# Patient Record
Sex: FEMALE | Race: WHITE | NOT HISPANIC OR LATINO | Employment: OTHER | URBAN - NONMETROPOLITAN AREA
[De-identification: names, ages, dates, MRNs, and addresses within clinical notes are randomized per-mention and may not be internally consistent; named-entity substitution may affect disease eponyms.]

---

## 2021-09-02 ENCOUNTER — TRANSCRIBE ORDERS (OUTPATIENT)
Dept: ADMINISTRATIVE | Facility: HOSPITAL | Age: 76
End: 2021-09-02

## 2021-09-02 DIAGNOSIS — F17.200 NICOTINE DEPENDENCE, UNCOMPLICATED, UNSPECIFIED NICOTINE PRODUCT TYPE: Primary | ICD-10-CM

## 2021-11-08 DIAGNOSIS — Z01.818 PREOPERATIVE CLEARANCE: ICD-10-CM

## 2021-11-08 DIAGNOSIS — Z12.11 ENCOUNTER FOR SCREENING FOR MALIGNANT NEOPLASM OF COLON: Primary | ICD-10-CM

## 2021-11-29 ENCOUNTER — OFFICE VISIT (OUTPATIENT)
Dept: GASTROENTEROLOGY | Facility: CLINIC | Age: 76
End: 2021-11-29

## 2021-11-29 VITALS — HEART RATE: 80 BPM | WEIGHT: 173 LBS | BODY MASS INDEX: 27.15 KG/M2 | OXYGEN SATURATION: 96 % | HEIGHT: 67 IN

## 2021-11-29 DIAGNOSIS — K59.03 DRUG-INDUCED CONSTIPATION: ICD-10-CM

## 2021-11-29 DIAGNOSIS — Z12.11 ENCOUNTER FOR SCREENING FOR MALIGNANT NEOPLASM OF COLON: Primary | ICD-10-CM

## 2021-11-29 PROCEDURE — 99204 OFFICE O/P NEW MOD 45 MIN: CPT | Performed by: PHYSICIAN ASSISTANT

## 2021-11-29 RX ORDER — GABAPENTIN 600 MG/1
600 TABLET ORAL 3 TIMES DAILY
COMMUNITY

## 2021-11-29 RX ORDER — MELOXICAM 15 MG/1
TABLET ORAL
Status: ON HOLD | COMMUNITY
End: 2023-01-01

## 2021-11-29 RX ORDER — SIMVASTATIN 80 MG
80 TABLET ORAL NIGHTLY
COMMUNITY
End: 2023-01-05 | Stop reason: HOSPADM

## 2021-11-29 RX ORDER — OFLOXACIN 3 MG/ML
SOLUTION/ DROPS OPHTHALMIC
Status: ON HOLD | COMMUNITY
End: 2022-12-31

## 2021-11-29 RX ORDER — LISINOPRIL 10 MG/1
TABLET ORAL
Status: ON HOLD | COMMUNITY
End: 2023-01-01

## 2021-11-29 RX ORDER — CYANOCOBALAMIN 1000 UG/ML
1000 INJECTION, SOLUTION INTRAMUSCULAR; SUBCUTANEOUS
COMMUNITY

## 2021-11-29 RX ORDER — OXYCODONE AND ACETAMINOPHEN 10; 325 MG/1; MG/1
1 TABLET ORAL 4 TIMES DAILY
COMMUNITY

## 2021-11-29 RX ORDER — MAGNESIUM CARB/ALUMINUM HYDROX 105-160MG
296 TABLET,CHEWABLE ORAL TAKE AS DIRECTED
Qty: 592 ML | Refills: 0 | Status: SHIPPED | OUTPATIENT
Start: 2021-11-29 | End: 2021-12-15 | Stop reason: HOSPADM

## 2021-11-29 RX ORDER — TRAZODONE HYDROCHLORIDE 100 MG/1
200 TABLET ORAL
COMMUNITY
Start: 2021-11-06 | End: 2023-01-05 | Stop reason: HOSPADM

## 2021-11-29 RX ORDER — BISACODYL 5 MG/1
20 TABLET, DELAYED RELEASE ORAL ONCE
Qty: 4 TABLET | Refills: 0 | Status: SHIPPED | OUTPATIENT
Start: 2021-11-29 | End: 2021-11-29

## 2021-11-29 RX ORDER — FERROUS SULFATE 325(65) MG
325 TABLET ORAL 2 TIMES DAILY
COMMUNITY

## 2021-11-29 RX ORDER — DICLOFENAC SODIUM 75 MG/1
TABLET, DELAYED RELEASE ORAL
Status: ON HOLD | COMMUNITY
End: 2023-01-01

## 2021-11-29 RX ORDER — METOPROLOL TARTRATE 50 MG/1
TABLET, FILM COATED ORAL 2 TIMES DAILY
Status: ON HOLD | COMMUNITY
End: 2023-01-01

## 2021-11-29 RX ORDER — DOXYCYCLINE HYCLATE 100 MG/1
CAPSULE ORAL
Status: ON HOLD | COMMUNITY
End: 2021-12-15

## 2021-11-29 RX ORDER — NYSTATIN 100000 U/G
CREAM TOPICAL
Status: ON HOLD | COMMUNITY
End: 2022-12-31

## 2021-11-29 RX ORDER — NALOXEGOL OXALATE 25 MG/1
TABLET, FILM COATED ORAL
Status: ON HOLD | COMMUNITY
End: 2023-01-01

## 2021-11-29 NOTE — PROGRESS NOTES
Chief Complaint   Patient presents with   • Constipation       Yamile Angel is a 76 y.o. female who presents to the office today for evaluation of Constipation  .    HPI  Patient presents the clinic today for constipation.  Patient states she has been dealing with this issue for some time now due to her oxycodone 10 mg that she takes every 8 hours.  Patient states that her pain management prescribes her Movantik 25 mg once daily however she does not always take it as prescribed due to it causing some diarrhea and loose bowel movements.  Patient has also taken laxatives and stool softeners in the past which seem to help somewhat.  Patient is also due her 5-year screening colonoscopy.  She last had hers done with Dr. Mcmahon in [place], [place].  She states on that last exam he did find something and informed her she would need them to be done every 5 years rather than 10.  She had had previous colonoscopies completed that were normal.  She is unsure what was found on last colonoscopy-does not know if it was precancerous polyp or not.  She has no rectal bleeding or melena.  Does have a family history of colon cancer-sister was diagnosed with colon cancer in her mid to late 50s.  Review of Systems   Constitutional: Negative.    HENT: Negative for sore throat and trouble swallowing.    Eyes: Negative.    Respiratory: Negative for chest tightness.    Cardiovascular: Negative for chest pain.   Gastrointestinal: Positive for constipation. Negative for abdominal distention, abdominal pain, anal bleeding, blood in stool, diarrhea, nausea, rectal pain and vomiting.   Endocrine: Negative.    Genitourinary: Negative for difficulty urinating.   Musculoskeletal: Positive for back pain. Negative for neck pain.   Skin: Negative.    Allergic/Immunologic: Negative for environmental allergies and food allergies.   Neurological: Positive for dizziness and light-headedness. Negative for headaches.   Hematological: Bruises/bleeds  easily.   Psychiatric/Behavioral: Positive for agitation and sleep disturbance. The patient is nervous/anxious.        ACTIVE PROBLEMS:   Specialty Problems     None          PAST MEDICAL HISTORY:  History reviewed. No pertinent past medical history.    SURGICAL HISTORY:  Past Surgical History:   Procedure Laterality Date   • COLONOSCOPY     • UPPER GASTROINTESTINAL ENDOSCOPY         FAMILY HISTORY:  History reviewed. No pertinent family history.    SOCIAL HISTORY:  Social History     Tobacco Use   • Smoking status: Current Every Day Smoker   • Smokeless tobacco: Never Used   Substance Use Topics   • Alcohol use: Not on file       CURRENT MEDICATION:    Current Outpatient Medications:   •  apixaban (Eliquis) 5 MG tablet tablet, Eliquis 5 mg tablet  take 1 tablet (5 mg) by oral route 2 times per day for 30 days, Disp: , Rfl:   •  cyanocobalamin 1000 MCG/ML injection, cyanocobalamin (vit B-12) 1,000 mcg/mL injection solution  INJECT 1 ML ONCE sub-q ONCE a MONTH, Disp: , Rfl:   •  diclofenac (VOLTAREN) 75 MG EC tablet, diclofenac sodium 75 mg tablet,delayed release  TAKE 1 TABLET BY MOUTH TWICE DAILY AS NEEDED, Disp: , Rfl:   •  doxycycline (VIBRAMYCIN) 100 MG capsule, doxycycline hyclate 100 mg capsule  TAKE ONE CAPSULE BY MOUTH EVERY DAY FOR 10 DAYS, Disp: , Rfl:   •  ferrous sulfate 325 (65 FE) MG tablet, FeroSul 325 mg (65 mg iron) tablet  take 1 tablet (325 mg) by oral route 2 times per day for 30 days, Disp: , Rfl:   •  gabapentin (NEURONTIN) 600 MG tablet, gabapentin 600 mg tablet  TAKE 1 TABLET BY MOUTH THREE TIMES DAILY AS DIRECTED, Disp: , Rfl:   •  lisinopril (PRINIVIL,ZESTRIL) 10 MG tablet, lisinopril 10 mg tablet  take 1 tablet (10 mg) by oral route once daily for 30 days, Disp: , Rfl:   •  meloxicam (MOBIC) 15 MG tablet, meloxicam 15 mg tablet  TAKE 1 TABLET BY MOUTH EVERY DAY, Disp: , Rfl:   •  metoprolol tartrate (LOPRESSOR) 50 MG tablet, metoprolol tartrate 50 mg tablet  take 1 tablet (50 mg) by oral  "route 2 times per day with meals for 30 days, Disp: , Rfl:   •  Naloxegol Oxalate (Movantik) 25 MG tablet, Movantik 25 mg tablet  TAKE 1 TABLET BY MOUTH EVERY DAY, Disp: , Rfl:   •  nystatin (MYCOSTATIN) 218279 UNIT/GM cream, nystatin 100,000 unit/gram topical cream  APPLY TO THE AFFECTED AREA(S) TWICE DAILY for 14 DAYS, Disp: , Rfl:   •  ofloxacin (OCUFLOX) 0.3 % ophthalmic solution, ofloxacin 0.3 % eye drops  USE 1 DROP IN OPERATIVE IN THE AFFECTED EYE FOUR TIMES DAILY for SEVEN DAYS FOR INFECTION, Disp: , Rfl:   •  oxyCODONE-acetaminophen (Endocet)  MG per tablet, Endocet 10 mg-325 mg tablet  TAKE 1 TABLET BY MOUTH THREE TIMES DAILY AS NEEDED, Disp: , Rfl:   •  simvastatin (ZOCOR) 80 MG tablet, simvastatin 80 mg tablet  take 1 tablet (80 mg) by oral route once daily in the evening, Disp: , Rfl:   •  tiotropium (Spiriva HandiHaler) 18 MCG per inhalation capsule, Spiriva with HandiHaler 18 mcg and inhalation capsules, Disp: , Rfl:   •  traZODone (DESYREL) 100 MG tablet, Take 200 mg by mouth every night at bedtime., Disp: , Rfl:   •  bisacodyl (DULCOLAX) 5 MG EC tablet, Take 4 tablets by mouth 1 (One) Time for 1 dose. Take 4 tablets at 4:00 PM the day before procedure, Disp: 4 tablet, Rfl: 0  •  magnesium citrate 1.745 GM/30ML solution solution, Take 296 mL by mouth Take As Directed for 2 doses. Take one full bottle at 4:00 PM and take second bottle at 10:00 PM., Disp: 592 mL, Rfl: 0    ALLERGIES:  Penicillins and Sulfa antibiotics    VISIT VITALS:  Pulse 80   Ht 170.2 cm (67\")   Wt 78.5 kg (173 lb)   SpO2 96%   BMI 27.10 kg/m²   Physical Exam  Constitutional:       General: She is not in acute distress.     Appearance: Normal appearance. She is well-developed.   HENT:      Head: Normocephalic and atraumatic.   Eyes:      Pupils: Pupils are equal, round, and reactive to light.   Cardiovascular:      Rate and Rhythm: Normal rate and regular rhythm.      Heart sounds: Normal heart sounds.   Pulmonary:      " Effort: Pulmonary effort is normal. No respiratory distress.      Breath sounds: Normal breath sounds. No wheezing, rhonchi or rales.   Abdominal:      General: Abdomen is flat. Bowel sounds are normal. There is no distension.      Palpations: Abdomen is soft. There is no mass.      Tenderness: There is no abdominal tenderness. There is no guarding or rebound.      Hernia: No hernia is present.   Musculoskeletal:         General: No swelling. Normal range of motion.      Cervical back: Normal range of motion and neck supple.      Right lower leg: No edema.      Left lower leg: No edema.   Skin:     General: Skin is warm and dry.   Neurological:      Mental Status: She is alert and oriented to person, place, and time.   Psychiatric:         Attention and Perception: Attention normal.         Mood and Affect: Mood normal.         Speech: Speech normal.         Behavior: Behavior normal. Behavior is cooperative.         Thought Content: Thought content normal.         Assessment/Plan   Due to the patient's symptoms of constipation-she was instructed to take her Movantik 25 mg once daily.  She was told if 25 mg is causing some diarrhea when she takes it that she can cut the tablet in half and decrease to 12.5 daily and see how she does.  I will also go ahead and get her scheduled to have a screening colonoscopy performed by Dr. Nichols using a mag citrate Dulcolax colon prep.  The procedure was thoroughly explained to her and she voiced understanding and agreed to the treatment plan.  We will try to obtain past colonoscopy records from Dr. Mcmahon in [place]   Diagnosis Plan   1. Encounter for screening for malignant neoplasm of colon  bisacodyl (DULCOLAX) 5 MG EC tablet    magnesium citrate 1.745 GM/30ML solution solution   2. Drug-induced constipation  bisacodyl (DULCOLAX) 5 MG EC tablet    magnesium citrate 1.745 GM/30ML solution solution       Return After procedure.                   This document has been  electronically signed by Celio Henao PA-C  November 29, 2021 12:42 EST    Part of this note may be an electronic transcription/translation of spoken language to printed text using the Dragon Dictation System.

## 2021-11-30 DIAGNOSIS — Z12.11 ENCOUNTER FOR SCREENING FOR MALIGNANT NEOPLASM OF COLON: ICD-10-CM

## 2021-11-30 DIAGNOSIS — Z01.818 PREOPERATIVE CLEARANCE: ICD-10-CM

## 2021-11-30 DIAGNOSIS — K59.03 DRUG-INDUCED CONSTIPATION: Primary | ICD-10-CM

## 2021-12-02 ENCOUNTER — PATIENT ROUNDING (BHMG ONLY) (OUTPATIENT)
Dept: GASTROENTEROLOGY | Facility: CLINIC | Age: 76
End: 2021-12-02

## 2021-12-06 PROBLEM — Z12.11 ENCOUNTER FOR SCREENING FOR MALIGNANT NEOPLASM OF COLON: Status: ACTIVE | Noted: 2021-12-06

## 2021-12-06 PROBLEM — Z01.818 PREOPERATIVE CLEARANCE: Status: ACTIVE | Noted: 2021-12-06

## 2021-12-13 ENCOUNTER — LAB (OUTPATIENT)
Dept: LAB | Facility: HOSPITAL | Age: 76
End: 2021-12-13

## 2021-12-13 DIAGNOSIS — Z01.818 PREOPERATIVE CLEARANCE: ICD-10-CM

## 2021-12-13 DIAGNOSIS — Z12.11 ENCOUNTER FOR SCREENING FOR MALIGNANT NEOPLASM OF COLON: ICD-10-CM

## 2021-12-13 DIAGNOSIS — K59.03 DRUG-INDUCED CONSTIPATION: ICD-10-CM

## 2021-12-14 ENCOUNTER — LAB (OUTPATIENT)
Dept: LAB | Facility: HOSPITAL | Age: 76
End: 2021-12-14

## 2021-12-14 DIAGNOSIS — Z01.818 PREOPERATIVE CLEARANCE: Primary | ICD-10-CM

## 2021-12-14 LAB
FLUAV RNA RESP QL NAA+PROBE: NOT DETECTED
FLUBV RNA RESP QL NAA+PROBE: NOT DETECTED
SARS-COV-2 RNA RESP QL NAA+PROBE: NOT DETECTED

## 2021-12-14 PROCEDURE — 87636 SARSCOV2 & INF A&B AMP PRB: CPT | Performed by: INTERNAL MEDICINE

## 2021-12-14 PROCEDURE — C9803 HOPD COVID-19 SPEC COLLECT: HCPCS

## 2021-12-15 ENCOUNTER — ANESTHESIA EVENT (OUTPATIENT)
Dept: PERIOP | Facility: HOSPITAL | Age: 76
End: 2021-12-15

## 2021-12-15 ENCOUNTER — ANESTHESIA (OUTPATIENT)
Dept: PERIOP | Facility: HOSPITAL | Age: 76
End: 2021-12-15

## 2021-12-15 ENCOUNTER — HOSPITAL ENCOUNTER (OUTPATIENT)
Facility: HOSPITAL | Age: 76
Setting detail: HOSPITAL OUTPATIENT SURGERY
Discharge: HOME | End: 2021-12-15
Attending: INTERNAL MEDICINE | Admitting: INTERNAL MEDICINE

## 2021-12-15 VITALS
HEIGHT: 67 IN | TEMPERATURE: 98.5 F | OXYGEN SATURATION: 98 % | RESPIRATION RATE: 20 BRPM | WEIGHT: 175 LBS | DIASTOLIC BLOOD PRESSURE: 72 MMHG | BODY MASS INDEX: 27.47 KG/M2 | SYSTOLIC BLOOD PRESSURE: 138 MMHG | HEART RATE: 90 BPM

## 2021-12-15 PROCEDURE — 25010000002 PROPOFOL 10 MG/ML EMULSION: Performed by: NURSE ANESTHETIST, CERTIFIED REGISTERED

## 2021-12-15 PROCEDURE — G0105 COLORECTAL SCRN; HI RISK IND: HCPCS | Performed by: INTERNAL MEDICINE

## 2021-12-15 RX ORDER — PROPOFOL 10 MG/ML
VIAL (ML) INTRAVENOUS AS NEEDED
Status: DISCONTINUED | OUTPATIENT
Start: 2021-12-15 | End: 2021-12-15 | Stop reason: SURG

## 2021-12-15 RX ORDER — ONDANSETRON 2 MG/ML
4 INJECTION INTRAMUSCULAR; INTRAVENOUS AS NEEDED
Status: DISCONTINUED | OUTPATIENT
Start: 2021-12-15 | End: 2021-12-15 | Stop reason: HOSPADM

## 2021-12-15 RX ORDER — MIDAZOLAM HYDROCHLORIDE 1 MG/ML
0.5 INJECTION INTRAMUSCULAR; INTRAVENOUS
Status: DISCONTINUED | OUTPATIENT
Start: 2021-12-15 | End: 2021-12-15 | Stop reason: HOSPADM

## 2021-12-15 RX ORDER — SODIUM CHLORIDE, SODIUM LACTATE, POTASSIUM CHLORIDE, CALCIUM CHLORIDE 600; 310; 30; 20 MG/100ML; MG/100ML; MG/100ML; MG/100ML
100 INJECTION, SOLUTION INTRAVENOUS ONCE AS NEEDED
Status: DISCONTINUED | OUTPATIENT
Start: 2021-12-15 | End: 2021-12-15 | Stop reason: HOSPADM

## 2021-12-15 RX ORDER — SODIUM CHLORIDE 0.9 % (FLUSH) 0.9 %
10 SYRINGE (ML) INJECTION EVERY 12 HOURS SCHEDULED
Status: DISCONTINUED | OUTPATIENT
Start: 2021-12-15 | End: 2021-12-15 | Stop reason: HOSPADM

## 2021-12-15 RX ORDER — DROPERIDOL 2.5 MG/ML
0.62 INJECTION, SOLUTION INTRAMUSCULAR; INTRAVENOUS ONCE AS NEEDED
Status: DISCONTINUED | OUTPATIENT
Start: 2021-12-15 | End: 2021-12-15 | Stop reason: HOSPADM

## 2021-12-15 RX ORDER — SODIUM CHLORIDE 0.9 % (FLUSH) 0.9 %
10 SYRINGE (ML) INJECTION AS NEEDED
Status: DISCONTINUED | OUTPATIENT
Start: 2021-12-15 | End: 2021-12-15 | Stop reason: HOSPADM

## 2021-12-15 RX ORDER — FENTANYL CITRATE 50 UG/ML
50 INJECTION, SOLUTION INTRAMUSCULAR; INTRAVENOUS
Status: DISCONTINUED | OUTPATIENT
Start: 2021-12-15 | End: 2021-12-15 | Stop reason: HOSPADM

## 2021-12-15 RX ORDER — IPRATROPIUM BROMIDE AND ALBUTEROL SULFATE 2.5; .5 MG/3ML; MG/3ML
3 SOLUTION RESPIRATORY (INHALATION) ONCE AS NEEDED
Status: DISCONTINUED | OUTPATIENT
Start: 2021-12-15 | End: 2021-12-15 | Stop reason: HOSPADM

## 2021-12-15 RX ORDER — SODIUM CHLORIDE, SODIUM LACTATE, POTASSIUM CHLORIDE, CALCIUM CHLORIDE 600; 310; 30; 20 MG/100ML; MG/100ML; MG/100ML; MG/100ML
125 INJECTION, SOLUTION INTRAVENOUS ONCE
Status: COMPLETED | OUTPATIENT
Start: 2021-12-15 | End: 2021-12-15

## 2021-12-15 RX ADMIN — PROPOFOL 50 MG: 10 INJECTION, EMULSION INTRAVENOUS at 09:55

## 2021-12-15 RX ADMIN — PROPOFOL 100 MG: 10 INJECTION, EMULSION INTRAVENOUS at 09:40

## 2021-12-15 RX ADMIN — PROPOFOL 50 MG: 10 INJECTION, EMULSION INTRAVENOUS at 09:50

## 2021-12-15 RX ADMIN — SODIUM CHLORIDE, POTASSIUM CHLORIDE, SODIUM LACTATE AND CALCIUM CHLORIDE: 600; 310; 30; 20 INJECTION, SOLUTION INTRAVENOUS at 09:34

## 2021-12-15 RX ADMIN — PROPOFOL 100 MG: 10 INJECTION, EMULSION INTRAVENOUS at 09:45

## 2021-12-15 RX ADMIN — EPHEDRINE SULFATE 10 MG: 50 INJECTION, SOLUTION INTRAVENOUS at 09:53

## 2021-12-15 RX ADMIN — EPHEDRINE SULFATE 10 MG: 50 INJECTION, SOLUTION INTRAVENOUS at 09:57

## 2021-12-15 NOTE — OP NOTE
COLONOSCOPY PROCEDURE NOTE    Yamile Angel  12/15/2021    GASTROENTEROLOGIST:  Rhonda Parada MD      PRE-PROCEDURE DIAGNOSIS:   Preoperative clearance [Z01.818]  Encounter for screening for malignant neoplasm of colon [Z12.11]    POST-PROCEDURE DIAGNOSIS:  1.  Poor to fair colon prep  2.  Internal hemorrhoids    PROCEDURE:  COLONOSCOPY      ANESTHESIA:  Propofol administered by anesthesia.  See anesthesia notes for ASA classification    STAFF:  Circulator: Grace Davenport RN; Venessa Beck RN  Endo Technician: Carmen Verde    Findings:  1.  Inadequate colon prep for evaluating flat or small polyps.  There were no large masses.  2.  Small internal hemorrhoids.    OPERATIVE PROCEDURE:  After proper informed consent was obtained, the patient was taken the operating suite and placed in left lateral decubitus position.  An Olympus video colonoscope 180 series was inserted in the rectum and advanced to the terminal ileum under direct visualization.  Cecum and terminal ileum were identified by visualization of the appendiceal orifice and ileocecal valve.  The colonoscope was then slowly withdrawn from the cecum to the rectum and passed a second time from rectum to cecum.  The colonoscope was retroflexed in the cecum and rectum. Scope was then withdrawn. Patient tolerated the procedure well. There were no immediate complications. Cecal withdrawal time was 10 minutes.    ESTIMATED BLOOD LOSS:  None     COMPLICATIONS:  None    GRAFTS/IMPLANTS:  None    RECOMMENDATIONS:  1.  Repeat colonoscopy in 1 year with a 2-day colon prep and family history colon cancer.    Rhonda Parada MD  12/15/21 10:01 EST

## 2021-12-15 NOTE — ANESTHESIA POSTPROCEDURE EVALUATION
Patient: Yamile Angel    Procedure Summary     Date: 12/15/21 Room / Location:  [facility]  /  [facility]    Anesthesia Start: 0934 Anesthesia Stop: 1000    Procedure: COLONOSCOPY FOR SCREENING (N/A ) Diagnosis:       Preoperative clearance      Encounter for screening for malignant neoplasm of colon      (Preoperative clearance [Z01.818])      (Encounter for screening for malignant neoplasm of colon [Z12.11])    Surgeons: Rhonda Parada MD Provider: Warren Arciniega DO    Anesthesia Type: general ASA Status: 3          Anesthesia Type: general    Vitals  Vitals Value Taken Time   /72 12/15/21 1031   Temp 98.5 °F (36.9 °C) 12/15/21 1001   Pulse 90 12/15/21 1031   Resp 20 12/15/21 1031   SpO2 98 % 12/15/21 1031           Post Anesthesia Care and Evaluation    Patient location during evaluation: PHASE II  Patient participation: complete - patient participated  Level of consciousness: awake and alert  Pain score: 0  Pain management: adequate  Airway patency: patent  Anesthetic complications: No anesthetic complications  PONV Status: controlled  Cardiovascular status: acceptable  Respiratory status: acceptable and room air  Hydration status: acceptable  No anesthesia care post op

## 2021-12-15 NOTE — DISCHARGE INSTRUCTIONS
Colonoscopy, Adult, Care After  This sheet gives you information about how to care for yourself after your procedure. Your doctor may also give you more specific instructions. If you have problems or questions, call your doctor.  What can I expect after the procedure?  After the procedure, it is common to have:  · A small amount of blood in your poop (stool) for 24 hours.  · Some gas.  · Mild cramping or bloating in your belly (abdomen).  Follow these instructions at home:  Eating and drinking    · Drink enough fluid to keep your pee (urine) pale yellow.  · Follow instructions from your doctor about what you cannot eat or drink.  · Return to your normal diet as told by your doctor. Avoid heavy or fried foods that are hard to digest.    Activity  · Rest as told by your doctor.  · Do not sit for a long time without moving. Get up to take short walks every 1-2 hours. This is important. Ask for help if you feel weak or unsteady.  · Return to your normal activities as told by your doctor. Ask your doctor what activities are safe for you.  To help cramping and bloating:    · Try walking around.  · Put heat on your belly as told by your doctor. Use the heat source that your doctor recommends, such as a moist heat pack or a heating pad.  ? Put a towel between your skin and the heat source.  ? Leave the heat on for 20-30 minutes.  ? Remove the heat if your skin turns bright red. This is very important if you are unable to feel pain, heat, or cold. You may have a greater risk of getting burned.    General instructions  · If you were given a medicine to help you relax (sedative) during your procedure, it can affect you for many hours. Do not drive or use machinery until your doctor says that it is safe.  · For the first 24 hours after the procedure:  ? Do not sign important documents.  ? Do not drink alcohol.  ? Do your daily activities more slowly than normal.  ? Eat foods that are soft and easy to digest.  · Take  over-the-counter or prescription medicines only as told by your doctor.  · Keep all follow-up visits as told by your doctor. This is important.  Contact a doctor if:  · You have blood in your poop 2-3 days after the procedure.  Get help right away if:  · You have more than a small amount of blood in your poop.  · You see large clumps of tissue (blood clots) in your poop.  · Your belly is swollen.  · You feel like you may vomit (nauseous).  · You vomit.  · You have a fever.  · You have belly pain that gets worse, and medicine does not help your pain.  Summary  · After the procedure, it is common to have a small amount of blood in your poop. You may also have mild cramping and bloating in your belly.  · If you were given a medicine to help you relax (sedative) during your procedure, it can affect you for many hours. Do not drive or use machinery until your doctor says that it is safe.  · Get help right away if you have a lot of blood in your poop, feel like you may vomit, have a fever, or have more belly pain.  This information is not intended to replace advice given to you by your health care provider. Make sure you discuss any questions you have with your health care provider.  Document Revised: 10/23/2020 Document Reviewed: 07/13/2020  [employer] Patient Education © 2021 [employer]  General Anesthesia, Adult, Care After  This sheet gives you information about how to care for yourself after your procedure. Your health care provider may also give you more specific instructions. If you have problems or questions, contact your health care provider.  What can I expect after the procedure?  After the procedure, the following side effects are common:  · Pain or discomfort at the IV site.  · Nausea.  · Vomiting.  · Sore throat.  · Trouble concentrating.  · Feeling cold or chills.  · Feeling weak or tired.  · Sleepiness and fatigue.  · Soreness and body aches. These side effects can affect parts of the body that were not  involved in surgery.  Follow these instructions at home:  For the time period you were told by your health care provider:    · Rest.  · Do not participate in activities where you could fall or become injured.  · Do not drive or use machinery.  · Do not drink alcohol.  · Do not take sleeping pills or medicines that cause drowsiness.  · Do not make important decisions or sign legal documents.  · Do not take care of children on your own.    Eating and drinking  · Follow any instructions from your health care provider about eating or drinking restrictions.  · When you feel hungry, start by eating small amounts of foods that are soft and easy to digest (bland), such as toast. Gradually return to your regular diet.  · Drink enough fluid to keep your urine pale yellow.  · If you vomit, rehydrate by drinking water, juice, or clear broth.  General instructions  · If you have sleep apnea, surgery and certain medicines can increase your risk for breathing problems. Follow instructions from your health care provider about wearing your sleep device:  ? Anytime you are sleeping, including during daytime naps.  ? While taking prescription pain medicines, sleeping medicines, or medicines that make you drowsy.  · Have a responsible adult stay with you for the time you are told. It is important to have someone help care for you until you are awake and alert.  · Return to your normal activities as told by your health care provider. Ask your health care provider what activities are safe for you.  · Take over-the-counter and prescription medicines only as told by your health care provider.  · If you smoke, do not smoke without supervision.  · Keep all follow-up visits as told by your health care provider. This is important.  Contact a health care provider if:  · You have nausea or vomiting that does not get better with medicine.  · You cannot eat or drink without vomiting.  · You have pain that does not get better with medicine.  · You  are unable to pass urine.  · You develop a skin rash.  · You have a fever.  · You have redness around your IV site that gets worse.  Get help right away if:  · You have difficulty breathing.  · You have chest pain.  · You have blood in your urine or stool, or you vomit blood.  Summary  · After the procedure, it is common to have a sore throat or nausea. It is also common to feel tired.  · Have a responsible adult stay with you for the time you are told. It is important to have someone help care for you until you are awake and alert.  · When you feel hungry, start by eating small amounts of foods that are soft and easy to digest (bland), such as toast. Gradually return to your regular diet.  · Drink enough fluid to keep your urine pale yellow.  · Return to your normal activities as told by your health care provider. Ask your health care provider what activities are safe for you.  This information is not intended to replace advice given to you by your health care provider. Make sure you discuss any questions you have with your health care provider.  Document Revised: 06/02/2021 Document Reviewed: 04/01/2021  [employer] Patient Education © 2021 [employer]

## 2021-12-15 NOTE — ANESTHESIA PREPROCEDURE EVALUATION
Anesthesia Evaluation     Patient summary reviewed and Nursing notes reviewed   no history of anesthetic complications:  NPO Solid Status: > 8 hours  NPO Liquid Status: > 8 hours           Airway   Mallampati: III  TM distance: >3 FB  Neck ROM: full  No difficulty expected  Dental    (+) upper dentures and poor dentition    Pulmonary - normal exam    breath sounds clear to auscultation  (+) a smoker Current, COPD moderate, shortness of breath,   Cardiovascular - normal exam    PT is on anticoagulation therapy  Patient on routine beta blocker and Beta blocker given within 24 hours of surgery  Rhythm: regular  Rate: normal    (+) hypertension, PARKER, hyperlipidemia,       Neuro/Psych- negative ROS  GI/Hepatic/Renal/Endo - negative ROS     Musculoskeletal     (+) back pain, chronic pain,   Abdominal   (+) obese,    Substance History - negative use     OB/GYN negative ob/gyn ROS         Other   arthritis,      ROS/Med Hx Other: D/c eliquis 3 days ago        Phys Exam Other: Poor dent lower jaw line.  Upper denture.               Anesthesia Plan    ASA 3     general   total IV anesthesia  intravenous induction     Anesthetic plan, all risks, benefits, and alternatives have been provided, discussed and informed consent has been obtained with: patient.    Plan discussed with CRNA.

## 2022-02-10 ENCOUNTER — TRANSCRIBE ORDERS (OUTPATIENT)
Dept: ADMINISTRATIVE | Facility: HOSPITAL | Age: 77
End: 2022-02-10

## 2022-02-10 DIAGNOSIS — I20.9 ANGINAL SYNDROME: Primary | ICD-10-CM

## 2022-08-25 ENCOUNTER — HOSPITAL ENCOUNTER (OUTPATIENT)
Dept: GENERAL RADIOLOGY | Facility: HOSPITAL | Age: 77
Discharge: HOME | End: 2022-08-25
Admitting: FAMILY MEDICINE

## 2022-08-25 ENCOUNTER — TRANSCRIBE ORDERS (OUTPATIENT)
Dept: ADMINISTRATIVE | Facility: HOSPITAL | Age: 77
End: 2022-08-25

## 2022-08-25 DIAGNOSIS — M79.671 PAIN IN RIGHT FOOT: ICD-10-CM

## 2022-08-25 DIAGNOSIS — M79.671 PAIN IN RIGHT FOOT: Primary | ICD-10-CM

## 2022-08-25 PROCEDURE — 73630 X-RAY EXAM OF FOOT: CPT

## 2022-08-25 PROCEDURE — 73630 X-RAY EXAM OF FOOT: CPT | Performed by: RADIOLOGY

## 2022-10-17 ENCOUNTER — TRANSCRIBE ORDERS (OUTPATIENT)
Dept: ADMINISTRATIVE | Facility: HOSPITAL | Age: 77
End: 2022-10-17

## 2022-10-17 DIAGNOSIS — F17.200 TOBACCO DEPENDENCE: Primary | ICD-10-CM

## 2022-10-17 DIAGNOSIS — F17.210 HEAVY SMOKER (MORE THAN 20 CIGARETTES PER DAY): ICD-10-CM

## 2022-11-23 ENCOUNTER — HOSPITAL ENCOUNTER (OUTPATIENT)
Dept: CT IMAGING | Facility: HOSPITAL | Age: 77
Discharge: HOME | End: 2022-11-23
Admitting: FAMILY MEDICINE

## 2022-11-23 DIAGNOSIS — F17.210 HEAVY SMOKER (MORE THAN 20 CIGARETTES PER DAY): ICD-10-CM

## 2022-11-23 DIAGNOSIS — F17.200 TOBACCO DEPENDENCE: ICD-10-CM

## 2022-11-23 PROCEDURE — 71271 CT THORAX LUNG CANCER SCR C-: CPT

## 2022-11-23 PROCEDURE — 71271 CT THORAX LUNG CANCER SCR C-: CPT | Performed by: RADIOLOGY

## 2022-12-31 ENCOUNTER — HOSPITAL ENCOUNTER (INPATIENT)
Facility: HOSPITAL | Age: 77
LOS: 5 days | Discharge: HOME | DRG: 388 | End: 2023-01-05
Attending: STUDENT IN AN ORGANIZED HEALTH CARE EDUCATION/TRAINING PROGRAM | Admitting: INTERNAL MEDICINE
Payer: MEDICARE

## 2022-12-31 ENCOUNTER — APPOINTMENT (OUTPATIENT)
Dept: CT IMAGING | Facility: HOSPITAL | Age: 77
DRG: 388 | End: 2022-12-31
Payer: MEDICARE

## 2022-12-31 ENCOUNTER — APPOINTMENT (OUTPATIENT)
Dept: ULTRASOUND IMAGING | Facility: HOSPITAL | Age: 77
DRG: 388 | End: 2022-12-31
Payer: MEDICARE

## 2022-12-31 ENCOUNTER — APPOINTMENT (OUTPATIENT)
Dept: GENERAL RADIOLOGY | Facility: HOSPITAL | Age: 77
DRG: 388 | End: 2022-12-31
Payer: MEDICARE

## 2022-12-31 DIAGNOSIS — N17.9 SEPSIS WITH ACUTE RENAL FAILURE WITHOUT SEPTIC SHOCK, DUE TO UNSPECIFIED ORGANISM, UNSPECIFIED ACUTE RENAL FAILURE TYPE: Primary | ICD-10-CM

## 2022-12-31 DIAGNOSIS — K56.7 ILEUS: ICD-10-CM

## 2022-12-31 DIAGNOSIS — J43.9 PULMONARY EMPHYSEMA, UNSPECIFIED EMPHYSEMA TYPE: ICD-10-CM

## 2022-12-31 DIAGNOSIS — A41.9 SEPSIS WITH ACUTE RENAL FAILURE WITHOUT SEPTIC SHOCK, DUE TO UNSPECIFIED ORGANISM, UNSPECIFIED ACUTE RENAL FAILURE TYPE: Primary | ICD-10-CM

## 2022-12-31 DIAGNOSIS — R65.20 SEPSIS WITH ACUTE RENAL FAILURE WITHOUT SEPTIC SHOCK, DUE TO UNSPECIFIED ORGANISM, UNSPECIFIED ACUTE RENAL FAILURE TYPE: Primary | ICD-10-CM

## 2022-12-31 LAB
A-A DO2: 43.7 MMHG (ref 0–300)
ALBUMIN SERPL-MCNC: 3.6 G/DL (ref 3.5–5.2)
ALBUMIN/GLOB SERPL: 0.8 G/DL
ALP SERPL-CCNC: 116 U/L (ref 39–117)
ALT SERPL W P-5'-P-CCNC: 19 U/L (ref 1–33)
AMYLASE SERPL-CCNC: 53 U/L (ref 28–100)
ANION GAP SERPL CALCULATED.3IONS-SCNC: 19.3 MMOL/L (ref 5–15)
ARTERIAL PATENCY WRIST A: POSITIVE
AST SERPL-CCNC: 38 U/L (ref 1–32)
ATMOSPHERIC PRESS: 725 MMHG
BACTERIA UR QL AUTO: ABNORMAL /HPF
BASE EXCESS BLDA CALC-SCNC: 1.1 MMOL/L (ref 0–2)
BASOPHILS # BLD AUTO: 0.05 10*3/MM3 (ref 0–0.2)
BASOPHILS NFR BLD AUTO: 0.3 % (ref 0–1.5)
BDY SITE: ABNORMAL
BILIRUB SERPL-MCNC: 0.5 MG/DL (ref 0–1.2)
BILIRUB UR QL STRIP: ABNORMAL
BODY TEMPERATURE: 0 C
BUN SERPL-MCNC: 53 MG/DL (ref 8–23)
BUN/CREAT SERPL: 29 (ref 7–25)
CALCIUM SPEC-SCNC: 9.1 MG/DL (ref 8.6–10.5)
CHLORIDE SERPL-SCNC: 89 MMOL/L (ref 98–107)
CHOLEST SERPL-MCNC: 109 MG/DL (ref 0–200)
CLARITY UR: CLEAR
CO2 BLDA-SCNC: 27.2 MMOL/L (ref 22–33)
CO2 SERPL-SCNC: 24.7 MMOL/L (ref 22–29)
COHGB MFR BLD: 1.7 % (ref 0–5)
COLOR UR: ABNORMAL
CREAT SERPL-MCNC: 1.83 MG/DL (ref 0.57–1)
CRP SERPL-MCNC: 18.68 MG/DL (ref 0–0.5)
D DIMER PPP FEU-MCNC: 1.37 MCGFEU/ML (ref 0–0.77)
D-LACTATE SERPL-SCNC: 1.3 MMOL/L (ref 0.5–2)
D-LACTATE SERPL-SCNC: 4.3 MMOL/L (ref 0.5–2)
DEPRECATED RDW RBC AUTO: 49.2 FL (ref 37–54)
EGFRCR SERPLBLD CKD-EPI 2021: 28.2 ML/MIN/1.73
EOSINOPHIL # BLD AUTO: 0.08 10*3/MM3 (ref 0–0.4)
EOSINOPHIL NFR BLD AUTO: 0.4 % (ref 0.3–6.2)
ERYTHROCYTE [DISTWIDTH] IN BLOOD BY AUTOMATED COUNT: 13.2 % (ref 12.3–15.4)
FLUAV RNA RESP QL NAA+PROBE: NOT DETECTED
FLUBV RNA ISLT QL NAA+PROBE: NOT DETECTED
GLOBULIN UR ELPH-MCNC: 4.3 GM/DL
GLUCOSE SERPL-MCNC: 109 MG/DL (ref 65–99)
GLUCOSE UR STRIP-MCNC: NEGATIVE MG/DL
HCO3 BLDA-SCNC: 26 MMOL/L (ref 20–26)
HCT VFR BLD AUTO: 42.6 % (ref 34–46.6)
HCT VFR BLD CALC: 37.7 % (ref 38–51)
HDLC SERPL-MCNC: 37 MG/DL (ref 40–60)
HGB BLD-MCNC: 13.4 G/DL (ref 12–15.9)
HGB BLDA-MCNC: 12.3 G/DL (ref 13.5–17.5)
HGB UR QL STRIP.AUTO: NEGATIVE
HYALINE CASTS UR QL AUTO: ABNORMAL /LPF
IMM GRANULOCYTES # BLD AUTO: 0.16 10*3/MM3 (ref 0–0.05)
IMM GRANULOCYTES NFR BLD AUTO: 0.9 % (ref 0–0.5)
INHALED O2 CONCENTRATION: 21 %
KETONES UR QL STRIP: ABNORMAL
LDLC SERPL CALC-MCNC: 49 MG/DL (ref 0–100)
LDLC/HDLC SERPL: 1.26 {RATIO}
LEUKOCYTE ESTERASE UR QL STRIP.AUTO: ABNORMAL
LIPASE SERPL-CCNC: 70 U/L (ref 13–60)
LYMPHOCYTES # BLD AUTO: 1.76 10*3/MM3 (ref 0.7–3.1)
LYMPHOCYTES NFR BLD AUTO: 9.4 % (ref 19.6–45.3)
Lab: ABNORMAL
Lab: ABNORMAL
MAGNESIUM SERPL-MCNC: 2.4 MG/DL (ref 1.6–2.4)
MCH RBC QN AUTO: 31.8 PG (ref 26.6–33)
MCHC RBC AUTO-ENTMCNC: 31.5 G/DL (ref 31.5–35.7)
MCV RBC AUTO: 100.9 FL (ref 79–97)
METHGB BLD QL: 0.2 % (ref 0–3)
MODALITY: ABNORMAL
MONOCYTES # BLD AUTO: 1.36 10*3/MM3 (ref 0.1–0.9)
MONOCYTES NFR BLD AUTO: 7.3 % (ref 5–12)
NEUTROPHILS NFR BLD AUTO: 15.33 10*3/MM3 (ref 1.7–7)
NEUTROPHILS NFR BLD AUTO: 81.7 % (ref 42.7–76)
NITRITE UR QL STRIP: NEGATIVE
NOTE: ABNORMAL
NOTIFIED BY: ABNORMAL
NOTIFIED WHO: ABNORMAL
NRBC BLD AUTO-RTO: 0.1 /100 WBC (ref 0–0.2)
NT-PROBNP SERPL-MCNC: 2108 PG/ML (ref 0–1800)
OXYHGB MFR BLDV: 83.9 % (ref 94–99)
PCO2 BLDA: 41.5 MM HG (ref 35–45)
PCO2 TEMP ADJ BLD: ABNORMAL MM[HG]
PH BLDA: 7.41 PH UNITS (ref 7.35–7.45)
PH UR STRIP.AUTO: 5.5 [PH] (ref 5–8)
PH, TEMP CORRECTED: ABNORMAL
PLATELET # BLD AUTO: 420 10*3/MM3 (ref 140–450)
PMV BLD AUTO: 9.7 FL (ref 6–12)
PO2 BLDA: 51.7 MM HG (ref 83–108)
PO2 TEMP ADJ BLD: ABNORMAL MM[HG]
POTASSIUM SERPL-SCNC: 4.2 MMOL/L (ref 3.5–5.2)
PROCALCITONIN SERPL-MCNC: 0.6 NG/ML (ref 0–0.25)
PROT SERPL-MCNC: 7.9 G/DL (ref 6–8.5)
PROT UR QL STRIP: ABNORMAL
RBC # BLD AUTO: 4.22 10*6/MM3 (ref 3.77–5.28)
RBC # UR STRIP: ABNORMAL /HPF
REF LAB TEST METHOD: ABNORMAL
SAO2 % BLDCOA: 85.5 % (ref 94–99)
SARS-COV-2 RNA PNL SPEC NAA+PROBE: NOT DETECTED
SODIUM SERPL-SCNC: 133 MMOL/L (ref 136–145)
SP GR UR STRIP: 1.02 (ref 1–1.03)
SQUAMOUS #/AREA URNS HPF: ABNORMAL /HPF
TRIGL SERPL-MCNC: 127 MG/DL (ref 0–150)
TROPONIN T SERPL-MCNC: <0.01 NG/ML (ref 0–0.03)
TROPONIN T SERPL-MCNC: <0.01 NG/ML (ref 0–0.03)
UROBILINOGEN UR QL STRIP: ABNORMAL
VENTILATOR MODE: ABNORMAL
VLDLC SERPL-MCNC: 23 MG/DL (ref 5–40)
WBC # UR STRIP: ABNORMAL /HPF
WBC NRBC COR # BLD: 18.74 10*3/MM3 (ref 3.4–10.8)

## 2022-12-31 PROCEDURE — 93005 ELECTROCARDIOGRAM TRACING: CPT | Performed by: PHYSICIAN ASSISTANT

## 2022-12-31 PROCEDURE — 25010000002 HEPARIN (PORCINE) PER 1000 UNITS: Performed by: INTERNAL MEDICINE

## 2022-12-31 PROCEDURE — 86140 C-REACTIVE PROTEIN: CPT | Performed by: PHYSICIAN ASSISTANT

## 2022-12-31 PROCEDURE — 85025 COMPLETE CBC W/AUTO DIFF WBC: CPT | Performed by: PHYSICIAN ASSISTANT

## 2022-12-31 PROCEDURE — 94761 N-INVAS EAR/PLS OXIMETRY MLT: CPT

## 2022-12-31 PROCEDURE — 25010000002 ONDANSETRON PER 1 MG: Performed by: PHYSICIAN ASSISTANT

## 2022-12-31 PROCEDURE — 81001 URINALYSIS AUTO W/SCOPE: CPT

## 2022-12-31 PROCEDURE — 82375 ASSAY CARBOXYHB QUANT: CPT

## 2022-12-31 PROCEDURE — 82150 ASSAY OF AMYLASE: CPT | Performed by: PHYSICIAN ASSISTANT

## 2022-12-31 PROCEDURE — 82805 BLOOD GASES W/O2 SATURATION: CPT

## 2022-12-31 PROCEDURE — 85379 FIBRIN DEGRADATION QUANT: CPT | Performed by: INTERNAL MEDICINE

## 2022-12-31 PROCEDURE — 84484 ASSAY OF TROPONIN QUANT: CPT | Performed by: PHYSICIAN ASSISTANT

## 2022-12-31 PROCEDURE — 94799 UNLISTED PULMONARY SVC/PX: CPT

## 2022-12-31 PROCEDURE — 80053 COMPREHEN METABOLIC PANEL: CPT | Performed by: PHYSICIAN ASSISTANT

## 2022-12-31 PROCEDURE — 99223 1ST HOSP IP/OBS HIGH 75: CPT | Performed by: INTERNAL MEDICINE

## 2022-12-31 PROCEDURE — 83880 ASSAY OF NATRIURETIC PEPTIDE: CPT | Performed by: PHYSICIAN ASSISTANT

## 2022-12-31 PROCEDURE — 83735 ASSAY OF MAGNESIUM: CPT | Performed by: PHYSICIAN ASSISTANT

## 2022-12-31 PROCEDURE — 76705 ECHO EXAM OF ABDOMEN: CPT

## 2022-12-31 PROCEDURE — 74176 CT ABD & PELVIS W/O CONTRAST: CPT

## 2022-12-31 PROCEDURE — 87636 SARSCOV2 & INF A&B AMP PRB: CPT | Performed by: PHYSICIAN ASSISTANT

## 2022-12-31 PROCEDURE — 83605 ASSAY OF LACTIC ACID: CPT | Performed by: PHYSICIAN ASSISTANT

## 2022-12-31 PROCEDURE — 83050 HGB METHEMOGLOBIN QUAN: CPT

## 2022-12-31 PROCEDURE — 25010000002 CEFEPIME PER 500 MG: Performed by: INTERNAL MEDICINE

## 2022-12-31 PROCEDURE — 71250 CT THORAX DX C-: CPT

## 2022-12-31 PROCEDURE — 84145 PROCALCITONIN (PCT): CPT | Performed by: INTERNAL MEDICINE

## 2022-12-31 PROCEDURE — 99285 EMERGENCY DEPT VISIT HI MDM: CPT

## 2022-12-31 PROCEDURE — 36415 COLL VENOUS BLD VENIPUNCTURE: CPT

## 2022-12-31 PROCEDURE — 25010000002 CEFTRIAXONE PER 250 MG: Performed by: PHYSICIAN ASSISTANT

## 2022-12-31 PROCEDURE — 36600 WITHDRAWAL OF ARTERIAL BLOOD: CPT

## 2022-12-31 PROCEDURE — 71045 X-RAY EXAM CHEST 1 VIEW: CPT

## 2022-12-31 PROCEDURE — 80061 LIPID PANEL: CPT

## 2022-12-31 PROCEDURE — 87040 BLOOD CULTURE FOR BACTERIA: CPT | Performed by: PHYSICIAN ASSISTANT

## 2022-12-31 PROCEDURE — 83690 ASSAY OF LIPASE: CPT | Performed by: PHYSICIAN ASSISTANT

## 2022-12-31 RX ORDER — METRONIDAZOLE 500 MG/100ML
500 INJECTION, SOLUTION INTRAVENOUS EVERY 8 HOURS
Status: DISCONTINUED | OUTPATIENT
Start: 2022-12-31 | End: 2023-01-05 | Stop reason: HOSPADM

## 2022-12-31 RX ORDER — SODIUM CHLORIDE 0.9 % (FLUSH) 0.9 %
10 SYRINGE (ML) INJECTION AS NEEDED
Status: DISCONTINUED | OUTPATIENT
Start: 2022-12-31 | End: 2023-01-05 | Stop reason: HOSPADM

## 2022-12-31 RX ORDER — SODIUM CHLORIDE 9 MG/ML
75 INJECTION, SOLUTION INTRAVENOUS CONTINUOUS
Status: DISCONTINUED | OUTPATIENT
Start: 2022-12-31 | End: 2023-01-02

## 2022-12-31 RX ORDER — SODIUM CHLORIDE 9 MG/ML
40 INJECTION, SOLUTION INTRAVENOUS AS NEEDED
Status: DISCONTINUED | OUTPATIENT
Start: 2022-12-31 | End: 2023-01-05 | Stop reason: HOSPADM

## 2022-12-31 RX ORDER — HEPARIN SODIUM 5000 [USP'U]/ML
5000 INJECTION, SOLUTION INTRAVENOUS; SUBCUTANEOUS EVERY 8 HOURS SCHEDULED
Status: DISCONTINUED | OUTPATIENT
Start: 2022-12-31 | End: 2023-01-05

## 2022-12-31 RX ORDER — NOREPINEPHRINE BIT/0.9 % NACL 8 MG/250ML
.02-.3 INFUSION BOTTLE (ML) INTRAVENOUS
Status: DISCONTINUED | OUTPATIENT
Start: 2022-12-31 | End: 2023-01-02

## 2022-12-31 RX ORDER — ACETAMINOPHEN 500 MG
500 TABLET ORAL EVERY 6 HOURS PRN
Status: DISCONTINUED | OUTPATIENT
Start: 2022-12-31 | End: 2023-01-05 | Stop reason: HOSPADM

## 2022-12-31 RX ORDER — POLYETHYLENE GLYCOL 3350 17 G/17G
17 POWDER, FOR SOLUTION ORAL DAILY
Status: DISCONTINUED | OUTPATIENT
Start: 2022-12-31 | End: 2023-01-05 | Stop reason: HOSPADM

## 2022-12-31 RX ORDER — SODIUM CHLORIDE 0.9 % (FLUSH) 0.9 %
10 SYRINGE (ML) INJECTION EVERY 12 HOURS SCHEDULED
Status: DISCONTINUED | OUTPATIENT
Start: 2022-12-31 | End: 2023-01-05 | Stop reason: HOSPADM

## 2022-12-31 RX ORDER — ONDANSETRON 2 MG/ML
4 INJECTION INTRAMUSCULAR; INTRAVENOUS ONCE
Status: COMPLETED | OUTPATIENT
Start: 2022-12-31 | End: 2022-12-31

## 2022-12-31 RX ADMIN — POLYETHYLENE GLYCOL (3350) 17 G: 17 POWDER, FOR SOLUTION ORAL at 22:27

## 2022-12-31 RX ADMIN — METRONIDAZOLE 500 MG: 500 INJECTION, SOLUTION INTRAVENOUS at 21:39

## 2022-12-31 RX ADMIN — ONDANSETRON 4 MG: 2 INJECTION INTRAMUSCULAR; INTRAVENOUS at 12:56

## 2022-12-31 RX ADMIN — CEFTRIAXONE 2 G: 2 INJECTION, POWDER, FOR SOLUTION INTRAMUSCULAR; INTRAVENOUS at 13:54

## 2022-12-31 RX ADMIN — SODIUM CHLORIDE 500 ML: 9 INJECTION, SOLUTION INTRAVENOUS at 19:58

## 2022-12-31 RX ADMIN — SODIUM CHLORIDE 500 ML: 9 INJECTION, SOLUTION INTRAVENOUS at 22:51

## 2022-12-31 RX ADMIN — SODIUM CHLORIDE 100 ML/HR: 9 INJECTION, SOLUTION INTRAVENOUS at 21:21

## 2022-12-31 RX ADMIN — HEPARIN SODIUM 5000 UNITS: 5000 INJECTION INTRAVENOUS; SUBCUTANEOUS at 22:15

## 2022-12-31 RX ADMIN — CEFEPIME 2 G: 2 INJECTION, POWDER, FOR SOLUTION INTRAVENOUS at 20:04

## 2022-12-31 RX ADMIN — Medication 10 ML: at 20:08

## 2022-12-31 RX ADMIN — SODIUM CHLORIDE 500 ML: 9 INJECTION, SOLUTION INTRAVENOUS at 14:10

## 2022-12-31 RX ADMIN — SODIUM CHLORIDE 500 ML: 9 INJECTION, SOLUTION INTRAVENOUS at 12:56

## 2022-12-31 NOTE — H&P
[facility] Medicine Services  History & Physical    Patient Identification:  Name:  Yamile Angel  Age:  77 y.o.  Sex:  female  :  1945  MRN:  [id number]   Visit Number:  [id number]  Admit Date: 2022   Primary Care Physician:  Cristofer Garcia MD    Subjective     Chief complaint: Nausea, vomiting, generalized abdominal pain    History of presenting illness:      Yamile Angel is a 77 y.o. female with past medical history significant for COPD on room air at baseline, hyperlipidemia, essential hypertension, and arthritis.  Patient presents to [facility] emergency department today for further evaluation of nausea, vomiting, and abdominal pain which has been ongoing for approximately 4 weeks.  Patient states that she has been able to tolerate clear liquids, however solid food usually results in vomiting.  She also reports generalized abdominal pain, worse in the mid epigastric region.  She reports history of hiatal hernia.  Denies any recent surgical procedures.  Reports constipation which has been ongoing for several weeks.  States that last bowel movement was 5 days ago.  States that she has had subjective, low-grade fever over the last couple of days.  She finally felt so weak and fatigued that she decided to present to the ED for further evaluation.  States that due to poor oral intake, she has experienced lightheadedness and dizziness with standing.  She reports weight loss, unsure of amount.  Denies any chest pain.  Does report some shortness of breath and intermittent productive cough with green sputum which has been ongoing for approximately 3 weeks.  She reports occasional wheezing.  Denies use of supplemental O2 at home.  Currently on room air and stable.  Lung sounds CTA bilaterally.  Breathing is even and unlabored.  Patient also reports decreased urine output and frequency.  Denies burning with urination.  She admits to smoking 1 pack of  cigarettes per day.  Denies need for nicotine patch at this time.  Denies alcohol abuse.  States that she currently lives alone and is compliant with home medication regimen.  Ambulates independently without assistive devices.  Denies any recent falls or injuries.  Plan to hold patient n.p.o.  General surgery consult placed for further eval.  Will initiate cefepime and Flagyl on admission.  Obtain echo in light of elevated proBNP.  No peripheral edema appreciated on exam.  Further work-up and supportive care.  Discussed plan with patient; voiced agreement with no further questions or concerns at this time.    Upon arrival to the ED, vital signs were temperature 98.2, pulse 118, respirations 20, blood pressure 80/51 lying, SPO2 saturation 93% on room air.  Troponin T negative.  proBNP 2108.  CMP with glucose 109, sodium 133, chloride 89, anion gap 19.3, creatinine 1.83, BUN 53, EGFR 28.2, AST 38, otherwise unremarkable.  Amylase 53.  C-reactive protein  18.68.  Lactate 4.3.  Lipase 70.  Magnesium 2.4.  CBC with WBCs 18.74, .9, otherwise unremarkable.  Peripheral blood cultures x2 pending.  COVID-19 and flu A/B swab negative.  Chest x-ray unremarkable.  CT of the chest without contrast with no acute findings.  CT abdomen pelvis without contrast revealed entire small bowel is mildly distended with fluid all the way to the terminal ileum suggesting an ileus; colon is not overly distended but there is fluid within the right colon and air in the transverse colon.  Gallbladder ultrasound revealed gallbladder sludge, otherwise normal gallbladder.    Known Emergency Department medications received prior to my evaluation included 2 g IV Rocephin, 4 mg IV Zofran, total of 1 L normal saline bolus. Emergency Department Room location at the time of my evaluation was 411. Discussed with attending physician, Nilton Murphy MD.       ---------------------------------------------------------------------------------------------------------------------   Review of Systems   Constitutional: Positive for activity change, appetite change, fatigue and fever (subjective, low-grade). Negative for chills and diaphoresis.   HENT: Negative for congestion, sinus pressure, sinus pain, sore throat and trouble swallowing.    Respiratory: Positive for cough (x3 weeks, productive) and shortness of breath (occasional). Negative for choking and chest tightness.    Cardiovascular: Negative for chest pain, palpitations and leg swelling.   Gastrointestinal: Positive for abdominal pain (generalized), constipation (last BM 5 days ago), nausea and vomiting. Negative for blood in stool and diarrhea.   Genitourinary: Positive for decreased urine volume and frequency. Negative for dysuria and flank pain.   Musculoskeletal: Positive for back pain (chronic). Negative for gait problem, neck pain and neck stiffness.   Neurological: Positive for dizziness, weakness (generalized) and light-headedness. Negative for tremors, seizures, syncope, facial asymmetry, speech difficulty, numbness and headaches.      ---------------------------------------------------------------------------------------------------------------------   Past Medical History:   Diagnosis Date   • Arthritis    • COPD (chronic obstructive pulmonary disease) (Formerly [facility])    • Elevated cholesterol    • Hyperlipidemia    • Hypertension    • Sepsis with acute renal failure without septic shock, due to unspecified organism, unspecified acute renal failure type ([facility]) 12/31/2022     Past Surgical History:   Procedure Laterality Date   • APPENDECTOMY     • COLONOSCOPY     • COLONOSCOPY N/A 12/15/2021    Procedure: COLONOSCOPY FOR SCREENING;  Surgeon: Rhonda Parada MD;  Location: [facility];  Service: Gastroenterology;  Laterality: N/A;   • EYE SURGERY     • HYSTERECTOMY     • SHOULDER SURGERY     • TUBAL  ABDOMINAL LIGATION     • UPPER GASTROINTESTINAL ENDOSCOPY     • WRIST SURGERY       No family history on file.  Social History     Socioeconomic History   • Marital status:    Tobacco Use   • Smoking status: Every Day     Packs/day: 1.00     Years: 60.00     Pack years: 60.00     Types: Cigarettes   • Smokeless tobacco: Never   Vaping Use   • Vaping Use: Former   Substance and Sexual Activity   • Alcohol use: Never   • Drug use: Never   • Sexual activity: Never     ---------------------------------------------------------------------------------------------------------------------   Allergies:  Penicillins and Sulfa antibiotics  ---------------------------------------------------------------------------------------------------------------------   Home medications:    Medications below are reported home medications pulling from within the system; at this time, these medications have not been reconciled unless otherwise specified and are in the verification process for further verifcation as current home medications.  (Not in a hospital admission)      Hospital Scheduled Meds:          Current listed hospital scheduled medications may not yet reflect those currently placed in orders that are signed and held awaiting patient's arrival to floor.   ---------------------------------------------------------------------------------------------------------------------     Objective     Vital Signs:  Temp:  [98.2 °F (36.8 °C)] 98.2 °F (36.8 °C)  Heart Rate:  [108-118] 110  Resp:  [20] 20  BP: ()/(48-72) 112/72      12/31/22  1125   Weight: 68 kg (150 lb)     Body mass index is 23.49 kg/m².  ---------------------------------------------------------------------------------------------------------------------       Physical Exam  Vitals reviewed.   Constitutional:       General: She is awake. She is not in acute distress.     Appearance: She is ill-appearing. She is not diaphoretic.      Comments: Currently on room  air.   HENT:      Head: Normocephalic and atraumatic.      Mouth/Throat:      Mouth: Mucous membranes are moist.      Pharynx: Oropharynx is clear.   Eyes:      Extraocular Movements: Extraocular movements intact.      Pupils: Pupils are equal, round, and reactive to light.   Cardiovascular:      Rate and Rhythm: Regular rhythm. Tachycardia present.      Pulses:           Dorsalis pedis pulses are 3+ on the right side and 3+ on the left side.      Heart sounds: Normal heart sounds. No murmur heard.    No friction rub.   Pulmonary:      Effort: Pulmonary effort is normal. No accessory muscle usage, respiratory distress or retractions.      Breath sounds: No wheezing, rhonchi or rales.      Comments: No cough observed on exam.   Abdominal:      General: There is no distension.      Palpations: Abdomen is soft.      Tenderness: There is abdominal tenderness (generalized). There is no guarding or rebound.   Musculoskeletal:         General: No swelling.      Cervical back: Neck supple. No rigidity.      Right lower leg: No edema.      Left lower leg: No edema.   Skin:     General: Skin is warm and dry.      Capillary Refill: Capillary refill takes 2 to 3 seconds.      Coloration: Skin is pale.   Neurological:      Mental Status: She is alert and oriented to person, place, and time. Mental status is at baseline.      Sensory: Sensation is intact. No sensory deficit.      Motor: Weakness (generalized) present. No tremor.   Psychiatric:         Attention and Perception: Attention normal.         Mood and Affect: Mood normal.         Speech: Speech normal.         Behavior: Behavior normal. Behavior is cooperative.         Thought Content: Thought content normal.         Cognition and Memory: Cognition normal.       ---------------------------------------------------------------------------------------------------------------------  EKG:  Pending cardiology interpretation. Per my review, appears sinus tachycardia with some  PACs. QTc mildly prolonged at 467 ms. No evidence of acute ischemia.     Telemetry:  Appearing sinus tachycardia 100s on my exam.   I have personally looked at both the EKG and the telemetry strips.  ---------------------------------------------------------------------------------------------------------------------   Results from last 7 days   Lab Units 12/31/22  1314 12/31/22  1241   CRP mg/dL 18.68*  --    LACTATE mmol/L  --  4.3*   WBC 10*3/mm3  --  18.74*   HEMOGLOBIN g/dL  --  13.4   HEMATOCRIT %  --  42.6   MCV fL  --  100.9*   MCHC g/dL  --  31.5   PLATELETS 10*3/mm3  --  420         Results from last 7 days   Lab Units 12/31/22  1314 12/31/22  1242   SODIUM mmol/L 133*  --    POTASSIUM mmol/L 4.2  --    MAGNESIUM mg/dL  --  2.4   CHLORIDE mmol/L 89*  --    CO2 mmol/L 24.7  --    BUN mg/dL 53*  --    CREATININE mg/dL 1.83*  --    CALCIUM mg/dL 9.1  --    GLUCOSE mg/dL 109*  --    ALBUMIN g/dL 3.6  --    BILIRUBIN mg/dL 0.5  --    ALK PHOS U/L 116  --    AST (SGOT) U/L 38*  --    ALT (SGPT) U/L 19  --    Estimated Creatinine Clearance: 27.6 mL/min (A) (by C-G formula based on SCr of 1.83 mg/dL (H)).  No results found for: AMMONIA  Results from last 7 days   Lab Units 12/31/22  1314   TROPONIN T ng/mL <0.010     Results from last 7 days   Lab Units 12/31/22  1314   PROBNP pg/mL 2,108.0*     No results found for: HGBA1C  No results found for: TSH, FREET4  No results found for: PREGTESTUR, PREGSERUM, HCG, HCGQUANT  Pain Management Panel    There is no flowsheet data to display.           ---------------------------------------------------------------------------------------------------------------------  Imaging Results (Last 7 Days)     Procedure Component Value Units Date/Time    CT Abdomen Pelvis Without Contrast [[id number]] Collected: 12/31/22 1423     Updated: 12/31/22 1425    Narrative:      CT Abdomen Pelvis WO    INDICATION:   Nausea and vomiting for 4 weeks with elevated white blood cell  count    TECHNIQUE:   CT of the abdomen and pelvis without IV contrast. Coronal and sagittal reconstructions were obtained.  Radiation dose reduction techniques included automated exposure control or exposure modulation based on body size. Count of known CT and cardiac nuc  med studies performed in previous 12 months: 1.     COMPARISON:   None available.    FINDINGS:  Abdomen: Liver, gallbladder, spleen, pancreas, adrenal glands and kidneys are normal in appearance. Aorta is normal in size. There is no adenopathy. There is distention of all the small bowel and there is fluid mildly distending the right colon. Left  colon and sigmoid colon are mostly collapsed. Transverse colon is filled with air.    Pelvis: Bladder is normal. Uterus is been removed. There are no adnexal masses. Bones are unremarkable.      Impression:      The entire small bowel is mildly distended with fluid all way to the terminal ileum suggesting an ileus.    Colon is not overly distended but there is fluid within the right colon and air in the transverse colon.    Otherwise negative          Signer Name: Anthony London MD   Signed: 12/31/2022 2:23 PM   Workstation Name: [facility]    [facility]    CT Chest Without Contrast Diagnostic [[id number]] Collected: 12/31/22 1420     Updated: 12/31/22 1423    Narrative:      CT Chest WO    INDICATION:   Chest pain today    TECHNIQUE:   CT of the thorax without IV contrast. Coronal and sagittal reconstructions were obtained.  Radiation dose reduction techniques included automated exposure control or exposure modulation based on body size. Count of known CT and cardiac nuc med studies  performed in previous 12 months: 1.     COMPARISON:   11/23/2022    FINDINGS:  Is mild stable apical fibrotic change in the lungs are otherwise clear. Aorta is normal in size. There is no adenopathy. Right thyroid lobe is normal. Left thyroid lobe is small. Please see abdomen pelvic CT scan for findings  below the diaphragm    There are degenerative changes of the thoracic spine      Impression:      No acute findings    Signer Name: Anthony London MD   Signed: 12/31/2022 2:20 PM   Workstation Name: [facility]    [facility]iology [facility]    US Gallbladder [[id number]] Collected: 12/31/22 1355     Updated: 12/31/22 1357    Narrative:      US Abdomen Ltd    INDICATION:   Right upper quadrant abdominal pain    COMPARISON:   None available.    FINDINGS:  Gallbladder is adequately distended. Wall is not thickened. No stones are visible. There is sludge present Visualized portions of liver normal. Portal vein shows flow into the liver. Common bile duct is 4 mm in diameter.      Impression:      Gallbladder sludge is noted otherwise normal gallbladder ultrasound    Signer Name: Anthony London MD   Signed: 12/31/2022 1:55 PM   Workstation Name: [facility]    Radiology [facility]    XR Chest 1 View [[id number]] Collected: 12/31/22 1228     Updated: 12/31/22 1230    Narrative:      CR Chest 1 Vw    INDICATION:   Nausea and vomiting today     COMPARISON:    None available.    FINDINGS:  Portable AP view(s) of the chest.  The heart and mediastinal contours are normal. The lungs are clear. No pneumothorax or pleural effusion.      Impression:      No acute cardiopulmonary findings.    Signer Name: Anthony London MD   Signed: 12/31/2022 12:28 PM   Workstation Name: [facility]    Radiology [facility]          Last echocardiogram: No previous echo on file.    I have personally reviewed the above radiology images and read the final radiology report on 12/31/22  ---------------------------------------------------------------------------------------------------------------------  Assessment / Plan     Active Hospital Problems    Diagnosis  POA   • **Sepsis with acute renal failure without septic shock, due to unspecified organism, unspecified acute renal failure type (HCC) [A41.9, R65.20, N17.9]  Yes        ASSESSMENT/PLAN:  -Acute small bowel ileus, present on admission  -Septic shock criteria met on admission with HR>90, WBCs>12, Lactate>4, and CRP elevation likely 2/2 above  -Elevated anion gap metabolic acidosis, POA  -Mildly elevated AST, POA  -CT of the abdomen/pelvis obtained in ED. Revealed distention of the entire small bowel with fluid all the way to the terminal ileum, suggesting an ileus.  Also noted fluid within the right colon and air in the transverse colon.  -Gallbladder ultrasound with noted gallbladder sludge, otherwise unremarkable.  -Initially hypotensive; improved after fluid resuscitation.  -Received total of 1 L normal saline bolus and ABX in the ED.  -500 mL NS bolus signed & held to be administered upon arrival to PCU.   -Initiate IV Flagyl and cefepime on admission.  -Peripheral blood cultures obtained x2, pending.  -Amylase non elevated, lipase 70.  -Hold n.p.o.  -General surgery consult placed for further evaluation; greatly appreciate their assistance.  -Repeat CBC in the a.m.  -Closely monitor temperature curve.  -Obtain vital signs per hospital policy.  -Continuous cardiac monitoring and pulse oximetry.   -Tylenol as needed for mild pain/fever.  -Encourage incentive spirometer use.    -Acute kidney injury, POA  -Mild hyponatremia, POA  -Mild hypochloremia, POA  -Baseline creatinine unknown with creatinine on admission of 1.83.  -Monitor urine output and renal function closely.  -Sodium 133.  -Received 1 L normal saline bolus in the ED.  -Avoid nephrotoxins as able.  -Repeat chemistry panel in AM.    -Elevated proBNP (2108), POA  -No known Hx of congestive heart failure  -Chest x-ray and CT of the chest without evidence of effusions or cardiomegaly.  -Transthoracic Echo ordered for further evaluation.  -Appearing euvolemic on exam.    -Essential hypertension  -Initially hypotensive in ED; BP improved after fluid resuscitation.   -Review home medications once reconciled by pharmacy;  plan to hold antihypertensives for now until BP remains stable.   -Closely monitor BP per hospital protocol, titrate medications as necessary.    -Hyperlipidemia  -Obtain fasting lipid panel in the a.m.  -Resume home statin once medications reconciled.    -COPD without acute exacerbation  -On room air at baseline.  -Currently on room air/stable.   -Continuous pulse oximetry.  -Titrate supplemental oxygen to maintain SpO2 saturations > 90%.   -Encourage controlled cough/deep breathing exercises.  -DuoNebs 4 times daily for SOA/wheezing.     -Macrocytosis without anemia, POA  -Hgb/Hct 13.4/42.6 on admission.  -MCV elevated at 100.9.  -Obtain vitamin B12 and folate levels.  -Repeat CBC in the a.m.    -Reported chronic back pain  -Arthritis  -Supportive care.  -Up with assistance, fall precautions.  -Review home medications once reconciled.    -Ongoing tobacco abuse   -Strongly encourage cessation.  -Denies need for NRT at this time.      ----------  -DVT prophylaxis: Subcu heparin  -Activity: Bedrest  -Expected length of stay:   INPATIENT status due to the need for care which can only be reasonably provided in an hospital setting such as aggressive/expedited ancillary services and/or consultation services, the necessity for IV medications, close physician monitoring and/or the possible need for procedures.  In such, I feel patient's risk for adverse outcomes and need for care warrant INPATIENT evaluation and predict the patient's care encounter to likely last beyond 2 midnights.  -Disposition plans to return home on discharge once medically stable.     High risk secondary to acute small bowel ileus, meeting septic shock criteria by CMS guidelines on admission.          Shahnaz Reyes, TOMAS   12/31/22  15:52 EST  Pager #[phone]  ---------------------------------------------------------------------------------------------------------------------

## 2022-12-31 NOTE — ED PROVIDER NOTES
Subjective   History of Present Illness  This is a 77 year old female patient who presents to the ER with chief complaint of nausea and vomiting. PMH significant for HTN, HLD, A fibb on eliquis. For the past 4 weeks, the patient has had daily nausea and vomiting. She also has generalized abdominal pain. Denies diarrhea, constipation, urinary symptoms, fever. She denies chest pain, SOB. She has been followed by her PCP for this issue and nothing has seemed to help so far. She feels very weak.         Review of Systems   Constitutional: Negative for fever.   HENT: Negative.    Respiratory: Negative.    Cardiovascular: Negative.  Negative for chest pain.   Gastrointestinal: Positive for abdominal pain, nausea and vomiting. Negative for abdominal distention, anal bleeding, blood in stool, constipation, diarrhea and rectal pain.   Endocrine: Negative.    Genitourinary: Negative.  Negative for dysuria.   Skin: Negative.    Neurological: Positive for weakness. Negative for dizziness, tremors, seizures, syncope, facial asymmetry, speech difficulty, light-headedness, numbness and headaches.   Psychiatric/Behavioral: Negative.    All other systems reviewed and are negative.      Past Medical History:   Diagnosis Date   • Arthritis    • COPD (chronic obstructive pulmonary disease) (HCC)    • Elevated cholesterol    • Hyperlipidemia    • Hypertension        Allergies   Allergen Reactions   • Penicillins Rash   • Sulfa Antibiotics Rash       Past Surgical History:   Procedure Laterality Date   • APPENDECTOMY     • COLONOSCOPY     • COLONOSCOPY N/A 12/15/2021    Procedure: COLONOSCOPY FOR SCREENING;  Surgeon: Rhonda Parada MD;  Location: [facility];  Service: Gastroenterology;  Laterality: N/A;   • EYE SURGERY     • HYSTERECTOMY     • SHOULDER SURGERY     • TUBAL ABDOMINAL LIGATION     • UPPER GASTROINTESTINAL ENDOSCOPY     • WRIST SURGERY         No family history on file.    Social History     Socioeconomic History    • Marital status:    Tobacco Use   • Smoking status: Every Day     Packs/day: 1.00     Years: 60.00     Pack years: 60.00     Types: Cigarettes   • Smokeless tobacco: Never   Vaping Use   • Vaping Use: Former   Substance and Sexual Activity   • Alcohol use: Never   • Drug use: Never   • Sexual activity: Never           Objective   Physical Exam  Vitals and nursing note reviewed.   Constitutional:       General: She is not in acute distress.     Appearance: She is well-developed. She is not diaphoretic.   HENT:      Head: Normocephalic and atraumatic.      Right Ear: External ear normal.      Left Ear: External ear normal.      Nose: Nose normal.   Eyes:      Conjunctiva/sclera: Conjunctivae normal.      Pupils: Pupils are equal, round, and reactive to light.   Neck:      Vascular: No JVD.      Trachea: No tracheal deviation.   Cardiovascular:      Rate and Rhythm: Normal rate and regular rhythm.      Heart sounds: Normal heart sounds. No murmur heard.  Pulmonary:      Effort: Pulmonary effort is normal. No respiratory distress.      Breath sounds: Normal breath sounds. No wheezing.   Abdominal:      General: Bowel sounds are normal.      Palpations: Abdomen is soft.      Tenderness: There is no abdominal tenderness. There is no right CVA tenderness, left CVA tenderness, guarding or rebound.   Musculoskeletal:         General: No deformity. Normal range of motion.      Cervical back: Normal range of motion and neck supple.   Skin:     General: Skin is warm and dry.      Coloration: Skin is not pale.      Findings: No erythema or rash.   Neurological:      Mental Status: She is alert and oriented to person, place, and time.      Cranial Nerves: No cranial nerve deficit.   Psychiatric:         Behavior: Behavior normal.         Thought Content: Thought content normal.         Procedures           ED Course  ED Course as of 12/31/22 1517   Sat Dec 31, 2022   1222 EKG noted sinus tachycardia.  Occasional  premature atrial complex.  115 beats minute.  No acute ST elevation    Electronically signed by Arnel Barrientos DO, 12/31/22, 12:22 PM EST.   [SF]   1306 XR Chest 1 View  IMPRESSION:  No acute cardiopulmonary findings.     Signer Name: Anthony London MD   Signed: 12/31/2022 12:28 PM   Workstation Name: [email or web address]    [facility] [MM]   1413 US Gallbladder  IMPRESSION:  Gallbladder sludge is noted otherwise normal gallbladder ultrasound     Signer Name: Anthony London MD   Signed: 12/31/2022 1:55 PM   Workstation Name: [email or web address]    [facility] [MM]   1432 CT Chest Without Contrast Diagnostic  IMPRESSION:  No acute findings     Signer Name: Anthony London MD   Signed: 12/31/2022 2:20 PM   Workstation Name: [facility]    [facility] [MM]   1432 CT Abdomen Pelvis Without Contrast  IMPRESSION:  The entire small bowel is mildly distended with fluid all way to the terminal ileum suggesting an ileus.     Colon is not overly distended but there is fluid within the right colon and air in the transverse colon.     Otherwise negative              Signer Name: Anthony London MD   Signed: 12/31/2022 2:23 PM   Workstation Name: [facility]    [facility] [MM]   1503 Spoke with Dr. Langston about this patient who says that this patient isn't surgical but she is happy to consult if hospitalist will admit.  [MM]   1515 Spoke with Dr. Fong who has accepted her in admission.  [MM]      ED Course User Index  [MM] Chitra Norris PA  [SF] Arnel Barrientos DO                                           Medical Decision Making  Ileus (HCC): acute illness or injury  Sepsis with acute renal failure without septic shock, due to unspecified organism, unspecified acute renal failure type (HCC): acute illness or injury  Amount and/or Complexity of Data Reviewed  Labs: ordered.  Radiology: ordered. Decision-making details documented in ED Course.  ECG/medicine  tests: ordered.      Risk  Prescription drug management.  Decision regarding hospitalization.          Final diagnoses:   Sepsis with acute renal failure without septic shock, due to unspecified organism, unspecified acute renal failure type (HCC)   Ileus (HCC)       ED Disposition  ED Disposition     ED Disposition   Decision to Admit    Condition   --    Comment   --             No follow-up provider specified.       Medication List      No changes were made to your prescriptions during this visit.          Chitra Norris PA  12/31/22 1512

## 2023-01-01 ENCOUNTER — APPOINTMENT (OUTPATIENT)
Dept: GENERAL RADIOLOGY | Facility: HOSPITAL | Age: 78
DRG: 388 | End: 2023-01-01
Payer: MEDICARE

## 2023-01-01 ENCOUNTER — APPOINTMENT (OUTPATIENT)
Dept: CARDIOLOGY | Facility: HOSPITAL | Age: 78
DRG: 388 | End: 2023-01-01
Payer: MEDICARE

## 2023-01-01 ENCOUNTER — APPOINTMENT (OUTPATIENT)
Dept: NUCLEAR MEDICINE | Facility: HOSPITAL | Age: 78
DRG: 388 | End: 2023-01-01
Payer: MEDICARE

## 2023-01-01 LAB
ALBUMIN SERPL-MCNC: 2.8 G/DL (ref 3.5–5.2)
ALBUMIN/GLOB SERPL: 0.8 G/DL
ALP SERPL-CCNC: 90 U/L (ref 39–117)
ALT SERPL W P-5'-P-CCNC: 16 U/L (ref 1–33)
ANION GAP SERPL CALCULATED.3IONS-SCNC: 14.4 MMOL/L (ref 5–15)
AST SERPL-CCNC: 33 U/L (ref 1–32)
BASOPHILS # BLD AUTO: 0.03 10*3/MM3 (ref 0–0.2)
BASOPHILS NFR BLD AUTO: 0.2 % (ref 0–1.5)
BH CV ECHO LEFT VENTRICLE BASAL CAVITARY GRADIENT: 70 MMHG
BH CV ECHO MEAS - AO MAX PG: 11 MMHG
BH CV ECHO MEAS - AO MEAN PG: 8 MMHG
BH CV ECHO MEAS - AO ROOT DIAM: 3.3 CM
BH CV ECHO MEAS - AO V2 MAX: 166 CM/SEC
BH CV ECHO MEAS - AO V2 VTI: 36.8 CM
BH CV ECHO MEAS - EDV(CUBED): 64 ML
BH CV ECHO MEAS - EDV(MOD-SP4): 45.7 ML
BH CV ECHO MEAS - EF(MOD-SP4): 51.6 %
BH CV ECHO MEAS - ESV(CUBED): 39.3 ML
BH CV ECHO MEAS - ESV(MOD-SP4): 22.1 ML
BH CV ECHO MEAS - FS: 15 %
BH CV ECHO MEAS - IVS/LVPW: 1.4 CM
BH CV ECHO MEAS - IVSD: 1.4 CM
BH CV ECHO MEAS - LA DIMENSION: 2.5 CM
BH CV ECHO MEAS - LAT PEAK E' VEL: 6.2 CM/SEC
BH CV ECHO MEAS - LV DIASTOLIC VOL/BSA (35-75): 25.5 CM2
BH CV ECHO MEAS - LV MASS(C)D: 165.5 GRAMS
BH CV ECHO MEAS - LV SYSTOLIC VOL/BSA (12-30): 12.3 CM2
BH CV ECHO MEAS - LVIDD: 4 CM
BH CV ECHO MEAS - LVIDS: 3.4 CM
BH CV ECHO MEAS - LVOT AREA: 3.1 CM2
BH CV ECHO MEAS - LVOT DIAM: 2 CM
BH CV ECHO MEAS - LVPWD: 1 CM
BH CV ECHO MEAS - MED PEAK E' VEL: 5.6 CM/SEC
BH CV ECHO MEAS - MV A MAX VEL: 135 CM/SEC
BH CV ECHO MEAS - MV E MAX VEL: 61.3 CM/SEC
BH CV ECHO MEAS - MV E/A: 0.45
BH CV ECHO MEAS - SI(MOD-SP4): 13.2 ML/M2
BH CV ECHO MEAS - SV(MOD-SP4): 23.6 ML
BH CV ECHO MEAS - TAPSE (>1.6): 1.89 CM
BH CV ECHO MEASUREMENTS AVERAGE E/E' RATIO: 10.39
BILIRUB SERPL-MCNC: 0.3 MG/DL (ref 0–1.2)
BUN SERPL-MCNC: 51 MG/DL (ref 8–23)
BUN/CREAT SERPL: 42.1 (ref 7–25)
CALCIUM SPEC-SCNC: 8.1 MG/DL (ref 8.6–10.5)
CHLORIDE SERPL-SCNC: 98 MMOL/L (ref 98–107)
CO2 SERPL-SCNC: 23.6 MMOL/L (ref 22–29)
CREAT SERPL-MCNC: 1.21 MG/DL (ref 0.57–1)
D-LACTATE SERPL-SCNC: 0.8 MMOL/L (ref 0.5–2)
DEPRECATED RDW RBC AUTO: 50.1 FL (ref 37–54)
DEPRECATED RDW RBC AUTO: 50.5 FL (ref 37–54)
EGFRCR SERPLBLD CKD-EPI 2021: 46.3 ML/MIN/1.73
EOSINOPHIL # BLD AUTO: 0.02 10*3/MM3 (ref 0–0.4)
EOSINOPHIL NFR BLD AUTO: 0.1 % (ref 0.3–6.2)
ERYTHROCYTE [DISTWIDTH] IN BLOOD BY AUTOMATED COUNT: 13.2 % (ref 12.3–15.4)
ERYTHROCYTE [DISTWIDTH] IN BLOOD BY AUTOMATED COUNT: 13.2 % (ref 12.3–15.4)
FOLATE SERPL-MCNC: 6.09 NG/ML (ref 4.78–24.2)
GLOBULIN UR ELPH-MCNC: 3.5 GM/DL
GLUCOSE SERPL-MCNC: 110 MG/DL (ref 65–99)
HCT VFR BLD AUTO: 31.6 % (ref 34–46.6)
HCT VFR BLD AUTO: 32.5 % (ref 34–46.6)
HGB BLD-MCNC: 10.1 G/DL (ref 12–15.9)
HGB BLD-MCNC: 10.3 G/DL (ref 12–15.9)
IMM GRANULOCYTES # BLD AUTO: 0.13 10*3/MM3 (ref 0–0.05)
IMM GRANULOCYTES NFR BLD AUTO: 0.9 % (ref 0–0.5)
LEFT ATRIUM VOLUME INDEX: 33.5 ML/M2
LYMPHOCYTES # BLD AUTO: 2.45 10*3/MM3 (ref 0.7–3.1)
LYMPHOCYTES NFR BLD AUTO: 16.8 % (ref 19.6–45.3)
MAXIMAL PREDICTED HEART RATE: 143 BPM
MCH RBC QN AUTO: 32.6 PG (ref 26.6–33)
MCH RBC QN AUTO: 32.9 PG (ref 26.6–33)
MCHC RBC AUTO-ENTMCNC: 31.7 G/DL (ref 31.5–35.7)
MCHC RBC AUTO-ENTMCNC: 32 G/DL (ref 31.5–35.7)
MCV RBC AUTO: 102.8 FL (ref 79–97)
MCV RBC AUTO: 102.9 FL (ref 79–97)
MONOCYTES # BLD AUTO: 1.32 10*3/MM3 (ref 0.1–0.9)
MONOCYTES NFR BLD AUTO: 9.1 % (ref 5–12)
NEUTROPHILS NFR BLD AUTO: 10.61 10*3/MM3 (ref 1.7–7)
NEUTROPHILS NFR BLD AUTO: 72.9 % (ref 42.7–76)
NRBC BLD AUTO-RTO: 0 /100 WBC (ref 0–0.2)
PLATELET # BLD AUTO: 269 10*3/MM3 (ref 140–450)
PLATELET # BLD AUTO: 285 10*3/MM3 (ref 140–450)
PMV BLD AUTO: 9.5 FL (ref 6–12)
PMV BLD AUTO: 9.8 FL (ref 6–12)
POTASSIUM SERPL-SCNC: 3.9 MMOL/L (ref 3.5–5.2)
PROT SERPL-MCNC: 6.3 G/DL (ref 6–8.5)
QT INTERVAL: 338 MS
QTC INTERVAL: 467 MS
RBC # BLD AUTO: 3.07 10*6/MM3 (ref 3.77–5.28)
RBC # BLD AUTO: 3.16 10*6/MM3 (ref 3.77–5.28)
SODIUM SERPL-SCNC: 136 MMOL/L (ref 136–145)
STRESS TARGET HR: 122 BPM
TSH SERPL DL<=0.05 MIU/L-ACNC: 0.29 UIU/ML (ref 0.27–4.2)
VIT B12 BLD-MCNC: 1071 PG/ML (ref 211–946)
WBC NRBC COR # BLD: 14.24 10*3/MM3 (ref 3.4–10.8)
WBC NRBC COR # BLD: 14.56 10*3/MM3 (ref 3.4–10.8)

## 2023-01-01 PROCEDURE — 84443 ASSAY THYROID STIM HORMONE: CPT | Performed by: INTERNAL MEDICINE

## 2023-01-01 PROCEDURE — 82746 ASSAY OF FOLIC ACID SERUM: CPT | Performed by: INTERNAL MEDICINE

## 2023-01-01 PROCEDURE — 85027 COMPLETE CBC AUTOMATED: CPT | Performed by: INTERNAL MEDICINE

## 2023-01-01 PROCEDURE — 94640 AIRWAY INHALATION TREATMENT: CPT

## 2023-01-01 PROCEDURE — 71045 X-RAY EXAM CHEST 1 VIEW: CPT

## 2023-01-01 PROCEDURE — 02HV33Z INSERTION OF INFUSION DEVICE INTO SUPERIOR VENA CAVA, PERCUTANEOUS APPROACH: ICD-10-PCS | Performed by: INTERNAL MEDICINE

## 2023-01-01 PROCEDURE — A9567 TECHNETIUM TC-99M AEROSOL: HCPCS | Performed by: INTERNAL MEDICINE

## 2023-01-01 PROCEDURE — 94799 UNLISTED PULMONARY SVC/PX: CPT

## 2023-01-01 PROCEDURE — 80053 COMPREHEN METABOLIC PANEL: CPT | Performed by: INTERNAL MEDICINE

## 2023-01-01 PROCEDURE — 93306 TTE W/DOPPLER COMPLETE: CPT

## 2023-01-01 PROCEDURE — 99221 1ST HOSP IP/OBS SF/LOW 40: CPT | Performed by: SURGERY

## 2023-01-01 PROCEDURE — A9540 TC99M MAA: HCPCS | Performed by: INTERNAL MEDICINE

## 2023-01-01 PROCEDURE — 25010000002 HEPARIN (PORCINE) PER 1000 UNITS: Performed by: INTERNAL MEDICINE

## 2023-01-01 PROCEDURE — 78582 LUNG VENTILAT&PERFUS IMAGING: CPT

## 2023-01-01 PROCEDURE — 94761 N-INVAS EAR/PLS OXIMETRY MLT: CPT

## 2023-01-01 PROCEDURE — 25010000002 CEFEPIME PER 500 MG: Performed by: INTERNAL MEDICINE

## 2023-01-01 PROCEDURE — 83605 ASSAY OF LACTIC ACID: CPT | Performed by: INTERNAL MEDICINE

## 2023-01-01 PROCEDURE — 0 TECHNETIUM ALBUMIN AGGREGATED: Performed by: INTERNAL MEDICINE

## 2023-01-01 PROCEDURE — 82607 VITAMIN B-12: CPT | Performed by: INTERNAL MEDICINE

## 2023-01-01 PROCEDURE — 99233 SBSQ HOSP IP/OBS HIGH 50: CPT | Performed by: INTERNAL MEDICINE

## 2023-01-01 PROCEDURE — 85025 COMPLETE CBC W/AUTO DIFF WBC: CPT | Performed by: INTERNAL MEDICINE

## 2023-01-01 PROCEDURE — 0 TECHNETIUM TC 99M PENTETATE INHALER: Performed by: INTERNAL MEDICINE

## 2023-01-01 PROCEDURE — 36556 INSERT NON-TUNNEL CV CATH: CPT | Performed by: SURGERY

## 2023-01-01 RX ORDER — TRAZODONE HYDROCHLORIDE 50 MG/1
25 TABLET ORAL NIGHTLY
Status: DISCONTINUED | OUTPATIENT
Start: 2023-01-02 | End: 2023-01-05 | Stop reason: HOSPADM

## 2023-01-01 RX ORDER — METOPROLOL TARTRATE 50 MG/1
50 TABLET, FILM COATED ORAL 2 TIMES DAILY
COMMUNITY
End: 2023-01-05 | Stop reason: HOSPADM

## 2023-01-01 RX ORDER — ONDANSETRON HYDROCHLORIDE 8 MG/1
8 TABLET, FILM COATED ORAL 2 TIMES DAILY PRN
COMMUNITY

## 2023-01-01 RX ORDER — GABAPENTIN 300 MG/1
300 CAPSULE ORAL EVERY 8 HOURS SCHEDULED
Status: DISCONTINUED | OUTPATIENT
Start: 2023-01-01 | End: 2023-01-05 | Stop reason: HOSPADM

## 2023-01-01 RX ORDER — METOPROLOL TARTRATE 50 MG/1
50 TABLET, FILM COATED ORAL 2 TIMES DAILY
Status: CANCELLED | OUTPATIENT
Start: 2023-01-01

## 2023-01-01 RX ORDER — ONDANSETRON 4 MG/1
8 TABLET, FILM COATED ORAL 2 TIMES DAILY PRN
Status: DISCONTINUED | OUTPATIENT
Start: 2023-01-01 | End: 2023-01-05 | Stop reason: HOSPADM

## 2023-01-01 RX ORDER — SODIUM CHLORIDE 0.9 % (FLUSH) 0.9 %
10 SYRINGE (ML) INJECTION EVERY 12 HOURS SCHEDULED
Status: DISCONTINUED | OUTPATIENT
Start: 2023-01-01 | End: 2023-01-05 | Stop reason: HOSPADM

## 2023-01-01 RX ORDER — TRAZODONE HYDROCHLORIDE 50 MG/1
200 TABLET ORAL NIGHTLY
Status: CANCELLED | OUTPATIENT
Start: 2023-01-01

## 2023-01-01 RX ORDER — LISINOPRIL AND HYDROCHLOROTHIAZIDE 25; 20 MG/1; MG/1
1 TABLET ORAL DAILY
COMMUNITY
End: 2023-01-05 | Stop reason: HOSPADM

## 2023-01-01 RX ORDER — OXYCODONE AND ACETAMINOPHEN 10; 325 MG/1; MG/1
1 TABLET ORAL 4 TIMES DAILY
Status: DISCONTINUED | OUTPATIENT
Start: 2023-01-01 | End: 2023-01-05 | Stop reason: HOSPADM

## 2023-01-01 RX ORDER — ATORVASTATIN CALCIUM 40 MG/1
40 TABLET, FILM COATED ORAL NIGHTLY
Status: DISCONTINUED | OUTPATIENT
Start: 2023-01-01 | End: 2023-01-05 | Stop reason: HOSPADM

## 2023-01-01 RX ORDER — HYDROCHLOROTHIAZIDE 25 MG/1
25 TABLET ORAL
Status: CANCELLED | OUTPATIENT
Start: 2023-01-01

## 2023-01-01 RX ORDER — SODIUM CHLORIDE 0.9 % (FLUSH) 0.9 %
10 SYRINGE (ML) INJECTION AS NEEDED
Status: DISCONTINUED | OUTPATIENT
Start: 2023-01-01 | End: 2023-01-05 | Stop reason: HOSPADM

## 2023-01-01 RX ORDER — SODIUM CHLORIDE 0.9 % (FLUSH) 0.9 %
20 SYRINGE (ML) INJECTION AS NEEDED
Status: DISCONTINUED | OUTPATIENT
Start: 2023-01-01 | End: 2023-01-05 | Stop reason: HOSPADM

## 2023-01-01 RX ORDER — LISINOPRIL 10 MG/1
20 TABLET ORAL
Status: CANCELLED | OUTPATIENT
Start: 2023-01-01

## 2023-01-01 RX ORDER — FERROUS SULFATE 325(65) MG
325 TABLET ORAL 2 TIMES DAILY
Status: DISCONTINUED | OUTPATIENT
Start: 2023-01-01 | End: 2023-01-05 | Stop reason: HOSPADM

## 2023-01-01 RX ADMIN — KIT FOR THE PREPARATION OF TECHNETIUM TC 99M ALBUMIN AGGREGATED 1 DOSE: 2.5 INJECTION, POWDER, FOR SOLUTION INTRAVENOUS at 15:05

## 2023-01-01 RX ADMIN — GABAPENTIN 300 MG: 300 CAPSULE ORAL at 16:11

## 2023-01-01 RX ADMIN — Medication 10 ML: at 20:55

## 2023-01-01 RX ADMIN — CEFEPIME 2 G: 2 INJECTION, POWDER, FOR SOLUTION INTRAVENOUS at 08:25

## 2023-01-01 RX ADMIN — IPRATROPIUM BROMIDE 0.5 MG: 0.5 SOLUTION RESPIRATORY (INHALATION) at 13:15

## 2023-01-01 RX ADMIN — HEPARIN SODIUM 5000 UNITS: 5000 INJECTION INTRAVENOUS; SUBCUTANEOUS at 20:49

## 2023-01-01 RX ADMIN — METRONIDAZOLE 500 MG: 500 INJECTION, SOLUTION INTRAVENOUS at 20:48

## 2023-01-01 RX ADMIN — ATORVASTATIN CALCIUM 40 MG: 40 TABLET, FILM COATED ORAL at 20:48

## 2023-01-01 RX ADMIN — METRONIDAZOLE 500 MG: 500 INJECTION, SOLUTION INTRAVENOUS at 15:34

## 2023-01-01 RX ADMIN — IPRATROPIUM BROMIDE 0.5 MG: 0.5 SOLUTION RESPIRATORY (INHALATION) at 18:41

## 2023-01-01 RX ADMIN — SODIUM CHLORIDE 100 ML/HR: 9 INJECTION, SOLUTION INTRAVENOUS at 03:32

## 2023-01-01 RX ADMIN — GABAPENTIN 300 MG: 300 CAPSULE ORAL at 20:49

## 2023-01-01 RX ADMIN — HEPARIN SODIUM 5000 UNITS: 5000 INJECTION INTRAVENOUS; SUBCUTANEOUS at 05:22

## 2023-01-01 RX ADMIN — OXYCODONE HYDROCHLORIDE AND ACETAMINOPHEN 1 TABLET: 10; 325 TABLET ORAL at 16:11

## 2023-01-01 RX ADMIN — KIT FOR THE PREPARATION OF TECHNETIUM TC 99M PENTETATE 1 DOSE: 20 INJECTION, POWDER, LYOPHILIZED, FOR SOLUTION INTRAVENOUS; RESPIRATORY (INHALATION) at 14:51

## 2023-01-01 RX ADMIN — FERROUS SULFATE TAB 325 MG (65 MG ELEMENTAL FE) 325 MG: 325 (65 FE) TAB at 20:49

## 2023-01-01 RX ADMIN — HEPARIN SODIUM 5000 UNITS: 5000 INJECTION INTRAVENOUS; SUBCUTANEOUS at 15:34

## 2023-01-01 RX ADMIN — IPRATROPIUM BROMIDE 0.5 MG: 0.5 SOLUTION RESPIRATORY (INHALATION) at 00:40

## 2023-01-01 RX ADMIN — IPRATROPIUM BROMIDE 0.5 MG: 0.5 SOLUTION RESPIRATORY (INHALATION) at 06:22

## 2023-01-01 RX ADMIN — Medication 10 ML: at 08:26

## 2023-01-01 RX ADMIN — NOREPINEPHRINE BITARTRATE 0.02 MCG/KG/MIN: 1 INJECTION, SOLUTION, CONCENTRATE INTRAVENOUS at 00:42

## 2023-01-01 RX ADMIN — METRONIDAZOLE 500 MG: 500 INJECTION, SOLUTION INTRAVENOUS at 05:22

## 2023-01-01 RX ADMIN — OXYCODONE HYDROCHLORIDE AND ACETAMINOPHEN 1 TABLET: 10; 325 TABLET ORAL at 20:49

## 2023-01-01 NOTE — OP NOTE
Central line insertion subclavian    Surgeon:  Yamile Langston M.D., LOKESH Angel      Pre op Diagnosis: hypotension requiring pressors    Post op Diagnosis:  same    Anesthesia: 1% lidocaine    Procedure:  After obtaining informed consent patient was placed in the Trendelenburg position.  With use of the Seldinger technique the left subclavian vein was cannulated.  Guidewire was passed without resistance.  The tract was dilated and the triple-lumen catheter was passed to 20 cm and sutured in place. Good blood return was obtained from all 3 ports.  Dressing was placed.    CXR result: pending    Blood administered:  none      Yamile Langston MD     Date: 1/1/2023  Time: 14:19 EST

## 2023-01-01 NOTE — PROGRESS NOTES
[facility] HOSPITALISTS CROSS COVER NOTE    Patient Identification:  Name:  Yamile Angel  Age:  77 y.o.  Sex:  female  :  1945  MRN:  [id number]  Visit number:  [id number]  Primary Care Provider:  Cristofer Garcia MD    Length of stay in inpatient status:  0    Brief Update     Called about low blood pressures; the patient was about to receive the 500 mL bolus that Dr. Fong; I started the patient on NS at 100 mL/hour.  However, the blood pressures improved some but then dropped again. I gave another 500 mL bolus; I examined the patient prior to this 4th bolus and did not hear any crackles.  She was alert and oriented otherwise.  Thus, when the blood pressures did not improve consistently, I ordered Levophed; at the lowest dose, the blood pressures improved.  I checked on the patient again and she still is doing well with no crackles and no edema.  Will continue to monitor the patient closely.    Kamryn Evans MD  [facility]  22  22:35 EST

## 2023-01-01 NOTE — PLAN OF CARE
Goal Outcome Evaluation:  Plan of Care Reviewed With: patient        Progress: no change  Outcome Evaluation: Pt A/Ox4. VSS, pt remains on levo gtt. NS infusing at 100. RA, tolerating well. No complaints of chest pain or shortness of breath. Pt has had complaints of back pain, MD notified. Call light in reach, bed locked and lowest level, bed alarm active. Will continue to monitor.

## 2023-01-01 NOTE — PROGRESS NOTES
[facility] HOSPITALIST PROGRESS NOTE     Patient Identification:  Name:  Yamile Angel  Age:  77 y.o.  Sex:  female  :  1945  MRN:  [id number]  Visit Number:  [id number]  ROOM: [address]     Primary Care Provider:  Cristofer Garcia MD    Length of stay in inpatient status:  1    Subjective     Chief Compliant:    Chief Complaint   Patient presents with   • Nausea   • Vomiting       History of Presenting Illness:    Patient reports still feeling unwell. Has been NPO but requesting to drink her pepsi. She continues to have generalized abdominal aches. 1 stool recorded. Family supportive bedside.     ROS:  Otherwise 10 point ROS negative other than documented above in HPI.     Objective     Current Hospital Meds:cefepime, 2 g, Intravenous, Q24H  heparin (porcine), 5,000 Units, Subcutaneous, Q8H  ipratropium, 0.5 mg, Nebulization, 4x Daily - RT  metroNIDAZOLE, 500 mg, Intravenous, Q8H  polyethylene glycol, 17 g, Oral, Daily  sodium chloride, 10 mL, Intravenous, Q12H    norepinephrine, 0.02-0.3 mcg/kg/min, Last Rate: 0.04 mcg/kg/min (23 0700)  sodium chloride, 100 mL/hr, Last Rate: 100 mL/hr (23 0332)        Current Antimicrobial Therapy:  Anti-Infectives (From admission, onward)    Ordered     Dose/Rate Route Frequency Start Stop    228  cefepime 2 gm IVPB in 100 ml NS (VTB)        Ordering Provider: Nilton Fong MD    2 g  over 4 Hours Intravenous Every 24 Hours 23 0800 23 0759    22 1838  metroNIDAZOLE (FLAGYL) IVPB 500 mg        Ordering Provider: Nilton Fong MD    500 mg Intravenous Every 8 Hours 22 2200 23 2159    22 1838  cefepime 2 gm IVPB in 100 ml NS (VTB)        Ordering Provider: Nilton Fong MD    2 g  over 30 Minutes Intravenous Once 22 2034    22 1328  cefTRIAXone (ROCEPHIN) 2 g in sodium chloride 0.9 % 100 mL IVPB-VTB        Ordering Provider: Chitra Norris PA    2 g  200 mL/hr  over 30 Minutes Intravenous Once 12/31/22 1330 12/31/22 1424        Current Diuretic Therapy:  Diuretics (From admission, onward)    None        ----------------------------------------------------------------------------------------------------------------------  Vital Signs:  Temp:  [98.2 °F (36.8 °C)-98.8 °F (37.1 °C)] 98.2 °F (36.8 °C)  Heart Rate:  [] 93  Resp:  [17-26] 17  BP: ()/(42-80) 114/62  SpO2:  [91 %-100 %] 91 %  on  Flow (L/min):  [2] 2;   Device (Oxygen Therapy): room air  Body mass index is 23.79 kg/m².    Wt Readings from Last 3 Encounters:   01/01/23 68.9 kg (151 lb 14.4 oz)   12/15/21 79.4 kg (175 lb)   11/29/21 78.5 kg (173 lb)     Intake & Output (last 3 days)       12/29 0701 12/30 0700 12/30 0701 12/31 0700 12/31 0701 01/01 0700 01/01 0701 01/02 0700    I.V. (mL/kg)   1747.5 (25.4)     IV Piggyback   2400     Total Intake(mL/kg)   4147.5 (60.2)     Urine (mL/kg/hr)   800 350 (0.8)    Stool   0 0    Total Output   800 350    Net   +3347.5 -350            Stool Unmeasured Occurrence   1 x 1 x        NPO Diet NPO Type: Strict NPO  ----------------------------------------------------------------------------------------------------------------------  Physical exam:  Constitutional:  Well-developed and well-nourished.  No respiratory distress.      HENT:  Head:  Normocephalic and atraumatic.  Mouth:  Moist mucous membranes.    Eyes:  Conjunctivae and EOM are normal. No scleral icterus.    Neck:  Neck supple.  No JVD present.    Cardiovascular:  Normal rate, regular rhythm and normal heart sounds with no murmur.  Pulmonary/Chest:  No respiratory distress, no wheezes, no crackles, with normal breath sounds and good air movement.  Abdominal:  Soft.  Bowel sounds are normal.  No distension and no tenderness.   Musculoskeletal:  No edema, no tenderness, and no deformity.  No red or swollen joints anywhere.    Neurological:  Alert and oriented to person, place, and time.  No cranial nerve  deficit.  No tongue deviation.  No facial droop.  No slurred speech.   Skin:  Skin is warm and dry. No rash noted. No pallor.   Peripheral vascular:  Pulses in all 4 extremities with no clubbing, no cyanosis, no edema.  ----------------------------------------------------------------------------------------------------------------------  Tele:    ----------------------------------------------------------------------------------------------------------------------  Results from last 7 days   Lab Units 01/01/23  0659 01/01/23  0049 12/31/22  1542 12/31/22  1314 12/31/22  1241   CRP mg/dL  --   --   --  18.68*  --    LACTATE mmol/L 0.8  --  1.3  --  4.3*   WBC 10*3/mm3 14.24* 14.56*  --   --  18.74*   HEMOGLOBIN g/dL 10.1* 10.3*  --   --  13.4   HEMATOCRIT % 31.6* 32.5*  --   --  42.6   MCV fL 102.9* 102.8*  --   --  100.9*   MCHC g/dL 32.0 31.7  --   --  31.5   PLATELETS 10*3/mm3 285 269  --   --  420     Results from last 7 days   Lab Units 12/31/22  1654   PH, ARTERIAL pH units 7.405   PO2 ART mm Hg 51.7*   PCO2, ARTERIAL mm Hg 41.5   HCO3 ART mmol/L 26.0     Results from last 7 days   Lab Units 01/01/23  0049 12/31/22  1314 12/31/22  1242   SODIUM mmol/L 136 133*  --    POTASSIUM mmol/L 3.9 4.2  --    MAGNESIUM mg/dL  --   --  2.4   CHLORIDE mmol/L 98 89*  --    CO2 mmol/L 23.6 24.7  --    BUN mg/dL 51* 53*  --    CREATININE mg/dL 1.21* 1.83*  --    CALCIUM mg/dL 8.1* 9.1  --    GLUCOSE mg/dL 110* 109*  --    ALBUMIN g/dL 2.8* 3.6  --    BILIRUBIN mg/dL 0.3 0.5  --    ALK PHOS U/L 90 116  --    AST (SGOT) U/L 33* 38*  --    ALT (SGPT) U/L 16 19  --    Estimated Creatinine Clearance: 42.4 mL/min (A) (by C-G formula based on SCr of 1.21 mg/dL (H)).  No results found for: AMMONIA  Results from last 7 days   Lab Units 12/31/22  1542 12/31/22  1314   TROPONIN T ng/mL <0.010 <0.010     Results from last 7 days   Lab Units 12/31/22  1314   PROBNP pg/mL 2,108.0*     Results from last 7 days   Lab Units 12/31/22  1542    CHOLESTEROL mg/dL 109   TRIGLYCERIDES mg/dL 127   HDL CHOL mg/dL 37*   LDL CHOL mg/dL 49     No results found for: HGBA1C, POCGLU  Lab Results   Component Value Date    TSH 0.286 01/01/2023     No results found for: PREGTESTUR, PREGSERUM, HCG, HCGQUANT  Pain Management Panel    There is no flowsheet data to display.       Brief Urine Lab Results  (Last result in the past 365 days)      Color   Clarity   Blood   Leuk Est   Nitrite   Protein   CREAT   Urine HCG        12/31/22 2250 Dark Yellow   Clear   Negative   Small (1+)   Negative   Trace               Blood Culture   Date Value Ref Range Status   12/31/2022 No growth at 24 hours  Preliminary   12/31/2022 No growth at 24 hours  Preliminary     No results found for: URINECX  No results found for: WOUNDCX  No results found for: STOOLCX  No results found for: RESPCX  No results found for: AFBCX  Results from last 7 days   Lab Units 01/01/23  0659 12/31/22  1851 12/31/22  1542 12/31/22  1314 12/31/22  1241   PROCALCITONIN ng/mL  --  0.60*  --   --   --    LACTATE mmol/L 0.8  --  1.3  --  4.3*   CRP mg/dL  --   --   --  18.68*  --        I have personally looked at the labs and they are summarized above.  ----------------------------------------------------------------------------------------------------------------------  Detailed radiology reports for the last 24 hours:    Imaging Results (Last 24 Hours)     Procedure Component Value Units Date/Time    CT Abdomen Pelvis Without Contrast [[id number]] Collected: 12/31/22 1423     Updated: 12/31/22 1425    Narrative:      CT Abdomen Pelvis WO    INDICATION:   Nausea and vomiting for 4 weeks with elevated white blood cell count    TECHNIQUE:   CT of the abdomen and pelvis without IV contrast. Coronal and sagittal reconstructions were obtained.  Radiation dose reduction techniques included automated exposure control or exposure modulation based on body size. Count of known CT and cardiac nuc  med studies performed in  previous 12 months: 1.     COMPARISON:   None available.    FINDINGS:  Abdomen: Liver, gallbladder, spleen, pancreas, adrenal glands and kidneys are normal in appearance. Aorta is normal in size. There is no adenopathy. There is distention of all the small bowel and there is fluid mildly distending the right colon. Left  colon and sigmoid colon are mostly collapsed. Transverse colon is filled with air.    Pelvis: Bladder is normal. Uterus is been removed. There are no adnexal masses. Bones are unremarkable.      Impression:      The entire small bowel is mildly distended with fluid all way to the terminal ileum suggesting an ileus.    Colon is not overly distended but there is fluid within the right colon and air in the transverse colon.    Otherwise negative          Signer Name: Anthony London MD   Signed: 12/31/2022 2:23 PM   Workstation Name: [email or web address]    [facility]iology [facility]    CT Chest Without Contrast Diagnostic [[id number]] Collected: 12/31/22 1420     Updated: 12/31/22 1423    Narrative:      CT Chest WO    INDICATION:   Chest pain today    TECHNIQUE:   CT of the thorax without IV contrast. Coronal and sagittal reconstructions were obtained.  Radiation dose reduction techniques included automated exposure control or exposure modulation based on body size. Count of known CT and cardiac nuc med studies  performed in previous 12 months: 1.     COMPARISON:   11/23/2022    FINDINGS:  Is mild stable apical fibrotic change in the lungs are otherwise clear. Aorta is normal in size. There is no adenopathy. Right thyroid lobe is normal. Left thyroid lobe is small. Please see abdomen pelvic CT scan for findings below the diaphragm    There are degenerative changes of the thoracic spine      Impression:      No acute findings    Signer Name: Anthony London MD   Signed: 12/31/2022 2:20 PM   Workstation Name: [email or web address]    Radiology [facility] Gallbladder [[id number]] Collected: 12/31/22 8715      Updated: 12/31/22 7325    Narrative:      US Abdomen Ltd    INDICATION:   Right upper quadrant abdominal pain    COMPARISON:   None available.    FINDINGS:  Gallbladder is adequately distended. Wall is not thickened. No stones are visible. There is sludge present Visualized portions of liver normal. Portal vein shows flow into the liver. Common bile duct is 4 mm in diameter.      Impression:      Gallbladder sludge is noted otherwise normal gallbladder ultrasound    Signer Name: Anthony London MD   Signed: 12/31/2022 1:55 PM   Workstation Name: [email or web address]    [facility]        Assessment & Plan      #Shock  #Ileus   #Lactic acidosis (resolved)  #SIRS  - Patient has been adequately volume resuscitated but still requiring low dose levophed.   - No clear source for SIRS. I do not think that enteritis would cause this profound of hemodynamic changes.   - Ischemic small bowel on differential. Did not do urgent CTA on admission due to quick resolution of lactic acidosis and LOBITO. However, pain is mostly postprandial and mesenteric ischemia could bet intermittent.   - Discussed with surgery. Will hydrate today and plan on CTA abdomen/pelvis tomorrow. Will do it before then if symptoms worsen.   - Cannot rule out intraabdominal source. Will continue cefepime/flagyl for now with plans to deescalate if no source if found soon.     #LOBITO  - Unclear baseline but no history of CAD  - Cr much imroved 1.8=>1.2    #Acute hypoxic respiratory failure   #Elevated D-dimer  - CT chest unrevealing for paranchymal abnormality   - Tachycardia, elevated D-dimer, hypoxia concerning for PE  - V/Q scan ordered.     #Elevated proBNP  - Echo pending. No history of CHF.     #Tobacco use   - Recommend cessaiton. Will offer NRT    Chronic medical conditions   #HTN  #HLD  #Chronic back pain   #OA    F: mIVFs  E: Replace as needed   N:NPO    Code status: Full     Dispo: Continue PCU level of care. Pending clinical improvement      VTE Prophylaxis:   Mechanical Order History:     None      Pharmalogical Order History:      Ordered     Dose Route Frequency Stop    12/31/22 1838  heparin (porcine) 5000 UNIT/ML injection 5,000 Units         5,000 Units SC Every 8 Hours Scheduled --                  Nilton Fong MD  [facility]  01/01/23  13:44 EST

## 2023-01-01 NOTE — PLAN OF CARE
Goal Outcome Evaluation:               Pt is alert and oriented x4 on 2 L NC. Pt has been hypotensive throughout shift after IV  NS bolus. Levo was started to maintain adequate BP. MD aware of pt only having peripheral IV access. Pt complains of chronic lower back pain. Afebrile throughout shift. VSS. Alarms set. Call light in reach.

## 2023-01-01 NOTE — CONSULTS
Consulting physician:  Dr. Langston    Referring physician: cr    Date of consultation: 01/01/23     Chief complaint ileus    Subjective     Patient is a 77 y.o. female who presented to the emergency room with a 1 month history of nausea, vomiting, abdominal pain.  Patient states she is passing gas and has bowel movements when she takes a laxative.  Patient can tolerate liquids but states that when she eats something solid she gets severe abdominal pain and will often vomit within a few minutes of her p.o. intake.  Patient states that she feels she has lost maybe 8 to 10 pounds.  She denies a history of peptic ulcer disease.  She had a colonoscopy approximately 1 year ago which was unremarkable.  In the morning patient was hypotensive.  CT abdomen pelvis demonstrated findings compatible with ileus.  No evidence of bowel obstruction or other acute intra-abdominal pathology was identified other than gallbladder sludge within the gallbladder.      Review of Systems  Review of Systems - General ROS: Positive for - weight loss  Psychological ROS: negative for - behavioral disorder  Ophthalmic ROS: negative for - dry eyes  ENT ROS: negative for - vertigo or vocal changes  Hematological and Lymphatic ROS: negative for - swollen lymph nodes, DVT, PE.   Respiratory ROS: negative for - sputum changes or stridor.  Cardiovascular ROS: negative for - irregular heartbeat or murmur  Gastrointestinal ROS: negative for - blood in stools or change in stools  Genitourinary ROS: negative for - hematuria or incontinence  Musculoskeletal ROS: negative for - gait disturbance      History  Past Medical History:   Diagnosis Date   • Arthritis    • COPD (chronic obstructive pulmonary disease) (Formerly [facility])    • Elevated cholesterol    • Hyperlipidemia    • Hypertension    • Sepsis with acute renal failure without septic shock, due to unspecified organism, unspecified acute renal failure type (Formerly [facility]) 12/31/2022     Past Surgical History:    Procedure Laterality Date   • APPENDECTOMY     • COLONOSCOPY     • COLONOSCOPY N/A 12/15/2021    Procedure: COLONOSCOPY FOR SCREENING;  Surgeon: Rhonda Parada MD;  Location: [facility];  Service: Gastroenterology;  Laterality: N/A;   • EYE SURGERY     • HYSTERECTOMY     • SHOULDER SURGERY     • TUBAL ABDOMINAL LIGATION     • UPPER GASTROINTESTINAL ENDOSCOPY     • WRIST SURGERY       No family history on file.  Social History     Tobacco Use   • Smoking status: Every Day     Packs/day: 1.00     Years: 60.00     Pack years: 60.00     Types: Cigarettes   • Smokeless tobacco: Never   Vaping Use   • Vaping Use: Former   Substance Use Topics   • Alcohol use: Never   • Drug use: Never     Medications Prior to Admission   Medication Sig Dispense Refill Last Dose   • apixaban (ELIQUIS) 2.5 MG tablet tablet Take 2.5 mg by mouth 2 (Two) Times a Day.   12/31/2022 at 0900   • cyanocobalamin 1000 MCG/ML injection Inject 1,000 mcg under the skin into the appropriate area as directed Every 30 (Thirty) Days.   Past Week   • ferrous sulfate 325 (65 FE) MG tablet Take 325 mg by mouth 2 (Two) Times a Day.   12/31/2022 at 0900   • gabapentin (NEURONTIN) 600 MG tablet Take 600 mg by mouth 3 (Three) Times a Day.   12/31/2022 at 0900   • oxyCODONE-acetaminophen (PERCOCET)  MG per tablet Take 1 tablet by mouth 4 (Four) Times a Day.   12/31/2022 at 0900   • simvastatin (ZOCOR) 80 MG tablet Take 80 mg by mouth Every Night.   12/30/2022   • traZODone (DESYREL) 100 MG tablet Take 200 mg by mouth every night at bedtime.   12/30/2022   • apixaban (Eliquis) 5 MG tablet tablet Eliquis 5 mg tablet   take 1 tablet (5 mg) by oral route 2 times per day for 30 days      • diclofenac (VOLTAREN) 75 MG EC tablet diclofenac sodium 75 mg tablet,delayed release   TAKE 1 TABLET BY MOUTH TWICE DAILY AS NEEDED      • lisinopril (PRINIVIL,ZESTRIL) 10 MG tablet lisinopril 10 mg tablet   take 1 tablet (10 mg) by oral route once daily for 30 days       • meloxicam (MOBIC) 15 MG tablet meloxicam 15 mg tablet   TAKE 1 TABLET BY MOUTH EVERY DAY      • metoprolol tartrate (LOPRESSOR) 50 MG tablet metoprolol tartrate 50 mg tablet   take 1 tablet (50 mg) by oral route 2 times per day with meals for 30 days      • Naloxegol Oxalate (Movantik) 25 MG tablet Movantik 25 mg tablet   TAKE 1 TABLET BY MOUTH EVERY DAY      • tiotropium (Spiriva HandiHaler) 18 MCG per inhalation capsule Spiriva with HandiHaler 18 mcg and inhalation capsules        Allergies:  Penicillins and Sulfa antibiotics    Objective     Vital Signs  Temp:  [98.2 °F (36.8 °C)-98.8 °F (37.1 °C)] 98.2 °F (36.8 °C)  Heart Rate:  [] 93  Resp:  [17-26] 17  BP: ()/(42-80) 105/62    Physical Exam:  General:  This is a WD WN female in no acute distress  Vital signs: Stable, afebrile  HEENT exam:  WNL. Sclerae are anicteric.  EOMI  Neck:  Supple, FROM.  No JVD.  Trachea midline  Lungs:  Respiratory effort normal. Auscultation: Clear, without wheezes, rhonchi, rales  Heart:  Regular rate and rhythm, without murmur, gallop, rub.  No pedal edema  Abdomen: Hypoactive bowel sounds are present.  Patient denies tenderness to palpation.  The abdomen is soft without palpable mass, rebound, guarding  Musculoskeletal:  Muscle strength/tone is normal.    Psych:  Alert, oriented x 3.  Mood and affect are appropriate  Skin:  Warm with good turgor.  Without rash or lesion  Extremities:  Examination of the extremities revealed no cyanosis, clubbing or edema.    Results Review:   Results from last 7 days   Lab Units 12/31/22  1542 12/31/22  1314   TROPONIN T ng/mL <0.010 <0.010     Results from last 7 days   Lab Units 01/01/23  0659 01/01/23  0049 12/31/22  1542 12/31/22  1314 12/31/22  1241   CRP mg/dL  --   --   --  18.68*  --    LACTATE mmol/L 0.8  --  1.3  --  4.3*   WBC 10*3/mm3 14.24* 14.56*  --   --  18.74*   HEMOGLOBIN g/dL 10.1* 10.3*  --   --  13.4   HEMATOCRIT % 31.6* 32.5*  --   --  42.6   PLATELETS  10*3/mm3 285 269  --   --  420     Results from last 7 days   Lab Units 12/31/22  1654   PH, ARTERIAL pH units 7.405   PO2 ART mm Hg 51.7*   PCO2, ARTERIAL mm Hg 41.5   HCO3 ART mmol/L 26.0     Results from last 7 days   Lab Units 01/01/23  0049 12/31/22  1314 12/31/22  1242   SODIUM mmol/L 136 133*  --    POTASSIUM mmol/L 3.9 4.2  --    MAGNESIUM mg/dL  --   --  2.4   CHLORIDE mmol/L 98 89*  --    CO2 mmol/L 23.6 24.7  --    BUN mg/dL 51* 53*  --    CREATININE mg/dL 1.21* 1.83*  --    CALCIUM mg/dL 8.1* 9.1  --    GLUCOSE mg/dL 110* 109*  --    ALBUMIN g/dL 2.8* 3.6  --    BILIRUBIN mg/dL 0.3 0.5  --    ALK PHOS U/L 90 116  --    AST (SGOT) U/L 33* 38*  --    ALT (SGPT) U/L 16 19  --    Estimated Creatinine Clearance: 42.4 mL/min (A) (by C-G formula based on SCr of 1.21 mg/dL (H)).  No results found for: AMMONIA  Results from last 7 days   Lab Units 12/31/22  1542   CHOLESTEROL mg/dL 109   TRIGLYCERIDES mg/dL 127   HDL CHOL mg/dL 37*   LDL CHOL mg/dL 49     No results found for: BLOODCX  No results found for: URINECX  No results found for: WOUNDCX  No results found for: STOOLCX    Imaging:  Imaging Results (Last 24 Hours)     Procedure Component Value Units Date/Time    CT Abdomen Pelvis Without Contrast [[id number]] Collected: 12/31/22 1423     Updated: 12/31/22 1425    Narrative:      CT Abdomen Pelvis WO    INDICATION:   Nausea and vomiting for 4 weeks with elevated white blood cell count    TECHNIQUE:   CT of the abdomen and pelvis without IV contrast. Coronal and sagittal reconstructions were obtained.  Radiation dose reduction techniques included automated exposure control or exposure modulation based on body size. Count of known CT and cardiac nuc  med studies performed in previous 12 months: 1.     COMPARISON:   None available.    FINDINGS:  Abdomen: Liver, gallbladder, spleen, pancreas, adrenal glands and kidneys are normal in appearance. Aorta is normal in size. There is no adenopathy. There is distention  of all the small bowel and there is fluid mildly distending the right colon. Left  colon and sigmoid colon are mostly collapsed. Transverse colon is filled with air.    Pelvis: Bladder is normal. Uterus is been removed. There are no adnexal masses. Bones are unremarkable.      Impression:      The entire small bowel is mildly distended with fluid all way to the terminal ileum suggesting an ileus.    Colon is not overly distended but there is fluid within the right colon and air in the transverse colon.    Otherwise negative          Signer Name: Anthony London MD   Signed: 12/31/2022 2:23 PM   Workstation Name: [facility]    CT Chest Without Contrast Diagnostic [[id number]] Collected: 12/31/22 1420     Updated: 12/31/22 1423    Narrative:      CT Chest WO    INDICATION:   Chest pain today    TECHNIQUE:   CT of the thorax without IV contrast. Coronal and sagittal reconstructions were obtained.  Radiation dose reduction techniques included automated exposure control or exposure modulation based on body size. Count of known CT and cardiac nuc med studies  performed in previous 12 months: 1.     COMPARISON:   11/23/2022    FINDINGS:  Is mild stable apical fibrotic change in the lungs are otherwise clear. Aorta is normal in size. There is no adenopathy. Right thyroid lobe is normal. Left thyroid lobe is small. Please see abdomen pelvic CT scan for findings below the diaphragm    There are degenerative changes of the thoracic spine      Impression:      No acute findings    Signer Name: Anthony London MD   Signed: 12/31/2022 2:20 PM   Workstation Name: [email or web address]    Radiology [facility] Gallbladder [[id number]] Collected: 12/31/22 1355     Updated: 12/31/22 1357    Narrative:      US Abdomen Ltd    INDICATION:   Right upper quadrant abdominal pain    COMPARISON:   None available.    FINDINGS:  Gallbladder is adequately distended. Wall is not thickened. No stones are  visible. There is sludge present Visualized portions of liver normal. Portal vein shows flow into the liver. Common bile duct is 4 mm in diameter.      Impression:      Gallbladder sludge is noted otherwise normal gallbladder ultrasound    Signer Name: Anthony London MD   Signed: 12/31/2022 1:55 PM   Workstation Name: [email or web address]    [facility]          CT images were reviewed.  I agree with the assessment      Impression:  Patient Active Problem List   Diagnosis Code   • Preoperative clearance Z01.818   • Encounter for screening for malignant neoplasm of colon Z12.11   • Sepsis with acute renal failure without septic shock, due to unspecified organism, unspecified acute renal failure type (HCC) A41.9, R65.20, N17.9     Impression: Ileus of uncertain etiology.  In the absence of a source of sepsis would consider mesenteric ischemia in the differential diagnosis    Plan:  IV hydration  When creatinine acceptable would proceed with CTA abdomen and pelvis to evaluate for mesenteric ischemia  Okay to start clear liquid diet      Discussion:      Yamile Langston MD  01/01/23  13:14 EST    Time: Time spent:    Please note that portions of this note were completed with a voice recognition program.

## 2023-01-02 ENCOUNTER — APPOINTMENT (OUTPATIENT)
Dept: CT IMAGING | Facility: HOSPITAL | Age: 78
DRG: 388 | End: 2023-01-02
Payer: MEDICARE

## 2023-01-02 LAB
ANION GAP SERPL CALCULATED.3IONS-SCNC: 8.5 MMOL/L (ref 5–15)
BUN SERPL-MCNC: 13 MG/DL (ref 8–23)
BUN/CREAT SERPL: 25.5 (ref 7–25)
CALCIUM SPEC-SCNC: 7.7 MG/DL (ref 8.6–10.5)
CHLORIDE SERPL-SCNC: 105 MMOL/L (ref 98–107)
CO2 SERPL-SCNC: 24.5 MMOL/L (ref 22–29)
CREAT SERPL-MCNC: 0.51 MG/DL (ref 0.57–1)
DEPRECATED RDW RBC AUTO: 49 FL (ref 37–54)
EGFRCR SERPLBLD CKD-EPI 2021: 96.3 ML/MIN/1.73
ERYTHROCYTE [DISTWIDTH] IN BLOOD BY AUTOMATED COUNT: 13.2 % (ref 12.3–15.4)
GLUCOSE SERPL-MCNC: 125 MG/DL (ref 65–99)
HCT VFR BLD AUTO: 30.5 % (ref 34–46.6)
HGB BLD-MCNC: 9.8 G/DL (ref 12–15.9)
MCH RBC QN AUTO: 32.7 PG (ref 26.6–33)
MCHC RBC AUTO-ENTMCNC: 32.1 G/DL (ref 31.5–35.7)
MCV RBC AUTO: 101.7 FL (ref 79–97)
PLATELET # BLD AUTO: 209 10*3/MM3 (ref 140–450)
PMV BLD AUTO: 9.2 FL (ref 6–12)
POTASSIUM SERPL-SCNC: 3.1 MMOL/L (ref 3.5–5.2)
RBC # BLD AUTO: 3 10*6/MM3 (ref 3.77–5.28)
SODIUM SERPL-SCNC: 138 MMOL/L (ref 136–145)
WBC NRBC COR # BLD: 9.74 10*3/MM3 (ref 3.4–10.8)

## 2023-01-02 PROCEDURE — 94799 UNLISTED PULMONARY SVC/PX: CPT

## 2023-01-02 PROCEDURE — 99233 SBSQ HOSP IP/OBS HIGH 50: CPT | Performed by: INTERNAL MEDICINE

## 2023-01-02 PROCEDURE — 94762 N-INVAS EAR/PLS OXIMTRY CONT: CPT

## 2023-01-02 PROCEDURE — 94761 N-INVAS EAR/PLS OXIMETRY MLT: CPT

## 2023-01-02 PROCEDURE — 85027 COMPLETE CBC AUTOMATED: CPT | Performed by: SURGERY

## 2023-01-02 PROCEDURE — 99232 SBSQ HOSP IP/OBS MODERATE 35: CPT | Performed by: SURGERY

## 2023-01-02 PROCEDURE — 25010000002 CEFEPIME PER 500 MG: Performed by: INTERNAL MEDICINE

## 2023-01-02 PROCEDURE — 74178 CT ABD&PLV WO CNTR FLWD CNTR: CPT

## 2023-01-02 PROCEDURE — 25010000002 IOPAMIDOL 61 % SOLUTION: Performed by: INTERNAL MEDICINE

## 2023-01-02 PROCEDURE — 25010000002 HEPARIN (PORCINE) PER 1000 UNITS: Performed by: INTERNAL MEDICINE

## 2023-01-02 PROCEDURE — 74178 CT ABD&PLV WO CNTR FLWD CNTR: CPT | Performed by: RADIOLOGY

## 2023-01-02 PROCEDURE — 80048 BASIC METABOLIC PNL TOTAL CA: CPT | Performed by: SURGERY

## 2023-01-02 RX ORDER — METOPROLOL TARTRATE 100 MG/1
100 TABLET ORAL ONCE AS NEEDED
Status: DISCONTINUED | OUTPATIENT
Start: 2023-01-02 | End: 2023-01-02

## 2023-01-02 RX ORDER — SODIUM CHLORIDE 0.9 % (FLUSH) 0.9 %
10 SYRINGE (ML) INJECTION AS NEEDED
Status: DISCONTINUED | OUTPATIENT
Start: 2023-01-02 | End: 2023-01-02

## 2023-01-02 RX ORDER — SODIUM CHLORIDE 0.9 % (FLUSH) 0.9 %
3 SYRINGE (ML) INJECTION EVERY 12 HOURS SCHEDULED
Status: DISCONTINUED | OUTPATIENT
Start: 2023-01-02 | End: 2023-01-02

## 2023-01-02 RX ORDER — DEXTROSE, SODIUM CHLORIDE, AND POTASSIUM CHLORIDE 5; .45; .15 G/100ML; G/100ML; G/100ML
50 INJECTION INTRAVENOUS CONTINUOUS
Status: DISCONTINUED | OUTPATIENT
Start: 2023-01-02 | End: 2023-01-05 | Stop reason: HOSPADM

## 2023-01-02 RX ORDER — METOPROLOL TARTRATE 50 MG/1
50 TABLET, FILM COATED ORAL ONCE AS NEEDED
Status: DISCONTINUED | OUTPATIENT
Start: 2023-01-02 | End: 2023-01-02

## 2023-01-02 RX ORDER — LIDOCAINE HYDROCHLORIDE 10 MG/ML
5 INJECTION, SOLUTION EPIDURAL; INFILTRATION; INTRACAUDAL; PERINEURAL AS NEEDED
Status: DISCONTINUED | OUTPATIENT
Start: 2023-01-02 | End: 2023-01-02

## 2023-01-02 RX ADMIN — OXYCODONE HYDROCHLORIDE AND ACETAMINOPHEN 1 TABLET: 10; 325 TABLET ORAL at 08:30

## 2023-01-02 RX ADMIN — GABAPENTIN 300 MG: 300 CAPSULE ORAL at 13:34

## 2023-01-02 RX ADMIN — Medication 10 ML: at 08:31

## 2023-01-02 RX ADMIN — GABAPENTIN 300 MG: 300 CAPSULE ORAL at 06:35

## 2023-01-02 RX ADMIN — TRAZODONE HYDROCHLORIDE 25 MG: 50 TABLET ORAL at 02:02

## 2023-01-02 RX ADMIN — CEFEPIME 2 G: 2 INJECTION, POWDER, FOR SOLUTION INTRAVENOUS at 08:30

## 2023-01-02 RX ADMIN — GABAPENTIN 300 MG: 300 CAPSULE ORAL at 21:41

## 2023-01-02 RX ADMIN — Medication 10 ML: at 21:43

## 2023-01-02 RX ADMIN — IOPAMIDOL 100 ML: 612 INJECTION, SOLUTION INTRAVENOUS at 15:15

## 2023-01-02 RX ADMIN — OXYCODONE HYDROCHLORIDE AND ACETAMINOPHEN 1 TABLET: 10; 325 TABLET ORAL at 17:09

## 2023-01-02 RX ADMIN — Medication 10 ML: at 21:44

## 2023-01-02 RX ADMIN — OXYCODONE HYDROCHLORIDE AND ACETAMINOPHEN 1 TABLET: 10; 325 TABLET ORAL at 23:56

## 2023-01-02 RX ADMIN — IPRATROPIUM BROMIDE 0.5 MG: 0.5 SOLUTION RESPIRATORY (INHALATION) at 13:23

## 2023-01-02 RX ADMIN — HEPARIN SODIUM 5000 UNITS: 5000 INJECTION INTRAVENOUS; SUBCUTANEOUS at 13:34

## 2023-01-02 RX ADMIN — IPRATROPIUM BROMIDE 0.5 MG: 0.5 SOLUTION RESPIRATORY (INHALATION) at 00:41

## 2023-01-02 RX ADMIN — METRONIDAZOLE 500 MG: 500 INJECTION, SOLUTION INTRAVENOUS at 21:34

## 2023-01-02 RX ADMIN — METRONIDAZOLE 500 MG: 500 INJECTION, SOLUTION INTRAVENOUS at 13:34

## 2023-01-02 RX ADMIN — SODIUM CHLORIDE 100 ML/HR: 9 INJECTION, SOLUTION INTRAVENOUS at 06:35

## 2023-01-02 RX ADMIN — HEPARIN SODIUM 5000 UNITS: 5000 INJECTION INTRAVENOUS; SUBCUTANEOUS at 06:34

## 2023-01-02 RX ADMIN — OXYCODONE HYDROCHLORIDE AND ACETAMINOPHEN 1 TABLET: 10; 325 TABLET ORAL at 11:55

## 2023-01-02 RX ADMIN — POTASSIUM CHLORIDE, DEXTROSE MONOHYDRATE AND SODIUM CHLORIDE 75 ML/HR: 150; 5; 450 INJECTION, SOLUTION INTRAVENOUS at 11:55

## 2023-01-02 RX ADMIN — FERROUS SULFATE TAB 325 MG (65 MG ELEMENTAL FE) 325 MG: 325 (65 FE) TAB at 21:33

## 2023-01-02 RX ADMIN — TRAZODONE HYDROCHLORIDE 25 MG: 50 TABLET ORAL at 21:33

## 2023-01-02 RX ADMIN — METRONIDAZOLE 500 MG: 500 INJECTION, SOLUTION INTRAVENOUS at 06:34

## 2023-01-02 RX ADMIN — IPRATROPIUM BROMIDE 0.5 MG: 0.5 SOLUTION RESPIRATORY (INHALATION) at 18:32

## 2023-01-02 RX ADMIN — ATORVASTATIN CALCIUM 40 MG: 40 TABLET, FILM COATED ORAL at 21:33

## 2023-01-02 RX ADMIN — HEPARIN SODIUM 5000 UNITS: 5000 INJECTION INTRAVENOUS; SUBCUTANEOUS at 21:33

## 2023-01-02 RX ADMIN — FERROUS SULFATE TAB 325 MG (65 MG ELEMENTAL FE) 325 MG: 325 (65 FE) TAB at 08:30

## 2023-01-02 NOTE — PROGRESS NOTES
LOS: 2 days   Patient Care Team:  Cristofer Garcia MD as PCP - General (Family Medicine)    Surgery follow up for ileus, nausea and vomiting    Subjective     Interval History:  No issues overnight.  Patient had clear liquids yesterday which she states she tolerated without pain, nausea, vomiting.  She states she is hungry now.  She is n.p.o. for a CT angio for possible mesenteric ischemia.    History taken from patient.      Review of Systems:   Review of systems negative except for history of present illness    Objective     Vital Signs  Temp:  [98 °F (36.7 °C)-98.6 °F (37 °C)] 98 °F (36.7 °C)  Heart Rate:  [] 66  Resp:  [16] 16  BP: ()/() 104/57    Physical Exam:  General:  This is a WD WN female in no acute distress  Vital signs stable, afebrile  HEENT exam:  WNL. Sclera are anicteric.  EOMI  Neck:  supple, FROM.  No JVD.  Trachea midline  Lungs:  Respiratory effort normal. Auscultation: Clear, without wheezes, rhonchi, rales  Heart:  Regular rate and rhythm, without murmur, gallop, rub.  No pedal edema  Abdomen: Bowel sounds are very hypoactive.  Abdomen is soft, nontender, nondistended.  No palpable mass.  No rebound or guarding.  Musculoskeletal: Muscle movement is symmetrical  Psyc:  alert, oriented x 3.  Mood and affect are appropriate  skin:  Warm with good turgor.  Without rash or lesion  extremities:  Examination of the extremities revealed no cyanosis, clubbing or edema.     Results Review:    Results from last 7 days   Lab Units 12/31/22  1542 12/31/22  1314   TROPONIN T ng/mL <0.010 <0.010     Results from last 7 days   Lab Units 01/02/23  0946 01/01/23  0659 01/01/23  0049 12/31/22  1542 12/31/22  1314 12/31/22  1241   CRP mg/dL  --   --   --   --  18.68*  --    LACTATE mmol/L  --  0.8  --  1.3  --  4.3*   WBC 10*3/mm3 9.74 14.24* 14.56*  --   --  18.74*   HEMOGLOBIN g/dL 9.8* 10.1* 10.3*  --   --  13.4   HEMATOCRIT % 30.5* 31.6* 32.5*  --   --  42.6   PLATELETS 10*3/mm3  209 285 269  --   --  420     Results from last 7 days   Lab Units 12/31/22  1654   PH, ARTERIAL pH units 7.405   PO2 ART mm Hg 51.7*   PCO2, ARTERIAL mm Hg 41.5   HCO3 ART mmol/L 26.0     Results from last 7 days   Lab Units 01/02/23  0946 01/01/23  0049 12/31/22  1314 12/31/22  1242   SODIUM mmol/L 138 136 133*  --    POTASSIUM mmol/L 3.1* 3.9 4.2  --    MAGNESIUM mg/dL  --   --   --  2.4   CHLORIDE mmol/L 105 98 89*  --    CO2 mmol/L 24.5 23.6 24.7  --    BUN mg/dL 13 51* 53*  --    CREATININE mg/dL 0.51* 1.21* 1.83*  --    CALCIUM mg/dL 7.7* 8.1* 9.1  --    GLUCOSE mg/dL 125* 110* 109*  --    ALBUMIN g/dL  --  2.8* 3.6  --    BILIRUBIN mg/dL  --  0.3 0.5  --    ALK PHOS U/L  --  90 116  --    AST (SGOT) U/L  --  33* 38*  --    ALT (SGPT) U/L  --  16 19  --    Estimated Creatinine Clearance: 101.9 mL/min (A) (by C-G formula based on SCr of 0.51 mg/dL (L)).  No results found for: AMMONIA  Results from last 7 days   Lab Units 12/31/22  1542   CHOLESTEROL mg/dL 109   TRIGLYCERIDES mg/dL 127   HDL CHOL mg/dL 37*   LDL CHOL mg/dL 49     Blood Culture   Date Value Ref Range Status   12/31/2022 No growth at 2 days  Preliminary   12/31/2022 No growth at 2 days  Preliminary     No results found for: URINECX  No results found for: WOUNDCX  No results found for: STOOLCX    Imaging:  Imaging Results (Last 24 Hours)     Procedure Component Value Units Date/Time    NM Lung Ventilation Perfusion [[id number]] Collected: 01/01/23 1532     Updated: 01/01/23 1534    Narrative:      NM Pulm Vent And Perf    HISTORY:  Chest pain. Hypotension. Elevated d-dimer level.    DOSE:  *  44 mCi Tc99m DTPA aerosol.  *  4.5 mCi Tc99m MAA.    COMPARISON:   Chest x-ray 1/1/2023.    FINDINGS:   There is some clumping of the radiopharmaceutical in the larger airways, as well as in the trachea. Ventilation tracer deposition is somewhat heterogeneous, probably secondary to COPD. No V/Q mismatches are identified. There are no segmental  perfusion  defects.      Impression:      Low probability ventilation/perfusion scan for pulmonary embolus.    Signer Name: Felix Macias MD   Signed: 1/1/2023 3:32 PM   Workstation Name: [email or web address]    [facility]    XR Chest 1 View [[id number]] Collected: 01/01/23 1456     Updated: 01/01/23 1458    Narrative:      CR Chest 1 Vw    INDICATION:   Line placement. Sepsis.     COMPARISON:    12/31/2022    FINDINGS:  Single portable AP view(s) of the chest.    Left subclavian line tip is in the SVC. No pneumothorax is identified. Atherosclerotic disease is present in the aorta. Heart size is normal. No acute infiltrates are seen.       Impression:      Line tip in the SVC with no pneumothorax identified.    Signer Name: Felix Macias MD   Signed: 1/1/2023 2:56 PM   Workstation Name: [email or web address]    [facility]        Impression:  Ileus of unclear etiology    Plan:  Await results of CT angio    Yamile Langston MD  01/02/23  13:48 EST      Please note that portions of this note were completed with a voice recognition program.

## 2023-01-02 NOTE — PLAN OF CARE
Goal Outcome Evaluation:  Plan of Care Reviewed With: patient        Progress: no change  Outcome Evaluation: Pt A/Ox4. VSS. Pt remains on RA, tolerating well. Fluids infusing at 75 mL/hr. Pt has had no complaints of chest pain or shortness of breath. Scheduled medications provided for c/o back pain. Call light in reach, bed locked and lowest level. Will continue to monitor.

## 2023-01-02 NOTE — PLAN OF CARE
Problem: Skin Injury Risk Increased  Goal: Skin Health and Integrity  Outcome: Ongoing, Progressing     Problem: Adult Inpatient Plan of Care  Goal: Plan of Care Review  Outcome: Ongoing, Progressing  Goal: Patient-Specific Goal (Individualized)  Outcome: Ongoing, Progressing  Goal: Absence of Hospital-Acquired Illness or Injury  Outcome: Ongoing, Progressing  Intervention: Prevent and Manage VTE (Venous Thromboembolism) Risk  Recent Flowsheet Documentation  Taken 1/1/2023 2000 by Cyndi De Leon RN  VTE Prevention/Management: (see MAR) other (see comments)  Goal: Optimal Comfort and Wellbeing  Outcome: Ongoing, Progressing  Intervention: Provide Person-Centered Care  Recent Flowsheet Documentation  Taken 1/1/2023 2000 by Cyndi De Leon RN  Trust Relationship/Rapport: care explained  Goal: Readiness for Transition of Care  Outcome: Ongoing, Progressing     Problem: Fall Injury Risk  Goal: Absence of Fall and Fall-Related Injury  Outcome: Ongoing, Progressing  Intervention: Identify and Manage Contributors  Recent Flowsheet Documentation  Taken 1/1/2023 2000 by Cyndi De Leon RN  Medication Review/Management: medications reviewed   Goal Outcome Evaluation:

## 2023-01-02 NOTE — CONSULTS
Date of Admit: 12/31/2022  Date of Consult: 01/02/23  Provider, No Known        Reason for consultation: Possible HOCM  Assessment      1. Possible HOCM, high gradient noted in the proximal left ventricle on echocardiogram.  2. Elevated pro BNP, 2108, negative troponin level  3. Systolic murmur in the aortic region and much louder bruit heard in carotids bilaterally       -Acute small bowel ileus, present on admission  -Septic shock criteria met on admission with HR>90, WBCs>12, Lactate>4, and CRP elevation likely 2/2 above  -Elevated anion gap metabolic acidosis, POA  -Mildly elevated AST, POA  -Acute kidney injury, POA  -Mild hyponatremia, POA  -Mild hypochloremia, POA  Hypokalemia  COPD  Hypertension  Anemia    Recommendations     1. In my opinion, echocardiogram finding is an incidental finding.  2. I do not think HOCM will present with septic syndrome on the first encounter.  3. I will have Dr. Theodore give second opinion regarding HOCM and further management tomorrow.  Patient is on antibiotics as per the admitting physician.    Carotid ultrasound to evaluate loud bruit versus transmitted murmur    Follow daily intake and output, daily weights, electrolytes and renal function carefully.  Replace electrolytes as needed.    Thank you very much for giving me opportunity to participate in the management of this patient.    Copied text in this note has been reviewed and is accurate as of 1/2/2023.      Cristobal Coreas MD  Cardiology service.            Subjective       Subjective     History of Present Illness    Yamile Angel is a 77 y.o. female with past medical history significant for COPD on room air at baseline, hyperlipidemia, essential hypertension, and arthritis.  Patient presents to [facility] emergency department today for further evaluation of nausea, vomiting, and abdominal pain which has been ongoing for approximately 4 weeks.  Patient states that she has been able to tolerate clear liquids,  however solid food usually results in vomiting.  She also reports generalized abdominal pain, worse in the mid epigastric region.  She reports history of hiatal hernia.  Denies any recent surgical procedures.  Reports constipation which has been ongoing for several weeks.  States that last bowel movement was 5 days ago.  States that she has had subjective, low-grade fever over the last couple of days.  She finally felt so weak and fatigued that she decided to present to the ED for further evaluation.  States that due to poor oral intake, she has experienced lightheadedness and dizziness with standing.  She reports weight loss, unsure of amount.  Denies any chest pain.  Does report some shortness of breath and intermittent productive cough with green sputum which has been ongoing for approximately 3 weeks.  She reports occasional wheezing.  Denies use of supplemental O2 at home.  Currently on room air and stable.  Lung sounds CTA bilaterally.  Breathing is even and unlabored.  Patient also reports decreased urine output and frequency.  Denies burning with urination.  She admits to smoking 1 pack of cigarettes per day.  Denies need for nicotine patch at this time.  Denies alcohol abuse.  States that she currently lives alone and is compliant with home medication regimen.  Ambulates independently without assistive devices.  Denies any recent falls or injuries.  Plan to hold patient n.p.o.  General surgery consult placed for further eval.  Will initiate cefepime and Flagyl on admission.  Obtain echo in light of elevated proBNP.  No peripheral edema appreciated on exam    Cardiology has been consulted to evaluate the patient for HOCM causing shock syndrome.  Patient has not seen a cardiologist in the past.  No cardiac work-up in the past.      Last Echo: 1/1/2023  •  Left ventricular ejection fraction appears to be 61 - 65%.  •  Left ventricular wall thickness is consistent with mild septal asymmetric hypertrophy.  •   Left ventricular diastolic function is consistent with (grade I) impaired relaxation.  •  Left ventricular intracavitary gradient noted to be 70 mmHg.  This may represent dynamic  proximal ventricular obstruction.  Clinical correlation suggested.             Past Medical History:   Diagnosis Date   • Arthritis    • COPD (chronic obstructive pulmonary disease) (HCC)    • Elevated cholesterol    • Hyperlipidemia    • Hypertension    • Sepsis with acute renal failure without septic shock, due to unspecified organism, unspecified acute renal failure type (HCC) 12/31/2022     Past Surgical History:   Procedure Laterality Date   • APPENDECTOMY     • COLONOSCOPY     • COLONOSCOPY N/A 12/15/2021    Procedure: COLONOSCOPY FOR SCREENING;  Surgeon: Rhonda Parada MD;  Location: [facility];  Service: Gastroenterology;  Laterality: N/A;   • EYE SURGERY     • HYSTERECTOMY     • SHOULDER SURGERY     • TUBAL ABDOMINAL LIGATION     • UPPER GASTROINTESTINAL ENDOSCOPY     • WRIST SURGERY       No family history on file.  Social History     Tobacco Use   • Smoking status: Every Day     Packs/day: 1.00     Years: 60.00     Pack years: 60.00     Types: Cigarettes   • Smokeless tobacco: Never   Vaping Use   • Vaping Use: Former   Substance Use Topics   • Alcohol use: Never   • Drug use: Never     Medications Prior to Admission   Medication Sig Dispense Refill Last Dose   • apixaban (ELIQUIS) 2.5 MG tablet tablet Take 2.5 mg by mouth 2 (Two) Times a Day.   12/31/2022   • cyanocobalamin 1000 MCG/ML injection Inject 1,000 mcg under the skin into the appropriate area as directed Every 30 (Thirty) Days.   Past Week   • ferrous sulfate 325 (65 FE) MG tablet Take 325 mg by mouth 2 (Two) Times a Day.   12/31/2022 at 0900   • gabapentin (NEURONTIN) 600 MG tablet Take 600 mg by mouth 3 (Three) Times a Day.   12/31/2022 at 0900   • lisinopril-hydrochlorothiazide (PRINZIDE,ZESTORETIC) 20-25 MG per tablet Take 1 tablet by mouth Daily.    12/31/2022   • metoprolol tartrate (LOPRESSOR) 50 MG tablet Take 50 mg by mouth 2 (Two) Times a Day.   12/31/2022   • oxyCODONE-acetaminophen (PERCOCET)  MG per tablet Take 1 tablet by mouth 4 (Four) Times a Day.   12/31/2022 at 0900   • simvastatin (ZOCOR) 80 MG tablet Take 80 mg by mouth Every Night.   12/30/2022   • traZODone (DESYREL) 100 MG tablet Take 200 mg by mouth every night at bedtime.   12/30/2022   • ondansetron (ZOFRAN) 8 MG tablet Take 8 mg by mouth 2 (Two) Times a Day As Needed for Nausea or Vomiting.   Unknown     Allergies:  Penicillins and Sulfa antibiotics    Review of Systems   Constitutional: Positive for fatigue and fever (subjective, low-grade). Negative for chills and diaphoresis.   HENT: Negative for congestion, sinus pressure, sinus pain, sore throat and trouble swallowing.    Respiratory: Positive for cough (x3 weeks, productive) and shortness of breath (occasional). Negative for choking and chest tightness.    Cardiovascular: Negative for chest pain, palpitations and leg swelling.   Gastrointestinal: Positive for abdominal pain (generalized), constipation (last BM 5 days ago), nausea and vomiting. Negative for blood in stool and diarrhea.   Genitourinary: Positive for decreased urine volume and frequency. Negative for dysuria and flank pain.   Musculoskeletal: Positive for back pain (chronic). Negative for gait problem, neck pain and neck stiffness.   Neurological: Positive for dizziness, weakness (generalized) and light-headedness. Negative for tremors, seizures, syncope, facial asymmetry, speech difficulty, numbness and headaches.       Objective       Objective      Vital Signs  Temp:  [98.2 °F (36.8 °C)-98.6 °F (37 °C)] 98.2 °F (36.8 °C)  Heart Rate:  [] 82  Resp:  [16] 16  BP: ()/() 106/69  Vital Signs (last 72 hrs)       12/30 0700  12/31 0659 12/31 0700  01/01 0659 01/01 0700  01/02 0659 01/02 0700 01/02 1118   Most Recent      Temp (°F)   98.2 -  98.8     98.2 -  98.6      98.2     98.2 (36.8) 01/02 0800    Heart Rate   91 -  121    75 -  115    64 -  101     82 01/02 1102    Resp   20 -  26    16 -  17       16 01/02 0051    BP   60/42 -  130/72    91/54 -  169/156    106/69 -  135/61     106/69 01/02 1102    SpO2 (%)   91 -  100    91 -  100    90 -  98     94 01/02 1102        Body mass index is 24.14 kg/m².  Documented weights    12/31/22 1125 01/01/23 0500 01/02/23 0400   Weight: 68 kg (150 lb) 68.9 kg (151 lb 14.4 oz) 69.9 kg (154 lb 1.6 oz)            Intake/Output Summary (Last 24 hours) at 1/2/2023 1118  Last data filed at 1/2/2023 0800  Gross per 24 hour   Intake 1123.64 ml   Output 1500 ml   Net -376.36 ml     Physical Exam    > GENERAL: The patient is well developed, well nourished, in no acute distress.  White female  > HEENT: Conjunctivae pink and moist. No evidence of xanthelasma.  > NECK: No JVD. No thyromegaly.  > LUNGS: Clear bilaterally. Normal respiratory effort.  > HEART: Regular rate and rhythm.  3/6 systolic murmur aortic region, gallop, or rub. PMI is nonpalpable.  > ABDOMEN: Soft, nontender. Bowel sounds present. No hepatosplenomegaly.  > EXTREMITIES: No edema of the lower extremities. No varicose veins. No clubbing. No cyanosis.  > NEUROLOGIC: Alert and oriented x 3. The patient moves all 4 extremities well.  > PSYCHIATRIC: No evidence of significant anxiety or depression.  > VASCULAR: Carotids with normal upstrokes bilaterally.  Bilateral bruits question transmitted murmur. Abdominal aorta not palpable. Femoral pulses 4+ palpable bilaterally.  Pedal pulses  not palpable bilaterally.  > Skin: Negative for rash.  > Musculoskeletal: No kyphosis or scoliosis.       Results review     Results Review:    I reviewed the patient's new clinical results.  Results from last 7 days   Lab Units 12/31/22  1542 12/31/22  1314   TROPONIN T ng/mL <0.010 <0.010     Results from last 7 days   Lab Units 01/02/23  0946 01/01/23  0659 01/01/23  0049  12/31/22  1241   WBC 10*3/mm3 9.74 14.24* 14.56* 18.74*   HEMOGLOBIN g/dL 9.8* 10.1* 10.3* 13.4   PLATELETS 10*3/mm3 209 285 269 420     Results from last 7 days   Lab Units 01/02/23  0946 01/01/23  0049 12/31/22  1314   SODIUM mmol/L 138 136 133*   POTASSIUM mmol/L 3.1* 3.9 4.2   CHLORIDE mmol/L 105 98 89*   CO2 mmol/L 24.5 23.6 24.7   BUN mg/dL 13 51* 53*   CREATININE mg/dL 0.51* 1.21* 1.83*   CALCIUM mg/dL 7.7* 8.1* 9.1   GLUCOSE mg/dL 125* 110* 109*   ALT (SGPT) U/L  --  16 19   AST (SGOT) U/L  --  33* 38*     No results found for: INR  Lab Results   Component Value Date    MG 2.4 12/31/2022     Lab Results   Component Value Date    TSH 0.286 01/01/2023    TRIG 127 12/31/2022    HDL 37 (L) 12/31/2022    LDL 49 12/31/2022      No results found for: BNP             ECG/EMG Results (last 24 hours)     Procedure Component Value Units Date/Time    Adult Transthoracic Echo Complete W/ Cont if Necessary Per Protocol [[id number]] Resulted: 01/01/23 1308     Updated: 01/01/23 1328     Target HR (85%) 122 bpm      Max. Pred. HR (100%) 143 bpm      LVIDd 4.0 cm      LVIDs 3.4 cm      IVSd 1.40 cm      LVPWd 1.00 cm      FS 15.0 %      IVS/LVPW 1.40 cm      ESV(cubed) 39.3 ml      LV Sys Vol (BSA corrected) 12.3 cm2      EDV(cubed) 64.0 ml      LV Hyatt Vol (BSA corrected) 25.5 cm2      LVOT area 3.1 cm2      LV mass(C)d 165.5 grams      LVOT diam 2.00 cm      EDV(MOD-sp4) 45.7 ml      ESV(MOD-sp4) 22.1 ml      SV(MOD-sp4) 23.6 ml      SI(MOD-sp4) 13.2 ml/m2      EF(MOD-sp4) 51.6 %      MV E max alvino 61.3 cm/sec      MV A max alvino 135.0 cm/sec      MV E/A 0.45     LA ESV Index (BP) 33.5 ml/m2      Med Peak E' Alvino 5.6 cm/sec      Lat Peak E' Alvino 6.2 cm/sec      Avg E/e' ratio 10.39     TAPSE (>1.6) 1.89 cm      LA dimension (2D)  2.5 cm      Ao pk alvino 166.0 cm/sec      Ao max PG 11.0 mmHg      Ao mean PG 8.0 mmHg      Ao V2 VTI 36.8 cm      Ao root diam 3.3 cm      LV basal cavitary gradient 70 mmHg     Narrative:      •  Left  ventricular ejection fraction appears to be 61 - 65%.  •  Left ventricular wall thickness is consistent with mild septal   asymmetric hypertrophy.  •  Left ventricular diastolic function is consistent with (grade I)   impaired relaxation.  •  Left ventricular intracavitary gradient noted to be 70 mmHg.  This may   represent dynamic  proximal ventricular obstruction.  Clinical correlation   suggested.      ECG 12 Lead Other; nausea/vomiting [[id number]] Collected: 12/31/22 1214     Updated: 01/01/23 1958     QT Interval 338 ms      QTC Interval 467 ms     Narrative:      Test Reason : Other~  Blood Pressure :   */*   mmHG  Vent. Rate : 115 BPM     Atrial Rate : 115 BPM     P-R Int : 126 ms          QRS Dur :  88 ms      QT Int : 338 ms       P-R-T Axes :  36  33  43 degrees     QTc Int : 467 ms    Sinus tachycardia with premature atrial complexes  Minimal voltage criteria for LVH, may be normal variant  Borderline ECG  No previous ECGs available  Confirmed by Cristobal Coreas ([id number]) on 1/1/2023 7:58:38 PM    Referred By: CORI           Confirmed By: Cristobal Coreas    SCANNED - TELEMETRY   [[id number]] Resulted: 12/31/22     Updated: 01/02/23 0939    SCANNED - TELEMETRY   [[id number]] Resulted: 12/31/22     Updated: 01/02/23 0949    SCANNED - TELEMETRY   [[id number]] Resulted: 12/31/22     Updated: 01/02/23 1020    SCANNED - TELEMETRY   [[id number]] Resulted: 12/31/22     Updated: 01/02/23 1029    SCANNED - TELEMETRY   [[id number]] Resulted: 12/31/22     Updated: 01/02/23 1051          Imaging Results (Last 72 Hours)     Procedure Component Value Units Date/Time    NM Lung Ventilation Perfusion [[id number]] Collected: 01/01/23 1532     Updated: 01/01/23 1534    Narrative:      NM Pulm Vent And Perf    HISTORY:  Chest pain. Hypotension. Elevated d-dimer level.    DOSE:  *  44 mCi Tc99m DTPA aerosol.  *  4.5 mCi Tc99m MAA.    COMPARISON:   Chest x-ray 1/1/2023.    FINDINGS:   There is some clumping of the radiopharmaceutical in the  larger airways, as well as in the trachea. Ventilation tracer deposition is somewhat heterogeneous, probably secondary to COPD. No V/Q mismatches are identified. There are no segmental perfusion  defects.      Impression:      Low probability ventilation/perfusion scan for pulmonary embolus.    Signer Name: Felix Macias MD   Signed: 1/1/2023 3:32 PM   Workstation Name: [email or web address]    [facility]    XR Chest 1 View [[id number]] Collected: 01/01/23 1456     Updated: 01/01/23 1458    Narrative:      CR Chest 1 Vw    INDICATION:   Line placement. Sepsis.     COMPARISON:    12/31/2022    FINDINGS:  Single portable AP view(s) of the chest.    Left subclavian line tip is in the SVC. No pneumothorax is identified. Atherosclerotic disease is present in the aorta. Heart size is normal. No acute infiltrates are seen.       Impression:      Line tip in the SVC with no pneumothorax identified.    Signer Name: Felix Macias MD   Signed: 1/1/2023 2:56 PM   Workstation Name: [email or web address]    [facility]    CT Abdomen Pelvis Without Contrast [[id number]] Collected: 12/31/22 1423     Updated: 12/31/22 1425    Narrative:      CT Abdomen Pelvis WO    INDICATION:   Nausea and vomiting for 4 weeks with elevated white blood cell count    TECHNIQUE:   CT of the abdomen and pelvis without IV contrast. Coronal and sagittal reconstructions were obtained.  Radiation dose reduction techniques included automated exposure control or exposure modulation based on body size. Count of known CT and cardiac nuc  med studies performed in previous 12 months: 1.     COMPARISON:   None available.    FINDINGS:  Abdomen: Liver, gallbladder, spleen, pancreas, adrenal glands and kidneys are normal in appearance. Aorta is normal in size. There is no adenopathy. There is distention of all the small bowel and there is fluid mildly distending the right colon. Left  colon and sigmoid colon are mostly collapsed.  Transverse colon is filled with air.    Pelvis: Bladder is normal. Uterus is been removed. There are no adnexal masses. Bones are unremarkable.      Impression:      The entire small bowel is mildly distended with fluid all way to the terminal ileum suggesting an ileus.    Colon is not overly distended but there is fluid within the right colon and air in the transverse colon.    Otherwise negative          Signer Name: Anthony London MD   Signed: 12/31/2022 2:23 PM   Workstation Name: [facility]    CT Chest Without Contrast Diagnostic [[id number]] Collected: 12/31/22 1420     Updated: 12/31/22 1423    Narrative:      CT Chest WO    INDICATION:   Chest pain today    TECHNIQUE:   CT of the thorax without IV contrast. Coronal and sagittal reconstructions were obtained.  Radiation dose reduction techniques included automated exposure control or exposure modulation based on body size. Count of known CT and cardiac nuc med studies  performed in previous 12 months: 1.     COMPARISON:   11/23/2022    FINDINGS:  Is mild stable apical fibrotic change in the lungs are otherwise clear. Aorta is normal in size. There is no adenopathy. Right thyroid lobe is normal. Left thyroid lobe is small. Please see abdomen pelvic CT scan for findings below the diaphragm    There are degenerative changes of the thoracic spine      Impression:      No acute findings    Signer Name: Anthony London MD   Signed: 12/31/2022 2:20 PM   Workstation Name: [facility]    US Gallbladder [[id number]] Collected: 12/31/22 1355     Updated: 12/31/22 1357    Narrative:      US Abdomen Ltd    INDICATION:   Right upper quadrant abdominal pain    COMPARISON:   None available.    FINDINGS:  Gallbladder is adequately distended. Wall is not thickened. No stones are visible. There is sludge present Visualized portions of liver normal. Portal vein shows flow into the liver. Common bile duct is 4 mm  in diameter.      Impression:      Gallbladder sludge is noted otherwise normal gallbladder ultrasound    Signer Name: Anthony London MD   Signed: 12/31/2022 1:55 PM   Workstation Name: [facility]    [facility]    XR Chest 1 View [[id number]] Collected: 12/31/22 1228     Updated: 12/31/22 1230    Narrative:      CR Chest 1 Vw    INDICATION:   Nausea and vomiting today     COMPARISON:    None available.    FINDINGS:  Portable AP view(s) of the chest.  The heart and mediastinal contours are normal. The lungs are clear. No pneumothorax or pleural effusion.      Impression:      No acute cardiopulmonary findings.    Signer Name: Anthony London MD   Signed: 12/31/2022 12:28 PM   Workstation Name: [facility]    [facility]          I have discussed my impression and recommendations with the patient and family.    Thank you very much for asking us to be involved in this patient's care.  We will follow along with you.      Cristobal Marie MD  01/02/23  11:18 EST    Please note that portions of this note were completed with a voice recognition program.

## 2023-01-02 NOTE — PROGRESS NOTES
[facility] HOSPITALIST PROGRESS NOTE     Patient Identification:  Name:  Yamile Angel  Age:  77 y.o.  Sex:  female  :  1945  MRN:  [id number]  Visit Number:  [id number]  ROOM: [address]     Primary Care Provider:  Cristofer Garcia MD    Length of stay in inpatient status:  2    Subjective     Chief Compliant:    Chief Complaint   Patient presents with   • Nausea   • Vomiting     History of Presenting Illness:    Patient remains ill but stable today, no acute events overnight, no new complaints, denies any fevers or chills, heart rate remains borderline tachycardia, patient without significant complaints, denied any current abdominal pain, has been weaned off oxygen now, BMP today pending though WBC count back and improved on antibiotics, unclear source if has any infection, cultures remain negative to date, General Surgery consulted and following, want to rule out ischemic colitis, pending creatinine trend likely order CTA today, VQ negative for Pulmonary Embolism, reviewed echocardiogram results and somewhat concerned for obstructive cardiomyopathy, consulted Cardiology for possible cardiogenic component of prior shock state, could have been intravascularly depleted with left ventricle outflow obstruction from low flow state that was due to diarrhea and on Hydrochlorothiazide at home.  Objective     Current Hospital Meds:  atorvastatin, 40 mg, Oral, Nightly  cefepime, 2 g, Intravenous, Q24H  ferrous sulfate, 325 mg, Oral, BID  gabapentin, 300 mg, Oral, Q8H  heparin (porcine), 5,000 Units, Subcutaneous, Q8H  ipratropium, 0.5 mg, Nebulization, 4x Daily - RT  metroNIDAZOLE, 500 mg, Intravenous, Q8H  oxyCODONE-acetaminophen, 1 tablet, Oral, 4x Daily  polyethylene glycol, 17 g, Oral, Daily  traZODone, 25 mg, Oral, Nightly  sodium chloride, 75 mL/hr, Last Rate: 75 mL/hr (23  1026)      ----------------------------------------------------------------------------------------------------------------------  Vital Signs:  Temp:  [98.2 °F (36.8 °C)-98.6 °F (37 °C)] 98.2 °F (36.8 °C)  Heart Rate:  [] 89  Resp:  [16] 16  BP: ()/() 128/75  SpO2:  [90 %-100 %] 97 %  on  Flow (L/min):  [2] 2;   Device (Oxygen Therapy): room air  Body mass index is 24.14 kg/m².      Intake/Output Summary (Last 24 hours) at 1/2/2023 1028  Last data filed at 1/2/2023 0800  Gross per 24 hour   Intake 1123.64 ml   Output 1500 ml   Net -376.36 ml      ----------------------------------------------------------------------------------------------------------------------  Physical exam:  Constitutional:  Elderly.  No acute distress.      HENT:  Head:  Normocephalic and atraumatic.  Mouth:  Moist mucous membranes.    Eyes:  Conjunctivae and EOM are normal. No scleral icterus.    Neck:  Neck supple.  No JVD present.    Cardiovascular:  borderline tachycardic, regular rhythm and normal heart sounds with no murmur.  Pulmonary/Chest:  No respiratory distress, no wheezes, no crackles, with normal breath sounds and good air movement.  Abdominal:  Soft. No distension and no tenderness.   Musculoskeletal:  No tenderness, and no deformity.  No red or swollen joints anywhere.    Neurological:  Alert and oriented to person, place, and time.  No cranial nerve deficit.    Skin:  Skin is warm and dry. No rash noted. No pallor.   Peripheral vascular:  No clubbing, no cyanosis, no edema.  Psychiatric: Appropriate mood and affect    Edited by: Jean Claude Fuller MD at 1/2/2023 1021  ----------------------------------------------------------------------------------------------------------------------  WBC/HGB/HCT/PLT   9.74/9.8/30.5/209 (01/02 0946)     No results found for: URINECX  Blood Culture   Date Value Ref Range Status   12/31/2022 No growth at 24 hours  Preliminary   12/31/2022 No growth at 24 hours  Preliminary     I  have personally looked at the labs and they are summarized above.  ----------------------------------------------------------------------------------------------------------------------  Detailed radiology reports for the last 24 hours:  CT Abdomen Pelvis Without Contrast    Result Date: 12/31/2022  The entire small bowel is mildly distended with fluid all way to the terminal ileum suggesting an ileus. Colon is not overly distended but there is fluid within the right colon and air in the transverse colon. Otherwise negative Signer Name: Anthony London MD  Signed: 12/31/2022 2:23 PM  Workstation Name: [facility]-  Radiology [facility]    CT Chest Without Contrast Diagnostic    Result Date: 12/31/2022  No acute findings Signer Name: Anthony London MD  Signed: 12/31/2022 2:20 PM  Workstation Name: [facility]  [facility]    NM Lung Ventilation Perfusion    Result Date: 1/1/2023  Low probability ventilation/perfusion scan for pulmonary embolus. Signer Name: Felix Macias MD  Signed: 1/1/2023 3:32 PM  Workstation [facility]: [facility]  [facility]    US Gallbladder    Result Date: 12/31/2022  Gallbladder sludge is noted otherwise normal gallbladder ultrasound Signer Name: Anthony London MD  Signed: 12/31/2022 1:55 PM  Workstation Name: [facility]  [facility]    XR Chest 1 View    Result Date: 1/1/2023  Line tip in the SVC with no pneumothorax identified. Signer Name: Felix Macias MD  Signed: 1/1/2023 2:56 PM  Workstation Name: [email or web address]  [facility]    XR Chest 1 View    Result Date: 12/31/2022  No acute cardiopulmonary findings. Signer Name: Anthony London MD  Signed: 12/31/2022 12:28 PM  Workstation Name: [facility]  [facility] [place]    Assessment & Plan    77F Smoker PMH Arthritis, Hypertension, Hyperlipidemia, COPD, presented with nausea, vomiting, and abdominal pain.     #Acute Small Bowel Ileus -  Improved   #Rule out ischemic colitis   #Undifferentiated Shock with Lactic Acidosis - Resolved   #SIRS criteria met, would meet septic shock though no identified source of infection to this point  - Patient presented with nausea and vomiting, abdominal pain, was hypotensive requiring IVFs and pressors, no identifiable source of infection to this point  - General Surgery consulted and following, recommended rule out ischemic colitis   - Patient has been adequately volume resuscitated but still requiring low dose levophed.   - creatinine continues to improve, pending further improvement will plan for CTA abdomen/pelvis  - Continue Cefepime and flagyl for now  - Status post Sepsis bolus in emergency room, continue mIVFs  - Continue Tylenol as needed for fevers  - Admitted to PCU, monitor on telemetry, monitor for clinical improvement    #Non-Oliguric Acute Kidney Injury, unclear etiology, possibly due to shock state, holding home ACEI/HCTZ  - Unclear baseline but no history of Chronic Kidney Disease, creatinine 1.8 on admission, CT without obstruction; Today 1.2  - Continue mIVFs, Trend Cr and urine output, avoid nephrotoxins, NSAIDs, dehydration and contrast as able.     #Acute hypoxic respiratory failure - Resolved   #Elevated D-dimer  - Tachycardia, elevated D-dimer, hypoxia concerning for Pulmonary Embolism on admission, CT Chest without contrast without parenchymal abnormality, VQ showed low probability of Pulmonary Embolism; Follow up creatinine trend and likely obtain CTA Chest with abdomen/pelvis    #Hypertension/Hyperlipidemia  #Elevated proBNP, concern for HOCM    - Labs showed troponins negative x 2, proBNP 2000  - Echocardiogram showed LVEF 61-65%, mild septal asymmetric hypertrophy, diastolic dysfunction, left ventricle intracavitary gradient 70mmHg  - Cardiology consulted; Recommendations pending   - Continue home Statin  - Home beta blocker, ACEI/Hydrochlorothiazide combo pill held on admission, resume  as indicated   - Home low dose eliquis held on admission, resume as indicated   - Continue to monitor on telemetry, strict I/O's, trend heart rate and blood pressure     #COPD/Emphysema without Acute Exacerbation  - Add Duonebs as needed, Will order COPD rescue kit at time of discharge      #Chronic Pain  - Reviewed PATTIE, continue home regimen    #Tobacco Dependence  - Recommended cessation, nicotine replacement therapy as needed     F: Oral  E: Monitor & Replace PRN  N: No diet orders on file  PPx: SQ  Code Status (Patient has no pulse and is not breathing): CPR (Attempt to Resuscitate)  Medical Interventions (Patient has pulse or is breathing): Full Support     Dispo: Pending workup and clinical improvement    *This patient is considered high risk secondary to undifferentiated shock, hypoxia, Acute Kidney Injury.     Edited by: Jean Claude Fuller MD at 1/2/2023 [address] [place], [place] [address]ist

## 2023-01-03 ENCOUNTER — APPOINTMENT (OUTPATIENT)
Dept: ULTRASOUND IMAGING | Facility: HOSPITAL | Age: 78
DRG: 388 | End: 2023-01-03
Payer: MEDICARE

## 2023-01-03 LAB
ALBUMIN SERPL-MCNC: 2.6 G/DL (ref 3.5–5.2)
ALBUMIN/GLOB SERPL: 0.9 G/DL
ALP SERPL-CCNC: 73 U/L (ref 39–117)
ALT SERPL W P-5'-P-CCNC: 21 U/L (ref 1–33)
ANION GAP SERPL CALCULATED.3IONS-SCNC: 6.8 MMOL/L (ref 5–15)
ANION GAP SERPL CALCULATED.3IONS-SCNC: 9 MMOL/L (ref 5–15)
AST SERPL-CCNC: 46 U/L (ref 1–32)
BILIRUB SERPL-MCNC: 0.3 MG/DL (ref 0–1.2)
BUN SERPL-MCNC: 10 MG/DL (ref 8–23)
BUN SERPL-MCNC: 7 MG/DL (ref 8–23)
BUN/CREAT SERPL: 13.5 (ref 7–25)
BUN/CREAT SERPL: 18.2 (ref 7–25)
CALCIUM SPEC-SCNC: 7.6 MG/DL (ref 8.6–10.5)
CALCIUM SPEC-SCNC: 7.9 MG/DL (ref 8.6–10.5)
CHLORIDE SERPL-SCNC: 103 MMOL/L (ref 98–107)
CHLORIDE SERPL-SCNC: 104 MMOL/L (ref 98–107)
CO2 SERPL-SCNC: 25.2 MMOL/L (ref 22–29)
CO2 SERPL-SCNC: 28 MMOL/L (ref 22–29)
CREAT SERPL-MCNC: 0.52 MG/DL (ref 0.57–1)
CREAT SERPL-MCNC: 0.55 MG/DL (ref 0.57–1)
DEPRECATED RDW RBC AUTO: 49 FL (ref 37–54)
EGFRCR SERPLBLD CKD-EPI 2021: 94.5 ML/MIN/1.73
EGFRCR SERPLBLD CKD-EPI 2021: 95.8 ML/MIN/1.73
ERYTHROCYTE [DISTWIDTH] IN BLOOD BY AUTOMATED COUNT: 13.2 % (ref 12.3–15.4)
GLOBULIN UR ELPH-MCNC: 3 GM/DL
GLUCOSE SERPL-MCNC: 110 MG/DL (ref 65–99)
GLUCOSE SERPL-MCNC: 112 MG/DL (ref 65–99)
HCT VFR BLD AUTO: 29.6 % (ref 34–46.6)
HGB BLD-MCNC: 9.6 G/DL (ref 12–15.9)
MCH RBC QN AUTO: 33 PG (ref 26.6–33)
MCHC RBC AUTO-ENTMCNC: 32.4 G/DL (ref 31.5–35.7)
MCV RBC AUTO: 101.7 FL (ref 79–97)
PLATELET # BLD AUTO: 188 10*3/MM3 (ref 140–450)
PMV BLD AUTO: 9.6 FL (ref 6–12)
POTASSIUM SERPL-SCNC: 3.2 MMOL/L (ref 3.5–5.2)
POTASSIUM SERPL-SCNC: 3.4 MMOL/L (ref 3.5–5.2)
POTASSIUM SERPL-SCNC: 3.8 MMOL/L (ref 3.5–5.2)
PROT SERPL-MCNC: 5.6 G/DL (ref 6–8.5)
RBC # BLD AUTO: 2.91 10*6/MM3 (ref 3.77–5.28)
SODIUM SERPL-SCNC: 136 MMOL/L (ref 136–145)
SODIUM SERPL-SCNC: 140 MMOL/L (ref 136–145)
WBC NRBC COR # BLD: 8.31 10*3/MM3 (ref 3.4–10.8)

## 2023-01-03 PROCEDURE — 93880 EXTRACRANIAL BILAT STUDY: CPT | Performed by: RADIOLOGY

## 2023-01-03 PROCEDURE — 25010000002 HEPARIN (PORCINE) PER 1000 UNITS: Performed by: INTERNAL MEDICINE

## 2023-01-03 PROCEDURE — 93880 EXTRACRANIAL BILAT STUDY: CPT

## 2023-01-03 PROCEDURE — 84132 ASSAY OF SERUM POTASSIUM: CPT | Performed by: INTERNAL MEDICINE

## 2023-01-03 PROCEDURE — 80053 COMPREHEN METABOLIC PANEL: CPT | Performed by: INTERNAL MEDICINE

## 2023-01-03 PROCEDURE — 99232 SBSQ HOSP IP/OBS MODERATE 35: CPT | Performed by: INTERNAL MEDICINE

## 2023-01-03 PROCEDURE — 94761 N-INVAS EAR/PLS OXIMETRY MLT: CPT

## 2023-01-03 PROCEDURE — 94799 UNLISTED PULMONARY SVC/PX: CPT

## 2023-01-03 PROCEDURE — 85027 COMPLETE CBC AUTOMATED: CPT | Performed by: INTERNAL MEDICINE

## 2023-01-03 PROCEDURE — 25010000002 CEFEPIME PER 500 MG: Performed by: INTERNAL MEDICINE

## 2023-01-03 PROCEDURE — 0 POTASSIUM CHLORIDE 10 MEQ/100ML SOLUTION: Performed by: INTERNAL MEDICINE

## 2023-01-03 PROCEDURE — 99232 SBSQ HOSP IP/OBS MODERATE 35: CPT | Performed by: SURGERY

## 2023-01-03 RX ORDER — METOPROLOL TARTRATE 50 MG/1
50 TABLET, FILM COATED ORAL ONCE AS NEEDED
Status: DISCONTINUED | OUTPATIENT
Start: 2023-01-03 | End: 2023-01-05 | Stop reason: HOSPADM

## 2023-01-03 RX ORDER — MAGNESIUM SULFATE HEPTAHYDRATE 40 MG/ML
2 INJECTION, SOLUTION INTRAVENOUS AS NEEDED
Status: DISCONTINUED | OUTPATIENT
Start: 2023-01-03 | End: 2023-01-05 | Stop reason: HOSPADM

## 2023-01-03 RX ORDER — MAGNESIUM SULFATE HEPTAHYDRATE 40 MG/ML
4 INJECTION, SOLUTION INTRAVENOUS AS NEEDED
Status: DISCONTINUED | OUTPATIENT
Start: 2023-01-03 | End: 2023-01-05 | Stop reason: HOSPADM

## 2023-01-03 RX ORDER — SODIUM CHLORIDE 0.9 % (FLUSH) 0.9 %
3 SYRINGE (ML) INJECTION EVERY 12 HOURS SCHEDULED
Status: DISCONTINUED | OUTPATIENT
Start: 2023-01-03 | End: 2023-01-05 | Stop reason: HOSPADM

## 2023-01-03 RX ORDER — METOPROLOL SUCCINATE 25 MG/1
25 TABLET, EXTENDED RELEASE ORAL
Status: DISCONTINUED | OUTPATIENT
Start: 2023-01-03 | End: 2023-01-05

## 2023-01-03 RX ORDER — METOPROLOL TARTRATE 100 MG/1
100 TABLET ORAL ONCE AS NEEDED
Status: DISCONTINUED | OUTPATIENT
Start: 2023-01-03 | End: 2023-01-05 | Stop reason: HOSPADM

## 2023-01-03 RX ORDER — LIDOCAINE HYDROCHLORIDE 10 MG/ML
5 INJECTION, SOLUTION EPIDURAL; INFILTRATION; INTRACAUDAL; PERINEURAL AS NEEDED
Status: DISCONTINUED | OUTPATIENT
Start: 2023-01-03 | End: 2023-01-05 | Stop reason: HOSPADM

## 2023-01-03 RX ORDER — POTASSIUM CHLORIDE 7.45 MG/ML
10 INJECTION INTRAVENOUS
Status: DISCONTINUED | OUTPATIENT
Start: 2023-01-03 | End: 2023-01-05 | Stop reason: HOSPADM

## 2023-01-03 RX ORDER — SODIUM CHLORIDE 0.9 % (FLUSH) 0.9 %
10 SYRINGE (ML) INJECTION AS NEEDED
Status: DISCONTINUED | OUTPATIENT
Start: 2023-01-03 | End: 2023-01-05 | Stop reason: HOSPADM

## 2023-01-03 RX ORDER — POTASSIUM CHLORIDE 7.45 MG/ML
10 INJECTION INTRAVENOUS
Status: COMPLETED | OUTPATIENT
Start: 2023-01-03 | End: 2023-01-03

## 2023-01-03 RX ADMIN — OXYCODONE HYDROCHLORIDE AND ACETAMINOPHEN 1 TABLET: 10; 325 TABLET ORAL at 21:24

## 2023-01-03 RX ADMIN — Medication 10 ML: at 08:21

## 2023-01-03 RX ADMIN — Medication 10 ML: at 21:25

## 2023-01-03 RX ADMIN — METOPROLOL SUCCINATE 25 MG: 25 TABLET, EXTENDED RELEASE ORAL at 12:33

## 2023-01-03 RX ADMIN — Medication 3 ML: at 21:26

## 2023-01-03 RX ADMIN — HEPARIN SODIUM 5000 UNITS: 5000 INJECTION INTRAVENOUS; SUBCUTANEOUS at 21:24

## 2023-01-03 RX ADMIN — POTASSIUM CHLORIDE 10 MEQ: 7.46 INJECTION, SOLUTION INTRAVENOUS at 13:45

## 2023-01-03 RX ADMIN — Medication 3 ML: at 08:22

## 2023-01-03 RX ADMIN — CEFEPIME 2 G: 2 INJECTION, POWDER, FOR SOLUTION INTRAVENOUS at 09:22

## 2023-01-03 RX ADMIN — POTASSIUM CHLORIDE, DEXTROSE MONOHYDRATE AND SODIUM CHLORIDE 75 ML/HR: 150; 5; 450 INJECTION, SOLUTION INTRAVENOUS at 03:41

## 2023-01-03 RX ADMIN — GABAPENTIN 300 MG: 300 CAPSULE ORAL at 21:23

## 2023-01-03 RX ADMIN — IPRATROPIUM BROMIDE 0.5 MG: 0.5 SOLUTION RESPIRATORY (INHALATION) at 06:28

## 2023-01-03 RX ADMIN — POTASSIUM CHLORIDE 10 MEQ: 7.46 INJECTION, SOLUTION INTRAVENOUS at 14:41

## 2023-01-03 RX ADMIN — POTASSIUM CHLORIDE, DEXTROSE MONOHYDRATE AND SODIUM CHLORIDE 75 ML/HR: 150; 5; 450 INJECTION, SOLUTION INTRAVENOUS at 19:09

## 2023-01-03 RX ADMIN — FERROUS SULFATE TAB 325 MG (65 MG ELEMENTAL FE) 325 MG: 325 (65 FE) TAB at 08:21

## 2023-01-03 RX ADMIN — GABAPENTIN 300 MG: 300 CAPSULE ORAL at 05:49

## 2023-01-03 RX ADMIN — OXYCODONE HYDROCHLORIDE AND ACETAMINOPHEN 1 TABLET: 10; 325 TABLET ORAL at 07:49

## 2023-01-03 RX ADMIN — GABAPENTIN 300 MG: 300 CAPSULE ORAL at 13:45

## 2023-01-03 RX ADMIN — METRONIDAZOLE 500 MG: 500 INJECTION, SOLUTION INTRAVENOUS at 05:49

## 2023-01-03 RX ADMIN — POTASSIUM CHLORIDE 10 MEQ: 7.46 INJECTION, SOLUTION INTRAVENOUS at 15:38

## 2023-01-03 RX ADMIN — OXYCODONE HYDROCHLORIDE AND ACETAMINOPHEN 1 TABLET: 10; 325 TABLET ORAL at 11:26

## 2023-01-03 RX ADMIN — Medication 10 ML: at 21:26

## 2023-01-03 RX ADMIN — ATORVASTATIN CALCIUM 40 MG: 40 TABLET, FILM COATED ORAL at 21:23

## 2023-01-03 RX ADMIN — IPRATROPIUM BROMIDE 0.5 MG: 0.5 SOLUTION RESPIRATORY (INHALATION) at 19:46

## 2023-01-03 RX ADMIN — POTASSIUM CHLORIDE 10 MEQ: 7.46 INJECTION, SOLUTION INTRAVENOUS at 12:34

## 2023-01-03 RX ADMIN — IPRATROPIUM BROMIDE 0.5 MG: 0.5 SOLUTION RESPIRATORY (INHALATION) at 12:35

## 2023-01-03 RX ADMIN — TRAZODONE HYDROCHLORIDE 25 MG: 50 TABLET ORAL at 21:23

## 2023-01-03 RX ADMIN — OXYCODONE HYDROCHLORIDE AND ACETAMINOPHEN 1 TABLET: 10; 325 TABLET ORAL at 17:04

## 2023-01-03 RX ADMIN — FERROUS SULFATE TAB 325 MG (65 MG ELEMENTAL FE) 325 MG: 325 (65 FE) TAB at 21:23

## 2023-01-03 RX ADMIN — HEPARIN SODIUM 5000 UNITS: 5000 INJECTION INTRAVENOUS; SUBCUTANEOUS at 05:49

## 2023-01-03 RX ADMIN — METRONIDAZOLE 500 MG: 500 INJECTION, SOLUTION INTRAVENOUS at 13:45

## 2023-01-03 RX ADMIN — METRONIDAZOLE 500 MG: 500 INJECTION, SOLUTION INTRAVENOUS at 21:24

## 2023-01-03 NOTE — CASE MANAGEMENT/SOCIAL WORK
Discharge Planning Assessment   Jose Elias     Patient Name: Yamile Angel  MRN: [id number]  Today's Date: 1/3/2023    Admit Date: 12/31/2022       Discharge Needs Assessment     Row Name 01/03/23 1050       Living Environment    People in Home alone    Current Living Arrangements home    Primary Care Provided by self    Provides Primary Care For no one    Family Caregiver if Needed child(sonal), adult    Quality of Family Relationships unable to assess       Resource/Environmental Concerns    Resource/Environmental Concerns none    Transportation Concerns none       Transition Planning    Patient/Family Anticipates Transition to home    Patient/Family Anticipated Services at Transition --  Does not utilize HH and denies needs.    Transportation Anticipated family or friend will provide       Discharge Needs Assessment    Readmission Within the Last 30 Days no previous admission in last 30 days    Equipment Currently Used at Home nebulizer;cane, quad    Concerns to be Addressed no discharge needs identified;denies needs/concerns at this time               Discharge Plan     Row Name 01/03/23 6993       Plan    Plan CM spoke with patient on this date. She plans to return home at [place]. Her Dtr and sons live close by and assists as needed. She has a Nebulizer from a Pharmacy and a purchased quad cane. She does not utilize HH and denies needs at this time. She gets he meds without copay problems. Her family will provide transport home at [place]. CM will follow and assist as needed.    Patient/Family in Agreement with Plan yes              Continued Care and Services - Admitted Since 12/31/2022    Coordination has not been started for this encounter.       Expected Discharge Date and Time     Expected Discharge Date Expected Discharge Time    Jan 4, 2023          Demographic Summary     Row Name 01/03/23 1052       General Information    Preferred Language English               Functional Status     Row Name 01/03/23 1057        Mental Status Summary    Mental Status Comments Alert and oriented.               Psychosocial    No documentation.                Abuse/Neglect    No documentation.                Legal    No documentation.                Substance Abuse    No documentation.                Patient Forms    No documentation.                   Meghna Leos RN

## 2023-01-03 NOTE — PROGRESS NOTES
LOS: 3 days   Patient Care Team:  Cristofer Garcia MD as PCP - General (Family Medicine)    Surgery follow up for ileus, nausea and vomiting    Subjective     Interval History:  No issues overnight.  Nurse inadvertently put in order for CT abdomen/pelvis with IV contrast instead of CTA.  Extensive vascular calcification noted. Now will need to wait 48 hours to do CTA.  Yesterday patient was started on a mechanical soft low residue diet which she tolerated without any problems.  No nausea or vomiting.  No increased pain.  She has had 2 or 3 bowel movements overnight.    History taken from patient.      Review of Systems:   Review of systems negative except for history of present illness    Objective     Vital Signs  Temp:  [98 °F (36.7 °C)-98.8 °F (37.1 °C)] 98.4 °F (36.9 °C)  Heart Rate:  [] 79  Resp:  [18-19] 19  BP: (104-156)/(55-95) 144/70    Physical Exam:  General:  This is a WD WN female in no acute distress  Vital signs stable, afebrile  HEENT exam:  WNL. Sclera are anicteric.  EOMI  Neck:  supple, FROM.  No JVD.  Trachea midline  Lungs:  Respiratory effort normal. Auscultation: Clear, without wheezes, rhonchi, rales  Heart:  Regular rate and rhythm, without murmur, gallop, rub.  No pedal edema  Abdomen: Bowel sounds are hypoactive.  Abdomen is soft, nontender, nondistended.  No palpable mass.  No rebound or guarding.  Musculoskeletal: Muscle movement is symmetrical  Psyc:  alert, oriented x 3.  Mood and affect are appropriate  skin:  Warm with good turgor.  Without rash or lesion  extremities:  Examination of the extremities revealed no cyanosis, clubbing or edema.     Results Review:    Results from last 7 days   Lab Units 12/31/22  1542 12/31/22  1314   TROPONIN T ng/mL <0.010 <0.010     Results from last 7 days   Lab Units 01/03/23  0021 01/02/23  0946 01/01/23  0659 01/01/23  0049 12/31/22  1542 12/31/22  1314 12/31/22  1241   CRP mg/dL  --   --   --   --   --  18.68*  --    LACTATE  mmol/L  --   --  0.8  --  1.3  --  4.3*   WBC 10*3/mm3 8.31 9.74 14.24*   < >  --   --  18.74*   HEMOGLOBIN g/dL 9.6* 9.8* 10.1*   < >  --   --  13.4   HEMATOCRIT % 29.6* 30.5* 31.6*   < >  --   --  42.6   PLATELETS 10*3/mm3 188 209 285   < >  --   --  420    < > = values in this interval not displayed.     Results from last 7 days   Lab Units 12/31/22  1654   PH, ARTERIAL pH units 7.405   PO2 ART mm Hg 51.7*   PCO2, ARTERIAL mm Hg 41.5   HCO3 ART mmol/L 26.0     Results from last 7 days   Lab Units 01/03/23  0021 01/02/23  0946 01/01/23  0049 12/31/22  1314 12/31/22  1314 12/31/22  1242   SODIUM mmol/L 136 138 136   < > 133*  --    POTASSIUM mmol/L 3.2* 3.1* 3.9   < > 4.2  --    MAGNESIUM mg/dL  --   --   --   --   --  2.4   CHLORIDE mmol/L 104 105 98   < > 89*  --    CO2 mmol/L 25.2 24.5 23.6   < > 24.7  --    BUN mg/dL 10 13 51*   < > 53*  --    CREATININE mg/dL 0.55* 0.51* 1.21*   < > 1.83*  --    CALCIUM mg/dL 7.9* 7.7* 8.1*   < > 9.1  --    GLUCOSE mg/dL 110* 125* 110*   < > 109*  --    ALBUMIN g/dL  --   --  2.8*  --  3.6  --    BILIRUBIN mg/dL  --   --  0.3  --  0.5  --    ALK PHOS U/L  --   --  90  --  116  --    AST (SGOT) U/L  --   --  33*  --  38*  --    ALT (SGPT) U/L  --   --  16  --  19  --     < > = values in this interval not displayed.   Estimated Creatinine Clearance: 95.2 mL/min (A) (by C-G formula based on SCr of 0.55 mg/dL (L)).  No results found for: AMMONIA  Results from last 7 days   Lab Units 12/31/22  1542   CHOLESTEROL mg/dL 109   TRIGLYCERIDES mg/dL 127   HDL CHOL mg/dL 37*   LDL CHOL mg/dL 49     Blood Culture   Date Value Ref Range Status   12/31/2022 No growth at 2 days  Preliminary   12/31/2022 No growth at 2 days  Preliminary     No results found for: URINECX  No results found for: WOUNDCX  No results found for: STOOLCX    Imaging:  Imaging Results (Last 24 Hours)     Procedure Component Value Units Date/Time    CT Abdomen Pelvis With & Without Contrast [[id number]] Collected:  01/02/23 1451     Updated: 01/02/23 1519    Narrative:      EXAM: CT ABDOMEN PELVIS W WO CONTRAST-         TECHNIQUE: Multiple axial CT images were obtained from lung bases  through pubic symphysis WITHOUT AND THEN AFTER THE administration of IV  contrast. Reformatted images in the coronal and/or sagittal plane(s)  were generated from the axial data set to facilitate diagnostic accuracy  and/or surgical planning.  Oral Contrast:NONE.     Radiation dose reduction techniques were utilized per ALARA protocol.  Automated exposure control was initiated through either or [employer] or  [employer] software packages by  protocol.    DOSE:     Clinical information Nausea/vomiting; A41.9-Sepsis, unspecified  organism; R65.20-Severe sepsis without septic shock; N17.9-Acute kidney  failure, unspecified; K56.7-Ileus, unspecified      Comparison 12/31/2022     FINDINGS:     Lower thorax: Trace bibasilar pleural effusions     Abdomen:     Liver: Homogeneous. No focal hepatic mass or ductal dilatation.     Gallbladder: No dilation or stone identified.     Pancreas: Unremarkable. No mass or ductal dilatation.     Spleen: Homogeneous. No splenomegaly.     Adrenals: No mass.     Kidneys/ureters: No mass. No obstructive uropathy.  No evidence of  urolithiasis.     GI tract: Fluid-filled loops of small bowel, but no distention  identified on today's exam.. Right colon is fluid-filled and is slightly  distended.     MESENTERY: No free fluid, walled off fluid collections, mesenteric  stranding, or enlarged lymph nodes        Vasculature: Ectasia of the aorta. Extensive atherosclerotic vascular  disease     Abdominal wall: No focal hernia or mass.        Bladder: Mild thickening of the urinary bladder wall     Reproductive: Enlarged prostate     Bones: Scoliosis and diffuse disc space narrowing       Impression:         1. Interval improvement in appearance of the bowel. Fluid-filled loops  of small bowel but no distention. Right  colon is distended and  fluid-filled but no evidence of obstruction. Moderate volume stool  distally.     2.Mild thickening of the urinary bladder wall     3. Other findings as above                    This report was finalized on 1/2/2023 3:15 PM by Dr. Liam Chinchilla MD.           Impression:  Ileus of unclear etiology.  Clinical concern for intestinal angina    Plan:  Await results of CT angio    Yamile Langston MD  01/03/23  09:19 EST      Please note that portions of this note were completed with a voice recognition program.

## 2023-01-03 NOTE — PLAN OF CARE
Goal Outcome Evaluation:  Plan of Care Reviewed With: patient        Progress: improving  Outcome Evaluation: Pt A/Ox4. VSS. Pt on RA, tolerating well. Fluids infusing at 75 mL/hr. No c/o chest pain or shortness of breath. Pt has had c/o pain in her back, scheduled pain medications provided as ordered. Pt currently resting in bed with no requests at this time. Call light in reach, bed locked and lowest level, bed alarm active. Will continue to monitor.

## 2023-01-03 NOTE — PROGRESS NOTES
[facility] HOSPITALIST PROGRESS NOTE     Patient Identification:  Name:  Yamile nAgel  Age:  77 y.o.  Sex:  female  :  1945  MRN:  [id number]  Visit Number:  [id number]  ROOM: [address]     Primary Care Provider:  Cristofer Garcia MD    Length of stay in inpatient status:  3    Subjective     Chief Compliant:    Chief Complaint   Patient presents with   • Nausea   • Vomiting     History of Presenting Illness:    Patient remains ill but stable today, no acute events overnight, no new complaints, denies any fevers or chills, heart rate and blood pressure improved, WBC count normal, hemoglobin stable, discussed with General Surgery and wanted CTA not CT with contrast and plan for repeat study tomorrow, advancing diet today, NPO @ MN, remains on empiric antibiotics, Cardiology yesterday with less concern for HOCM though provider yesterday wanted opinion from new provider, Dr. Theodore, today on service, CMP pending today, PT/TO evals ordered and pending.   Objective     Current Hospital Meds:  atorvastatin, 40 mg, Oral, Nightly  cefepime, 2 g, Intravenous, Q24H  ferrous sulfate, 325 mg, Oral, BID  gabapentin, 300 mg, Oral, Q8H  heparin (porcine), 5,000 Units, Subcutaneous, Q8H  ipratropium, 0.5 mg, Nebulization, 4x Daily - RT  metroNIDAZOLE, 500 mg, Intravenous, Q8H  oxyCODONE-acetaminophen, 1 tablet, Oral, 4x Daily  polyethylene glycol, 17 g, Oral, Daily  potassium chloride, 10 mEq, Intravenous, Q1H  traZODone, 25 mg, Oral, Nightly  dextrose 5 % and sodium chloride 0.45 % with KCl 20 mEq/L, 75 mL/hr, Last Rate: 75 mL/hr (23 0341)      ----------------------------------------------------------------------------------------------------------------------  Vital Signs:  Temp:  [98 °F (36.7 °C)-98.8 °F (37.1 °C)] 98.4 °F (36.9 °C)  Heart Rate:  [] 84  Resp:  [18-19] 19  BP: (104-156)/() 143/101  SpO2:  [91 %-99 %] 95 %  on   ;   Device (Oxygen Therapy): room air  Body mass  index is 24.31 kg/m².      Intake/Output Summary (Last 24 hours) at 1/3/2023 1049  Last data filed at 1/3/2023 0900  Gross per 24 hour   Intake 2549.96 ml   Output 1100 ml   Net 1449.96 ml      ----------------------------------------------------------------------------------------------------------------------  Physical exam:  Constitutional:  Elderly.  No acute distress.      HENT:  Head:  Normocephalic and atraumatic.  Mouth:  Moist mucous membranes.    Eyes:  Conjunctivae and EOM are normal. No scleral icterus.    Neck:  Neck supple.  No JVD present.    Cardiovascular:  Regular rate, regular rhythm, systolic ejection murmur LSB with radiation to carotids  Pulmonary/Chest:  No respiratory distress, no wheezes, no crackles, on room air  Abdominal:  Soft. No distension and no tenderness.   Musculoskeletal:  No tenderness, and no deformity.  No red or swollen joints anywhere.    Neurological:  Alert and oriented to person, place, and time.  No gross focal deficits   Skin:  Skin is warm and dry. No rash noted. No pallor.   Peripheral vascular:  No clubbing, no cyanosis, no edema.  Psychiatric: Appropriate mood and affect    Edited by: Jean Claude Fuller MD at 1/3/2023 1034  ----------------------------------------------------------------------------------------------------------------------  WBC/HGB/HCT/PLT   8.31/9.6/29.6/188 (01/03 0021)  BUN/CREAT/GLUC/ALT/AST/CAM/LIP    10/0.55/110/--/--/--/-- (01/03 0021)  LYTES - Na/K/Cl/CO2: 136/3.2*/104/25.2 (01/03 0021)     No results found for: URINECX  Blood Culture   Date Value Ref Range Status   12/31/2022 No growth at 2 days  Preliminary   12/31/2022 No growth at 2 days  Preliminary     I have personally looked at the labs and they are summarized above.  ----------------------------------------------------------------------------------------------------------------------  Detailed radiology reports for the last 24 hours:  CT Abdomen Pelvis With & Without Contrast    Result  Date: 1/2/2023   1. Interval improvement in appearance of the bowel. Fluid-filled loops of small bowel but no distention. Right colon is distended and fluid-filled but no evidence of obstruction. Moderate volume stool distally.  2.Mild thickening of the urinary bladder wall  3. Other findings as above       This report was finalized on 1/2/2023 3:15 PM by Dr. Liam Chinchilla MD.      NM Lung Ventilation Perfusion    Result Date: 1/1/2023  Low probability ventilation/perfusion scan for pulmonary embolus. Signer Name: Felix Macias MD  Signed: 1/1/2023 3:32 PM  Workstation Name: [id number]  [facility]    XR Chest 1 View    Result Date: 1/1/2023  Line tip in the SVC with no pneumothorax identified. Signer Name: Felix Macias MD  Signed: 1/1/2023 2:56 PM  Workstation Name: [id number]  [facility]    Assessment & Plan    77F Smoker PMH Arthritis, Hypertension, Hyperlipidemia, COPD, presented with nausea, vomiting, and abdominal pain.     #Acute Small Bowel Ileus - Improved   #Rule out ischemic colitis   #Undifferentiated Shock with Lactic Acidosis - Resolved   #SIRS criteria met, would meet septic shock though no identified source of infection to this point  - Patient presented with nausea and vomiting, abdominal pain, was hypotensive requiring IVFs and pressors, no identifiable source of infection to this point  - General Surgery consulted and following, recommended rule out ischemic colitis, obtained CT with contrast though need CTA which has been ordered for tomorrow to definitively rule out ischemia  - Continue Cefepime and flagyl for empiric coverage, continue mIVFs, off pressors  - Continue Tylenol as needed for fevers  - Admitted to PCU, monitor on telemetry, monitor for clinical improvement     #Acute hypoxic respiratory failure - Resolved   #Elevated D-dimer  - Tachycardia, elevated D-dimer, hypoxia concerning for Pulmonary Embolism on admission, CT Chest  without contrast without parenchymal abnormality, VQ showed low probability of Pulmonary Embolism; Follow up creatinine trend and likely obtain CTA Chest with abdomen/pelvis    #Hypertension/Hyperlipidemia  #Elevated proBNP, concern for HOCM    - Labs showed troponins negative x 2, proBNP 2000  - Echocardiogram showed LVEF 61-65%, mild septal asymmetric hypertrophy, diastolic dysfunction, left ventricle intracavitary gradient 70mmHg  - Cardiology consulted and following, less likely suspect HOCM though MD yesterday wanted 2nd opinion from Dr. Theodore today  - Continue home Statin  - Home beta blocker, ACEI/Hydrochlorothiazide combo pill held on admission, resume as indicated   - Home low dose eliquis held on admission, resume as indicated   - Continue to monitor on telemetry, strict I/O's, trend heart rate and blood pressure     #Electrolyte Abnormalities  - Acute Mild Hypokalemia - potassium down to 3.1, Replacing, on protocol    #COPD/Emphysema without Acute Exacerbation  - Add Duonebs as needed, Will order COPD rescue kit at time of discharge      #Chronic Pain  - Reviewed PATTIE, continue home regimen    #Tobacco Dependence  - Recommended cessation, nicotine replacement therapy as needed     #Non-Oliguric Acute Kidney Injury, unclear etiology, possibly due to shock state, though also on ACEI/Hydrochlorothiazide at home - Resolved   - Unclear baseline but no history of Chronic Kidney Disease, creatinine 1.8 on admission, CT without obstruction, creatinine now normal; Trend Cr and urine output, avoid nephrotoxins, NSAIDs, dehydration and contrast as able.    F: Oral  E: Monitor & Replace PRN  N: Diet: Regular/House Diet, Gastrointestinal Diets; Fiber-Restricted; Texture: Soft to Chew (NDD 3); Soft to Chew: Whole Meat; Fluid Consistency: Thin (IDDSI 0)  NPO Diet NPO Type: Strict NPO, Sips with Meds  PPx: SQH  Code Status (Patient has no pulse and is not breathing): CPR (Attempt to Resuscitate)  Medical Interventions  (Patient has pulse or is breathing): Full Support     Dispo: Pending workup and clinical improvement, PT/OT evals ordered and pending     *This patient is considered high risk secondary to undifferentiated shock, hypoxia, Acute Kidney Injury.     Edited by: Jean Claude Fuller MD at 1/3/2023 1043  [facility]

## 2023-01-03 NOTE — PLAN OF CARE
Problem: Skin Injury Risk Increased  Goal: Skin Health and Integrity  Outcome: Ongoing, Progressing     Problem: Adult Inpatient Plan of Care  Goal: Plan of Care Review  Outcome: Ongoing, Progressing  Goal: Patient-Specific Goal (Individualized)  Outcome: Ongoing, Progressing  Goal: Absence of Hospital-Acquired Illness or Injury  Outcome: Ongoing, Progressing  Intervention: Identify and Manage Fall Risk  Recent Flowsheet Documentation  Taken 1/3/2023 0500 by Cyndi De Leon RN  Safety Promotion/Fall Prevention: safety round/check completed  Taken 1/3/2023 0300 by Cyndi De Leon RN  Safety Promotion/Fall Prevention: safety round/check completed  Taken 1/3/2023 0100 by Cyndi De Leon RN  Safety Promotion/Fall Prevention: safety round/check completed  Taken 1/2/2023 2300 by Cyndi De Leon RN  Safety Promotion/Fall Prevention: safety round/check completed  Taken 1/2/2023 2100 by Cyndi De Leon RN  Safety Promotion/Fall Prevention: safety round/check completed  Taken 1/2/2023 1900 by Rosenbalm, Cyndi, RN  Safety Promotion/Fall Prevention: safety round/check completed  Intervention: Prevent and Manage VTE (Venous Thromboembolism) Risk  Recent Flowsheet Documentation  Taken 1/3/2023 0500 by Cyndi De Leon RN  Activity Management: activity adjusted per tolerance  Taken 1/3/2023 0300 by Cyndi De Leon RN  Activity Management: activity adjusted per tolerance  Taken 1/3/2023 0200 by Cyndi De Leon RN  VTE Prevention/Management: (see MAR) other (see comments)  Taken 1/3/2023 0100 by Cyndi De Leon RN  Activity Management: activity adjusted per tolerance  Taken 1/2/2023 2300 by Cyndi De Leon RN  Activity Management: activity adjusted per tolerance  Taken 1/2/2023 2100 by Cyndi De Leon RN  Activity Management: activity adjusted per tolerance  Taken 1/2/2023 2000 by Cyndi De Leon RN  VTE Prevention/Management: (see MAR) other (see comments)  Taken  1/2/2023 1900 by Cyndi De Leon RN  Activity Management: activity adjusted per tolerance  Goal: Optimal Comfort and Wellbeing  Outcome: Ongoing, Progressing  Intervention: Provide Person-Centered Care  Recent Flowsheet Documentation  Taken 1/3/2023 0200 by Cyndi De Leon RN  Trust Relationship/Rapport: care explained  Taken 1/2/2023 2000 by Cyndi De Leon RN  Trust Relationship/Rapport: care explained  Goal: Readiness for Transition of Care  Outcome: Ongoing, Progressing     Problem: Fall Injury Risk  Goal: Absence of Fall and Fall-Related Injury  Outcome: Ongoing, Progressing  Intervention: Identify and Manage Contributors  Recent Flowsheet Documentation  Taken 1/3/2023 0500 by Cyndi De Leon RN  Medication Review/Management: medications reviewed  Taken 1/3/2023 0300 by Cyndi De Leon RN  Medication Review/Management: medications reviewed  Taken 1/3/2023 0200 by Cyndi De Leon RN  Medication Review/Management: medications reviewed  Taken 1/3/2023 0100 by Cyndi De Leon RN  Medication Review/Management: medications reviewed  Taken 1/2/2023 2300 by Cyndi De Leon RN  Medication Review/Management: medications reviewed  Taken 1/2/2023 2100 by Cyndi De Leon RN  Medication Review/Management: medications reviewed  Taken 1/2/2023 2000 by Cyndi De Leon RN  Medication Review/Management: medications reviewed  Taken 1/2/2023 1900 by Cyndi De Leon RN  Medication Review/Management: medications reviewed  Intervention: Promote Injury-Free Environment  Recent Flowsheet Documentation  Taken 1/3/2023 0500 by Cyndi De Leon RN  Safety Promotion/Fall Prevention: safety round/check completed  Taken 1/3/2023 0300 by Cyndi De Leon RN  Safety Promotion/Fall Prevention: safety round/check completed  Taken 1/3/2023 0100 by Cyndi De Leon RN  Safety Promotion/Fall Prevention: safety round/check completed  Taken 1/2/2023 2300 by Cyndi De Leno  RN  Safety Promotion/Fall Prevention: safety round/check completed  Taken 1/2/2023 2100 by Cyndi De Leon RN  Safety Promotion/Fall Prevention: safety round/check completed  Taken 1/2/2023 1900 by Cyndi De Leon, RN  Safety Promotion/Fall Prevention: safety round/check completed   Goal Outcome Evaluation:

## 2023-01-04 ENCOUNTER — APPOINTMENT (OUTPATIENT)
Dept: CT IMAGING | Facility: HOSPITAL | Age: 78
DRG: 388 | End: 2023-01-04
Payer: MEDICARE

## 2023-01-04 LAB
ALBUMIN SERPL-MCNC: 2.7 G/DL (ref 3.5–5.2)
ALBUMIN/GLOB SERPL: 0.9 G/DL
ALP SERPL-CCNC: 74 U/L (ref 39–117)
ALT SERPL W P-5'-P-CCNC: 28 U/L (ref 1–33)
ANION GAP SERPL CALCULATED.3IONS-SCNC: 11.6 MMOL/L (ref 5–15)
AST SERPL-CCNC: 53 U/L (ref 1–32)
BILIRUB SERPL-MCNC: 0.3 MG/DL (ref 0–1.2)
BUN SERPL-MCNC: 7 MG/DL (ref 8–23)
BUN/CREAT SERPL: 12.7 (ref 7–25)
CALCIUM SPEC-SCNC: 7.9 MG/DL (ref 8.6–10.5)
CHLORIDE SERPL-SCNC: 101 MMOL/L (ref 98–107)
CO2 SERPL-SCNC: 24.4 MMOL/L (ref 22–29)
CREAT SERPL-MCNC: 0.55 MG/DL (ref 0.57–1)
DEPRECATED RDW RBC AUTO: 49.1 FL (ref 37–54)
EGFRCR SERPLBLD CKD-EPI 2021: 94.5 ML/MIN/1.73
ERYTHROCYTE [DISTWIDTH] IN BLOOD BY AUTOMATED COUNT: 13.2 % (ref 12.3–15.4)
GLOBULIN UR ELPH-MCNC: 3 GM/DL
GLUCOSE SERPL-MCNC: 114 MG/DL (ref 65–99)
HCT VFR BLD AUTO: 31.5 % (ref 34–46.6)
HGB BLD-MCNC: 10.3 G/DL (ref 12–15.9)
MCH RBC QN AUTO: 33.4 PG (ref 26.6–33)
MCHC RBC AUTO-ENTMCNC: 32.7 G/DL (ref 31.5–35.7)
MCV RBC AUTO: 102.3 FL (ref 79–97)
PLATELET # BLD AUTO: 178 10*3/MM3 (ref 140–450)
PMV BLD AUTO: 10 FL (ref 6–12)
POTASSIUM SERPL-SCNC: 3.6 MMOL/L (ref 3.5–5.2)
PROT SERPL-MCNC: 5.7 G/DL (ref 6–8.5)
RBC # BLD AUTO: 3.08 10*6/MM3 (ref 3.77–5.28)
SODIUM SERPL-SCNC: 137 MMOL/L (ref 136–145)
WBC NRBC COR # BLD: 7.84 10*3/MM3 (ref 3.4–10.8)

## 2023-01-04 PROCEDURE — 80053 COMPREHEN METABOLIC PANEL: CPT | Performed by: INTERNAL MEDICINE

## 2023-01-04 PROCEDURE — 74174 CTA ABD&PLVS W/CONTRAST: CPT | Performed by: RADIOLOGY

## 2023-01-04 PROCEDURE — 99497 ADVNCD CARE PLAN 30 MIN: CPT | Performed by: INTERNAL MEDICINE

## 2023-01-04 PROCEDURE — 25010000002 CEFEPIME PER 500 MG: Performed by: INTERNAL MEDICINE

## 2023-01-04 PROCEDURE — 97162 PT EVAL MOD COMPLEX 30 MIN: CPT

## 2023-01-04 PROCEDURE — 25010000002 HEPARIN (PORCINE) PER 1000 UNITS: Performed by: INTERNAL MEDICINE

## 2023-01-04 PROCEDURE — 94799 UNLISTED PULMONARY SVC/PX: CPT

## 2023-01-04 PROCEDURE — 97166 OT EVAL MOD COMPLEX 45 MIN: CPT

## 2023-01-04 PROCEDURE — 99232 SBSQ HOSP IP/OBS MODERATE 35: CPT | Performed by: INTERNAL MEDICINE

## 2023-01-04 PROCEDURE — 74174 CTA ABD&PLVS W/CONTRAST: CPT

## 2023-01-04 PROCEDURE — 94761 N-INVAS EAR/PLS OXIMETRY MLT: CPT

## 2023-01-04 PROCEDURE — 99231 SBSQ HOSP IP/OBS SF/LOW 25: CPT | Performed by: INTERNAL MEDICINE

## 2023-01-04 PROCEDURE — 99231 SBSQ HOSP IP/OBS SF/LOW 25: CPT | Performed by: SURGERY

## 2023-01-04 PROCEDURE — 85027 COMPLETE CBC AUTOMATED: CPT | Performed by: INTERNAL MEDICINE

## 2023-01-04 PROCEDURE — 0 IOPAMIDOL PER 1 ML: Performed by: INTERNAL MEDICINE

## 2023-01-04 PROCEDURE — 25010000002 HYALURONIDASE (HUMAN) 150 UNIT/ML SOLUTION 1 ML VIAL: Performed by: INTERNAL MEDICINE

## 2023-01-04 RX ADMIN — OXYCODONE HYDROCHLORIDE AND ACETAMINOPHEN 1 TABLET: 10; 325 TABLET ORAL at 20:20

## 2023-01-04 RX ADMIN — GABAPENTIN 300 MG: 300 CAPSULE ORAL at 13:54

## 2023-01-04 RX ADMIN — Medication 10 ML: at 20:20

## 2023-01-04 RX ADMIN — CEFEPIME 2 G: 2 INJECTION, POWDER, FOR SOLUTION INTRAVENOUS at 09:56

## 2023-01-04 RX ADMIN — METRONIDAZOLE 500 MG: 500 INJECTION, SOLUTION INTRAVENOUS at 13:54

## 2023-01-04 RX ADMIN — OXYCODONE HYDROCHLORIDE AND ACETAMINOPHEN 1 TABLET: 10; 325 TABLET ORAL at 17:57

## 2023-01-04 RX ADMIN — Medication 10 ML: at 09:31

## 2023-01-04 RX ADMIN — FERROUS SULFATE TAB 325 MG (65 MG ELEMENTAL FE) 325 MG: 325 (65 FE) TAB at 20:20

## 2023-01-04 RX ADMIN — POTASSIUM CHLORIDE, DEXTROSE MONOHYDRATE AND SODIUM CHLORIDE 75 ML/HR: 150; 5; 450 INJECTION, SOLUTION INTRAVENOUS at 05:58

## 2023-01-04 RX ADMIN — METRONIDAZOLE 500 MG: 500 INJECTION, SOLUTION INTRAVENOUS at 23:10

## 2023-01-04 RX ADMIN — Medication 10 ML: at 20:21

## 2023-01-04 RX ADMIN — METOPROLOL SUCCINATE 25 MG: 25 TABLET, EXTENDED RELEASE ORAL at 09:21

## 2023-01-04 RX ADMIN — OXYCODONE HYDROCHLORIDE AND ACETAMINOPHEN 1 TABLET: 10; 325 TABLET ORAL at 13:52

## 2023-01-04 RX ADMIN — HEPARIN SODIUM 5000 UNITS: 5000 INJECTION INTRAVENOUS; SUBCUTANEOUS at 05:01

## 2023-01-04 RX ADMIN — POTASSIUM CHLORIDE, DEXTROSE MONOHYDRATE AND SODIUM CHLORIDE 50 ML/HR: 150; 5; 450 INJECTION, SOLUTION INTRAVENOUS at 20:20

## 2023-01-04 RX ADMIN — GABAPENTIN 300 MG: 300 CAPSULE ORAL at 23:09

## 2023-01-04 RX ADMIN — METRONIDAZOLE 500 MG: 500 INJECTION, SOLUTION INTRAVENOUS at 05:02

## 2023-01-04 RX ADMIN — ATORVASTATIN CALCIUM 40 MG: 40 TABLET, FILM COATED ORAL at 20:19

## 2023-01-04 RX ADMIN — FERROUS SULFATE TAB 325 MG (65 MG ELEMENTAL FE) 325 MG: 325 (65 FE) TAB at 09:21

## 2023-01-04 RX ADMIN — HEPARIN SODIUM 5000 UNITS: 5000 INJECTION INTRAVENOUS; SUBCUTANEOUS at 13:54

## 2023-01-04 RX ADMIN — HEPARIN SODIUM 5000 UNITS: 5000 INJECTION INTRAVENOUS; SUBCUTANEOUS at 23:10

## 2023-01-04 RX ADMIN — Medication 3 ML: at 20:22

## 2023-01-04 RX ADMIN — IOPAMIDOL 88 ML: 755 INJECTION, SOLUTION INTRAVENOUS at 09:00

## 2023-01-04 RX ADMIN — HYALURONIDASE (HUMAN RECOMBINANT) 150 UNITS: 150 INJECTION, SOLUTION SUBCUTANEOUS at 13:54

## 2023-01-04 RX ADMIN — OXYCODONE HYDROCHLORIDE AND ACETAMINOPHEN 1 TABLET: 10; 325 TABLET ORAL at 09:21

## 2023-01-04 RX ADMIN — TRAZODONE HYDROCHLORIDE 25 MG: 50 TABLET ORAL at 20:19

## 2023-01-04 RX ADMIN — Medication 3 ML: at 09:31

## 2023-01-04 RX ADMIN — IPRATROPIUM BROMIDE 0.5 MG: 0.5 SOLUTION RESPIRATORY (INHALATION) at 06:22

## 2023-01-04 RX ADMIN — Medication 10 ML: at 20:22

## 2023-01-04 NOTE — PROGRESS NOTES
LOS: 4 days   Patient Care Team:  Cristofer Garcia MD as PCP - General (Family Medicine)    Surgery follow up for ileus, nausea and vomiting    Subjective     Interval History:  No issues overnight.  CTA done this morning showing celiac and SMA narrowing.  Patient has tolerated a regular diet without any abdominal cramping or symptoms that she was having prior to admission.    History taken from patient.      Review of Systems:   Review of systems negative except for history of present illness    Objective     Vital Signs  Temp:  [98.1 °F (36.7 °C)-99.6 °F (37.6 °C)] 98.3 °F (36.8 °C)  Heart Rate:  [69-98] 92  Resp:  [14-22] 14  BP: (123-153)/(61-98) 132/80    Physical Exam:  General:  This is a WD WN female in no acute distress  Vital signs stable, afebrile  HEENT exam:  WNL. Sclera are anicteric.  EOMI  Neck:  supple, FROM.  No JVD.  Trachea midline  Lungs:  Respiratory effort normal. Auscultation: Clear, without wheezes, rhonchi, rales  Heart:  Regular rate and rhythm, without murmur, gallop, rub.  No pedal edema  Abdomen: Bowel sounds are hypoactive.  Abdomen is soft, nontender, nondistended.  No palpable mass.  No rebound or guarding.  Musculoskeletal: Muscle movement is symmetrical  Psyc:  alert, oriented x 3.  Mood and affect are appropriate  skin:  Warm with good turgor.  Without rash or lesion  extremities:  Examination of the extremities revealed no cyanosis, clubbing or edema.     Results Review:    Results from last 7 days   Lab Units 12/31/22  1542 12/31/22  1314   TROPONIN T ng/mL <0.010 <0.010     Results from last 7 days   Lab Units 01/04/23  0001 01/03/23  0021 01/02/23  0946 01/01/23  0659 01/01/23  0049 12/31/22  1542 12/31/22  1314 12/31/22  1241   CRP mg/dL  --   --   --   --   --   --  18.68*  --    LACTATE mmol/L  --   --   --  0.8  --  1.3  --  4.3*   WBC 10*3/mm3 7.84 8.31 9.74 14.24*   < >  --   --  18.74*   HEMOGLOBIN g/dL 10.3* 9.6* 9.8* 10.1*   < >  --   --  13.4   HEMATOCRIT %  31.5* 29.6* 30.5* 31.6*   < >  --   --  42.6   PLATELETS 10*3/mm3 178 188 209 285   < >  --   --  420    < > = values in this interval not displayed.     Results from last 7 days   Lab Units 12/31/22  1654   PH, ARTERIAL pH units 7.405   PO2 ART mm Hg 51.7*   PCO2, ARTERIAL mm Hg 41.5   HCO3 ART mmol/L 26.0     Results from last 7 days   Lab Units 01/04/23  0001 01/03/23  1910 01/03/23  1237 01/03/23  0021 01/02/23  0946 01/01/23  0049 12/31/22  1314 12/31/22  1242   SODIUM mmol/L 137  --  140 136   < > 136   < >  --    POTASSIUM mmol/L 3.6 3.8 3.4* 3.2*   < > 3.9   < >  --    MAGNESIUM mg/dL  --   --   --   --   --   --   --  2.4   CHLORIDE mmol/L 101  --  103 104   < > 98   < >  --    CO2 mmol/L 24.4  --  28.0 25.2   < > 23.6   < >  --    BUN mg/dL 7*  --  7* 10   < > 51*   < >  --    CREATININE mg/dL 0.55*  --  0.52* 0.55*   < > 1.21*   < >  --    CALCIUM mg/dL 7.9*  --  7.6* 7.9*   < > 8.1*   < >  --    GLUCOSE mg/dL 114*  --  112* 110*   < > 110*   < >  --    ALBUMIN g/dL 2.7*  --  2.6*  --   --  2.8*   < >  --    BILIRUBIN mg/dL 0.3  --  0.3  --   --  0.3   < >  --    ALK PHOS U/L 74  --  73  --   --  90   < >  --    AST (SGOT) U/L 53*  --  46*  --   --  33*   < >  --    ALT (SGPT) U/L 28  --  21  --   --  16   < >  --     < > = values in this interval not displayed.   Estimated Creatinine Clearance: 94.3 mL/min (A) (by C-G formula based on SCr of 0.55 mg/dL (L)).  No results found for: AMMONIA  Results from last 7 days   Lab Units 12/31/22  1542   CHOLESTEROL mg/dL 109   TRIGLYCERIDES mg/dL 127   HDL CHOL mg/dL 37*   LDL CHOL mg/dL 49     Blood Culture   Date Value Ref Range Status   12/31/2022 No growth at 2 days  Preliminary   12/31/2022 No growth at 2 days  Preliminary     No results found for: URINECX  No results found for: WOUNDCX  No results found for: STOOLCX    Imaging:  Imaging Results (Last 24 Hours)     Procedure Component Value Units Date/Time    CT Angiogram Abdomen Pelvis [[id number]] Collected:  01/04/23 1006     Updated: 01/04/23 1332    Narrative:      EXAMINATION: CT ANGIOGRAM ABDOMEN PELVIS-      CLINICAL INDICATION: intestinal angina; A41.9-Sepsis, unspecified  organism; R65.20-Severe sepsis without septic shock; N17.9-Acute kidney  failure, unspecified; K56.7-Ileus, unspecified        COMPARISON: Unenhanced study 01/02/2023.     Radiation dose reduction techniques were utilized per ALARA protocol.  Automated exposure control was initiated through either or [employer] or  Process Relations software packages by  protocol.           PROCEDURE: Axial images were acquired from the lung bases through the  pubic symphysis during the rapid infusion of IV contrast.     Additional 3-D reformatted images obtained via post-processing for  aerated diagnostic accuracy and procedural planning.     FINDINGS:  Lung bases: Trace bibasilar effusions.     Liver: Homogeneous. No intrahepatic ductal dilatation or focal hepatic  mass.     Spleen:  Homogeneous.     No adrenal enlargement.     Kidneys show no solid mass or hydronephrosis.     Bowel show fluid-filled loops of small bowel without distention.     Evidence of atherosclerotic vascular disease.     Abdominal aorta shows no evidence of aneurysmal dilatation.     Multifocal plaque is present.     Narrowing at the origin of the celiac artery but no evidence of complete  occlusion.     Mild narrowing at the origin of the superior mesenteric artery but no  occlusion.     No extravasation of contrast.       Impression:      1. Narrowing at the origin of both the celiac and superior mesenteric  arteries.  2. Fluid-filled loops of small bowel without small bowel wall thickening  or stranding.  3. No free fluid.  4. Other findings as above.     This report was finalized on 1/4/2023 1:30 PM by Dr. Liam Chinchilla MD.       US Carotid Bilateral [[id number]] Collected: 01/03/23 1616     Updated: 01/03/23 1628    Narrative:      EXAMINATION: US CAROTID BILATERAL-      Technique:  Multiple real-time color Doppler images were acquired of  bilateral carotid arteries.     Stenosis measurements if obtained, were performed by the NASCET or  similar method.        CLINICAL INDICATION:     Bruit bilateral, versus transmitted cardiac  murmur; A41.9-Sepsis, unspecified organism; R65.20-Severe sepsis without  septic shock; N17.9-Acute kidney failure, unspecified; K56.7-Ileus,  unspecified     COMPARISON:    None     FINDINGS:        RIGHT:  No significant intimal thickening or plaque is noted. No occlusion.     RIGHT ICA PSV: 144 cm/s  RIGHT ICA EDV: 3.43 cm/s.   Right ICA/CCA Ratio: 1.3  Anterograde flow is demonstrated in RIGHT vertebral artery.     LEFT:  No focal stenosis or occlusion     LEFT ICA PSV: 150 cm/s  LEFT EDV: 11.7 cm/s.   Left ICA/CCA Ratio: 1.0  Retrograde flow is demonstrated in LEFT vertebral artery.          Impression:      Impression:     1. Retrograde flow in the left vertebral artery.     2. No evidence of hemodynamically significant plaques or stenosis within  the carotid system at this time.     This report was finalized on 1/3/2023 4:26 PM by Dr. Liam Chinchilla MD.           Impression:  Probable intestinal angina    Plan:  Restart oral anticoagulation  Outpatient follow up with vascular surgery  Would recommend a low residue diet with more frequent meals to avoid major stress on the GI tract.    Very challenging situation and that it is hard to know at what point to intervene surgically except to know that if you wait too long and there is 100% occlusion the likelihood of survivability is extremely low.    Yamile Langston MD  01/04/23  14:18 EST      Please note that portions of this note were completed with a voice recognition program.

## 2023-01-04 NOTE — PLAN OF CARE
Goal Outcome Evaluation:              Outcome Evaluation: Rested well so far this shift. v/s stable. Up to bsc to void . kept NPO  after midnight

## 2023-01-04 NOTE — THERAPY EVALUATION
Acute Care - Physical Therapy Initial Evaluation   Jose Elias     Patient Name: Yamile Angel  : 1945  MRN: [id number]  Today's Date: 2023   Onset of Illness/Injury or Date of Surgery: 23  Visit Dx:     ICD-10-CM ICD-9-CM   1. Sepsis with acute renal failure without septic shock, due to unspecified organism, unspecified acute renal failure type (HCC)  A41.9 038.9    R65.20 995.92    N17.9 584.9   2. Ileus (HCC)  K56.7 560.1     Patient Active Problem List   Diagnosis   • Preoperative clearance   • Encounter for screening for malignant neoplasm of colon   • Sepsis with acute renal failure without septic shock, due to unspecified organism, unspecified acute renal failure type (HCC)     Past Medical History:   Diagnosis Date   • Arthritis    • COPD (chronic obstructive pulmonary disease) (HCC)    • Elevated cholesterol    • Hyperlipidemia    • Hypertension    • Sepsis with acute renal failure without septic shock, due to unspecified organism, unspecified acute renal failure type (HCC) 2022     Past Surgical History:   Procedure Laterality Date   • APPENDECTOMY     • COLONOSCOPY     • COLONOSCOPY N/A 12/15/2021    Procedure: COLONOSCOPY FOR SCREENING;  Surgeon: Rhonda Parada MD;  Location: [facility];  Service: Gastroenterology;  Laterality: N/A;   • EYE SURGERY     • HYSTERECTOMY     • SHOULDER SURGERY     • TUBAL ABDOMINAL LIGATION     • UPPER GASTROINTESTINAL ENDOSCOPY     • WRIST SURGERY       PT Assessment (last 12 hours)     PT Evaluation and Treatment     Row Name 23 1540          Physical Therapy Time and Intention    Subjective Information complains of;weakness;fatigue  -CT     Document Type evaluation  -CT     Mode of Treatment physical therapy  -CT     Patient Effort good  -CT     Symptoms Noted During/After Treatment fatigue  -CT     Comment Pt reports she lives alone and is independent PLOF. Pt reports her ex- can provide assist at d/c. Pt is CGA for  mobility at time of eval. Anticipated d/c home with assist.  -CT     Row Name 01/04/23 1540          General Information    Patient Profile Reviewed yes  -CT     Onset of Illness/Injury or Date of Surgery 12/31/23  -CT     Patient Observations alert;cooperative;agree to therapy  -CT     Prior Level of Function independent:;all household mobility;community mobility  -CT     Equipment Currently Used at Home none  -CT     Existing Precautions/Restrictions fall  -CT     Limitations/Impairments safety/cognitive  -CT     Risks Reviewed patient:  -CT     Benefits Reviewed patient:  -CT     Barriers to Rehab medically complex  -CT     Row Name 01/04/23 1540          Living Environment    Current Living Arrangements home  -CT     People in Home alone  -CT     Row Name 01/04/23 1540          Cognition    Affect/Mental Status (Cognition) WFL  -CT     Orientation Status (Cognition) oriented x 4  -CT     Follows Commands (Cognition) WFL  -CT     Row Name 01/04/23 1540          Range of Motion Comprehensive    Comment, General Range of Motion BLE grossly WFL  -CT     Row Name 01/04/23 1540          Strength Comprehensive (MMT)    Comment, General Manual Muscle Testing (MMT) Assessment BLE grossly 4/5  -CT     Row Name 01/04/23 1540          Bed Mobility    Bed Mobility bed mobility (all) activities  -CT     All Activities, Rains (Bed Mobility) contact guard  -CT     Bed Mobility, Safety Issues decreased use of arms for pushing/pulling;decreased use of legs for bridging/pushing  -CT     Assistive Device (Bed Mobility) bed rails  -CT     Row Name 01/04/23 1540          Transfers    Transfers sit-stand transfer;stand-sit transfer  -CT     Row Name 01/04/23 1540          Sit-Stand Transfer    Sit-Stand Rains (Transfers) contact guard  -CT     Row Name 01/04/23 1540          Stand-Sit Transfer    Stand-Sit Rains (Transfers) contact guard  -CT     Row Name 01/04/23 1540          Gait/Stairs (Locomotion)     Manor Level (Gait) contact guard  -CT     Assistive Device (Gait) --  no AD but would recommend use of RW  -CT     Distance in Feet (Gait) 200  -CT     Pattern (Gait) swing-through  -CT     Deviations/Abnormal Patterns (Gait) gait speed decreased;weight shifting decreased  -CT     Bilateral Gait Deviations forward flexed posture  -CT     Row Name 01/04/23 1540          Balance    Balance Assessment sitting static balance;sitting dynamic balance;standing static balance;standing dynamic balance  -CT     Static Sitting Balance modified independence  -CT     Dynamic Sitting Balance contact guard  -CT     Position, Sitting Balance sitting edge of bed  -CT     Static Standing Balance contact guard  -CT     Dynamic Standing Balance minimal assist  -CT     Row Name             Wound 01/04/23 0933 Left antecubital Extravasation    Wound - Properties Group Placement Date: 01/04/23  -BB Placement Time: 0933 -BB Side: Left  -BB Location: antecubital  -BB Primary Wound Type: Extravasatio  -BB    Retired Wound - Properties Group Placement Date: 01/04/23  -BB Placement Time: 0933 -BB Side: Left  -BB Location: antecubital  -BB Primary Wound Type: Extravasatio  -BB    Retired Wound - Properties Group Date first assessed: 01/04/23  -BB Time first assessed: 0933 -BB Side: Left  -BB Location: antecubital  -BB Primary Wound Type: Extravasatio  -BB    Row Name 01/04/23 1540          Coping    Observed Emotional State calm;cooperative  -CT     Verbalized Emotional State acceptance  -CT     Trust Relationship/Rapport care explained  -CT     Row Name 01/04/23 1547          Plan of Care Review    Plan of Care Reviewed With patient  -CT     Row Name 01/04/23 1548          Positioning and Restraints    Pre-Treatment Position in bed  -CT     Post Treatment Position chair  -CT     In Chair sitting;call light within reach;encouraged to call for assist  -CT     Row Name 01/04/23 1543          Therapy Assessment/Plan (PT)     Patient/Family Therapy Goals Statement (PT) Pt goals are to go home  -CT     Functional Level at Time of Evaluation (PT) CGA  -CT     PT Diagnosis (PT) decresed functional mobility  -CT     Rehab Potential (PT) good, to achieve stated therapy goals  -CT     Criteria for Skilled Interventions Met (PT) yes;skilled treatment is necessary  -CT     Therapy Frequency (PT) 2 times/wk  2-5 times/wk  -CT     Predicted Duration of Therapy Intervention (PT) length of stay  -CT     Row Name 01/04/23 1540          Therapy Plan Review/Discharge Plan (PT)    Therapy Plan Review (PT) evaluation/treatment results reviewed;care plan/treatment goals reviewed;risks/benefits reviewed;current/potential barriers reviewed;participants voiced agreement with care plan;participants included;patient  -CT     Row Name 01/04/23 6499          Physical Therapy Goals    Bed Mobility Goal Selection (PT) bed mobility, PT goal 1  -CT     Transfer Goal Selection (PT) transfer, PT goal 1  -CT     Gait Training Goal Selection (PT) gait training, PT goal 1  -CT     Row Name 01/04/23 1946          Bed Mobility Goal 1 (PT)    Activity/Assistive Device (Bed Mobility Goal 1, PT) bed mobility activities, all  -CT     Rawson Level/Cues Needed (Bed Mobility Goal 1, PT) independent;modified independence  -CT     Time Frame (Bed Mobility Goal 1, PT) by discharge  -CT     Row Name 01/04/23 9840          Transfer Goal 1 (PT)    Activity/Assistive Device (Transfer Goal 1, PT) sit-to-stand/stand-to-sit;bed-to-chair/chair-to-bed  -CT     Rawson Level/Cues Needed (Transfer Goal 1, PT) independent;modified independence  -CT     Time Frame (Transfer Goal 1, PT) by discharge  -CT     Row Name 01/04/23 2652          Gait Training Goal 1 (PT)    Activity/Assistive Device (Gait Training Goal 1, PT) gait (walking locomotion);assistive device use  -CT     Rawson Level (Gait Training Goal 1, PT) independent;modified independence  -CT     Distance (Gait Training  Goal 1, PT) 300  -CT     Time Frame (Gait Training Goal 1, PT) by discharge  -CT           User Key  (r) = Recorded By, (t) = Taken By, (c) = Cosigned By    Initials Name Provider Type    CT Twila Thomas PT Physical Therapist    Clarisse Pa, RN Registered Nurse                  PT Recommendation and Plan  Anticipated Discharge Disposition (PT): home with assist  Planned Therapy Interventions (PT): balance training, bed mobility training, gait training, home exercise program, manual therapy techniques, motor coordination training, neuromuscular re-education, postural re-education, patient/family education, strengthening, transfer training  Therapy Frequency (PT): 2 times/wk (2-5 times/wk)  Plan of Care Reviewed With: patient       Time Calculation:    PT Charges     Row Name 01/04/23 1607             Time Calculation    PT Received On 01/04/23  -CT      PT Goal Re-Cert Due Date 01/18/23  -CT            User Key  (r) = Recorded By, (t) = Taken By, (c) = Cosigned By    Initials Name Provider Type    CT Twila Thomas PT Physical Therapist              Therapy Charges for Today     Code Description Service Date Service Provider Modifiers Qty    [id number]  PT EVAL MOD COMPLEXITY 4 1/4/2023 Twila Thomas, PT GP 1               Twila Thomas PT  1/4/2023

## 2023-01-04 NOTE — NURSING NOTE
IV in LAC infiltrated in CT with 88ml Contrast during CTA. Secondary IV placed and patient was still able to receive CTA. Site edematous with no discoloration when returning to floor.  notified and assessed site on rounds. He said to go ahead with protocol orders and give antidote even though it infiltrated more intravenously than subcutaneously.

## 2023-01-04 NOTE — PROGRESS NOTES
LOS: 4 days     Name: Yamile Angel  Age/Sex: 77 y.o. female  :  1945        PCP: Cristofer Garcia MD  REF: No Known Provider    Principal Problem:    Sepsis with acute renal failure without septic shock, due to unspecified organism, unspecified acute renal failure type (HCC)      Reason for follow-up: Hypertrophic obstructive cardiomyopathy noted on ECHO Doppler study.    Subjective   HPI:  Subjective   Ms. Angel is a 77-year-old  female with history of COPD, dyslipidemia, systemic hypertension, was admitted to [facility]) on  for complaints of nausea, vomiting and abdominal pains for the last 4 weeks.  She has been having abdominal tenderness and low-grade fevers the last couple of days prior to admission.  Since admission, she was noted to have evidence of hypertrophic obstructive cardiomyopathy with intracavitary gradient in the left ventricle of about 70 mmHg with an EF of 61-65% which apparently is an incidental finding and Cardiology has been consulted for further evaluation an management.     Interval History: Patient was lying in bed resting quietly. No acute distress noted at this time. She denies shortness of breath, chest pain or palpitations.  Her oxygen saturation is 96% on RA at this time.    Vital Signs  Temp:  [98.1 °F (36.7 °C)-99.6 °F (37.6 °C)] 99.6 °F (37.6 °C)  Heart Rate:  [69-98] 98  Resp:  [14-24] 14  BP: (113-153)/() 138/73     Vital Signs (last 72 hrs)        0700  / 0659  0700   0659  0700   0659  0700   0853   Most Recent      Temp (°F) 98.2 -  98.6    98 -  98.8    98.1 -  99.6       99.6 (37.6)  0400    Heart Rate 75 -  115    64 -  112    69 -  98       98  0622    Resp 16 -  17    18 -  19    14 -  24       14  0622    BP 91/54 -  169/156    104/57 -  156/95    113/74 -  153/70       138/73  0600    SpO2 (%) 91 -  100    90 -  99    81 -  98       95 622         Body mass index is 24.07 kg/m².    Intake/Output Summary (Last 24 hours) at 1/4/2023 0853  Last data filed at 1/4/2023 0400  Gross per 24 hour   Intake 1755 ml   Output 800 ml   Net 955 ml     Objective  Objective     Have seen and examined Ms. Angel today.    Physical Exam:      General Appearance:    Alert, cooperative, in no acute distress.   Head:    Normocephalic, without obvious abnormality, atraumatic.   Eyes:                          Conjunctivae and sclerae normal, no icterus, no pallor, corneas clear.   Neck:   No adenopathy, supple, trachea midline, no thyromegaly, no carotid bruit, no JVD.   Lungs:     Clear to auscultation, respirations regular, even and             unlabored.    Heart:    Regular rhythm and normal rate, normal S1 and S2, + sysolic murmur grade 3/6 that radiates up to her right carotid , no gallop, no rub, no click   Chest Wall:    No abnormalities observed.   Abdomen:     Normal bowel sounds, no masses, no organomegaly, soft      non-tender, non-distended, no guarding, no rebound                tenderness.   Extremities:   Moves all extremities well, no edema, no cyanosis, no            redness.   Pulses:   Pulses palpable and equal bilaterally.   Skin:   No bleeding, bruising or rash.   Neurologic:   Alert and Oriented x 3, Speech Clear & comprehensive.       Results review   Results Review:   Results from last 7 days   Lab Units 01/04/23  0001 01/03/23  0021 01/02/23  0946 01/01/23  0659 01/01/23  0049 12/31/22  1241   WBC 10*3/mm3 7.84 8.31 9.74 14.24* 14.56* 18.74*   HEMOGLOBIN g/dL 10.3* 9.6* 9.8* 10.1* 10.3* 13.4   PLATELETS 10*3/mm3 178 188 209 285 269 420     Results from last 7 days   Lab Units 01/04/23  0001 01/03/23  1910 01/03/23  1237 01/03/23  0021 01/02/23  0946 01/01/23  0049 12/31/22  1314   SODIUM mmol/L 137  --  140 136 138 136 133*   POTASSIUM mmol/L 3.6 3.8 3.4* 3.2* 3.1* 3.9 4.2   CHLORIDE mmol/L 101  --  103 104 105 98 89*   CO2 mmol/L 24.4  --  28.0 25.2  24.5 23.6 24.7   BUN mg/dL 7*  --  7* 10 13 51* 53*   CREATININE mg/dL 0.55*  --  0.52* 0.55* 0.51* 1.21* 1.83*   CALCIUM mg/dL 7.9*  --  7.6* 7.9* 7.7* 8.1* 9.1   GLUCOSE mg/dL 114*  --  112* 110* 125* 110* 109*   ALT (SGPT) U/L 28  --  21  --   --  16 19   AST (SGOT) U/L 53*  --  46*  --   --  33* 38*     Results from last 7 days   Lab Units 12/31/22  1542 12/31/22  1314   TROPONIN T ng/mL <0.010 <0.010     Lab Results   Component Value Date    PROBNP 2,108.0 (H) 12/31/2022     No results found for: INR  Lab Results   Component Value Date    MG 2.4 12/31/2022     Lab Results   Component Value Date    TSH 0.286 01/01/2023    TRIG 127 12/31/2022    HDL 37 (L) 12/31/2022    LDL 49 12/31/2022      Imaging Results (Last 48 Hours)     Procedure Component Value Units Date/Time    CT Angiogram Abdomen Pelvis [[id number]] Resulted: 01/04/23 0829     Updated: 01/04/23 0829    US Carotid Bilateral [[id number]] Collected: 01/03/23 1616     Updated: 01/03/23 1628    Narrative:      EXAMINATION: US CAROTID BILATERAL-      Technique: Multiple real-time color Doppler images were acquired of  bilateral carotid arteries.     Stenosis measurements if obtained, were performed by the NASCET or  similar method.        CLINICAL INDICATION:     Bruit bilateral, versus transmitted cardiac  murmur; A41.9-Sepsis, unspecified organism; R65.20-Severe sepsis without  septic shock; N17.9-Acute kidney failure, unspecified; K56.7-Ileus,  unspecified     COMPARISON:    None     FINDINGS:        RIGHT:  No significant intimal thickening or plaque is noted. No occlusion.     RIGHT ICA PSV: 144 cm/s  RIGHT ICA EDV: 3.43 cm/s.   Right ICA/CCA Ratio: 1.3  Anterograde flow is demonstrated in RIGHT vertebral artery.     LEFT:  No focal stenosis or occlusion     LEFT ICA PSV: 150 cm/s  LEFT EDV: 11.7 cm/s.   Left ICA/CCA Ratio: 1.0  Retrograde flow is demonstrated in LEFT vertebral artery.          Impression:      Impression:     1. Retrograde flow in the  left vertebral artery.     2. No evidence of hemodynamically significant plaques or stenosis within  the carotid system at this time.     This report was finalized on 1/3/2023 4:26 PM by Dr. Liam Chinchilla MD.       CT Abdomen Pelvis With & Without Contrast [[id number]] Collected: 01/02/23 1451     Updated: 01/02/23 1519    Narrative:      EXAM: CT ABDOMEN PELVIS W WO CONTRAST-         TECHNIQUE: Multiple axial CT images were obtained from lung bases  through pubic symphysis WITHOUT AND THEN AFTER THE administration of IV  contrast. Reformatted images in the coronal and/or sagittal plane(s)  were generated from the axial data set to facilitate diagnostic accuracy  and/or surgical planning.  Oral Contrast:NONE.     Radiation dose reduction techniques were utilized per ALARA protocol.  Automated exposure control was initiated through either or [employer] or  D[employer] software packages by  protocol.    DOSE:     Clinical information Nausea/vomiting; A41.9-Sepsis, unspecified  organism; R65.20-Severe sepsis without septic shock; N17.9-Acute kidney  failure, unspecified; K56.7-Ileus, unspecified      Comparison 12/31/2022     FINDINGS:     Lower thorax: Trace bibasilar pleural effusions     Abdomen:     Liver: Homogeneous. No focal hepatic mass or ductal dilatation.     Gallbladder: No dilation or stone identified.     Pancreas: Unremarkable. No mass or ductal dilatation.     Spleen: Homogeneous. No splenomegaly.     Adrenals: No mass.     Kidneys/ureters: No mass. No obstructive uropathy.  No evidence of  urolithiasis.     GI tract: Fluid-filled loops of small bowel, but no distention  identified on today's exam.. Right colon is fluid-filled and is slightly  distended.     MESENTERY: No free fluid, walled off fluid collections, mesenteric  stranding, or enlarged lymph nodes        Vasculature: Ectasia of the aorta. Extensive atherosclerotic vascular  disease     Abdominal wall: No focal hernia or mass.         Bladder: Mild thickening of the urinary bladder wall     Reproductive: Enlarged prostate     Bones: Scoliosis and diffuse disc space narrowing       Impression:         1. Interval improvement in appearance of the bowel. Fluid-filled loops  of small bowel but no distention. Right colon is distended and  fluid-filled but no evidence of obstruction. Moderate volume stool  distally.     2.Mild thickening of the urinary bladder wall     3. Other findings as above                    This report was finalized on 1/2/2023 3:15 PM by Dr. Liam Chinchilla MD.           ECHO:  Results for orders placed during the hospital encounter of 12/31/22    Adult Transthoracic Echo Complete W/ Cont if Necessary Per Protocol    Interpretation Summary  •  Left ventricular ejection fraction appears to be 61 - 65%.  •  Left ventricular wall thickness is consistent with mild septal asymmetric hypertrophy.  •  Left ventricular diastolic function is consistent with (grade I) impaired relaxation.  •  Left ventricular intracavitary gradient noted to be 70 mmHg.  This may represent dynamic  proximal ventricular obstruction.  Clinical correlation suggested.    Telemetry: SR 70's     I reviewed the patient's new clinical results.    Medication Review:   atorvastatin, 40 mg, Oral, Nightly  cefepime, 2 g, Intravenous, Q24H  ferrous sulfate, 325 mg, Oral, BID  gabapentin, 300 mg, Oral, Q8H  heparin (porcine), 5,000 Units, Subcutaneous, Q8H  ipratropium, 0.5 mg, Nebulization, 4x Daily - RT  metoprolol succinate XL, 25 mg, Oral, Q24H  metroNIDAZOLE, 500 mg, Intravenous, Q8H  oxyCODONE-acetaminophen, 1 tablet, Oral, 4x Daily  polyethylene glycol, 17 g, Oral, Daily  sodium chloride, 10 mL, Intravenous, Q12H  sodium chloride, 10 mL, Intravenous, Q12H  sodium chloride, 10 mL, Intravenous, Q12H  sodium chloride, 10 mL, Intravenous, Q12H  sodium chloride, 3 mL, Intravenous, Q12H  traZODone, 25 mg, Oral, Nightly      dextrose 5 % and sodium chloride 0.45 % with  KCl 20 mEq/L, 75 mL/hr, Last Rate: 75 mL/hr (01/04/23 0558)      Assessment      1. Hypertrophic obstructive cardiomyopathy with LVOT gradient of 70 mmHg, patient asymptomatic and clinically stable.  2. Longstanding systemic hypertension, blood pressure seems to be better controlled now.  3. No known coronary artery disease or anginal symptoms.    Plan     1. Increase metoprolol succinate to 50 mg daily   2. since her cardiac status appears to be relatively stable at this time, we will see her as needed.  3. Follow-up in our office in 3 to 4 weeks.    I discussed the patients findings and my recommendations with patient and family    Electronically signed by TOMAS Navarrete, 01/04/23, 12:44 PM EST.    Electronically signed by Chicho Sanon MD, 01/04/23, 2:55 PM EST.          Please note that portions of this note were completed with a voice recognition program.    Please note that portions of this note were copied and has been reviewed and is accurate as of date.

## 2023-01-04 NOTE — THERAPY EVALUATION
Patient Name: Yamile Angel  : 1945    MRN: [id number]                              Today's Date: 2023       Admit Date: 2022    Visit Dx:     ICD-10-CM ICD-9-CM   1. Sepsis with acute renal failure without septic shock, due to unspecified organism, unspecified acute renal failure type (HCC)  A41.9 038.9    R65.20 995.92    N17.9 584.9   2. Ileus (HCC)  K56.7 560.1     Patient Active Problem List   Diagnosis   • Preoperative clearance   • Encounter for screening for malignant neoplasm of colon   • Sepsis with acute renal failure without septic shock, due to unspecified organism, unspecified acute renal failure type (HCC)     Past Medical History:   Diagnosis Date   • Arthritis    • COPD (chronic obstructive pulmonary disease) (HCC)    • Elevated cholesterol    • Hyperlipidemia    • Hypertension    • Sepsis with acute renal failure without septic shock, due to unspecified organism, unspecified acute renal failure type (HCC) 2022     Past Surgical History:   Procedure Laterality Date   • APPENDECTOMY     • COLONOSCOPY     • COLONOSCOPY N/A 12/15/2021    Procedure: COLONOSCOPY FOR SCREENING;  Surgeon: Rhonda Pardaa MD;  [place] [facility]  Service: Gastroenterology;  Laterality: N/A;   • EYE SURGERY     • HYSTERECTOMY     • SHOULDER SURGERY     • TUBAL ABDOMINAL LIGATION     • UPPER GASTROINTESTINAL ENDOSCOPY     • WRIST SURGERY        General Information     Row Name 23 1123          OT Time and Intention    Document Type evaluation  -LM     Mode of Treatment occupational therapy  -LM     Row Name 23 1123          General Information    Patient Profile Reviewed yes  -LM     Prior Level of Function independent:;ADL's;all household mobility;community mobility;transfer  -LM     Existing Precautions/Restrictions fall  -LM     Barriers to Rehab medically complex  -LM     Row Name 23 1123          Living Environment    People in Home alone  -LM     Row Name  01/04/23 1123          Cognition    Orientation Status (Cognition) oriented x 4  -     Row Name 01/04/23 1123          Safety Issues, Functional Mobility    Impairments Affecting Function (Mobility) balance;endurance/activity tolerance;strength  -           User Key  (r) = Recorded By, (t) = Taken By, (c) = Cosigned By    Initials Name Provider Type    LM Prabha Quevedo, OT Occupational Therapist                 Mobility/ADL's     Row Name 01/04/23 1124          Transfers    Transfers toilet transfer  -     Comment, (Transfers) cga  -     Row Name 01/04/23 1124          Toilet Transfer    [place] Level (Toilet Transfer) contact guard  -     Assistive Device (Toilet Transfer) grab bars/safety frame  -     Row Name 01/04/23 1124          Activities of Daily Living    BADL Assessment/Intervention bathing;upper body dressing;lower body dressing;grooming;feeding;toileting  -     Row Name 01/04/23 1124          Bathing Assessment/Intervention    [place] Level (Bathing) contact guard assist;supervision  -     Row Name 01/04/23 1124          Upper Body Dressing Assessment/Training    [place] Level (Upper Body Dressing) set up  -     Row Name 01/04/23 1124          Lower Body Dressing Assessment/Training    [place] Level (Lower Body Dressing) contact guard assist;supervision  -     Row Name 01/04/23 1124          Grooming Assessment/Training    [place] Level (Grooming) set up  -     Row Name 01/04/23 1124          Self-Feeding Assessment/Training    [place] Level (Feeding) set up  -     Row Name 01/04/23 1124          Toileting Assessment/Training    [place] Level (Toileting) supervision;contact guard assist  -           User Key  (r) = Recorded By, (t) = Taken By, (c) = Cosigned By    Initials Name Provider Type    LM Prabha Quevedo, OT Occupational Therapist               Obj/Interventions     Row Name 01/04/23 1125          Sensory Assessment (Somatosensory)     Sensory Assessment (Somatosensory) sensation intact  -LM     Row Name 01/04/23 1125          Vision Assessment/Intervention    Visual Impairment/Limitations WFL  -LM     Row Name 01/04/23 1125          Range of Motion Comprehensive    General Range of Motion no range of motion deficits identified  -LM     Row Name 01/04/23 1125          Strength Comprehensive (MMT)    General Manual Muscle Testing (MMT) Assessment no strength deficits identified  -LM     Row Name 01/04/23 1125          Motor Skills    Motor Skills functional endurance  -LM     Functional Endurance F-  -LM           User Key  (r) = Recorded By, (t) = Taken By, (c) = Cosigned By    Initials Name Provider Type    LM Prabha Quevedo, OT Occupational Therapist               Goals/Plan     Row Name 01/04/23 1126          Transfer Goal 1 (OT)    Activity/Assistive Device (Transfer Goal 1, OT) transfers, all;commode, 3-in-1  -LM     [place] Level/Cues Needed (Transfer Goal 1, OT) modified independence  -LM     Time Frame (Transfer Goal 1, OT) by discharge  -LM     Row Name 01/04/23 1126          Problem Specific Goal 1 (OT)    Strategies/Barriers (Problem Specific Goal 1, OT) increase fxl activity tolerance to F+ to assist with badl and fxl mobility  -LM     Row Name 01/04/23 1126          Therapy Assessment/Plan (OT)    Planned Therapy Interventions (OT) activity tolerance training;adaptive equipment training;BADL retraining;patient/caregiver education/training;transfer/mobility retraining;strengthening exercise;ROM/therapeutic exercise  -LM           User Key  (r) = Recorded By, (t) = Taken By, (c) = Cosigned By    Initials Name Provider Type    LM Prabha Quevedo OT Occupational Therapist               Clinical Impression     Row Name 01/04/23 1125          Plan of Care Review    Plan of Care Reviewed With patient  -LM     Row Name 01/04/23 1125          Therapy Assessment/Plan (OT)    Patient/Family Therapy Goal Statement (OT) return to plof  -LM      Rehab Potential (OT) good, to achieve stated therapy goals  -LM     Criteria for Skilled Therapeutic Interventions Met (OT) yes;meets criteria;skilled treatment is necessary  -LM     Therapy Frequency (OT) other (see comments)  prn and/or to monitor fxl progress  -LM     Row Name 01/04/23 1125          Therapy Plan Review/Discharge Plan (OT)    Anticipated Discharge Disposition (OT) home with assist  -LM     Row Name 01/04/23 1125          Positioning and Restraints    Post Treatment Position chair  -LM     In Chair with family/caregiver;call light within reach;encouraged to call for assist  -LM           User Key  (r) = Recorded By, (t) = Taken By, (c) = Cosigned By    Initials Name Provider Type    LM Prabha Quevedo, OT Occupational Therapist               Outcome Measures    No documentation.                   OT Recommendation and Plan  Planned Therapy Interventions (OT): activity tolerance training, adaptive equipment training, BADL retraining, patient/caregiver education/training, transfer/mobility retraining, strengthening exercise, ROM/therapeutic exercise  Therapy Frequency (OT): other (see comments) (prn and/or to monitor fxl progress)  Plan of Care Review  Plan of Care Reviewed With: patient     Time Calculation:     Therapy Charges for Today     Code Description Service Date Service Provider Modifiers Qty    [id number]  OT EVAL MOD COMPLEXITY 4 1/4/2023 Prabha Quevedo, HAILEY GO 1               Prabha Quevedo OT  1/4/2023

## 2023-01-04 NOTE — PLAN OF CARE
Goal Outcome Evaluation:  Plan of Care Reviewed With: patient        Progress: improving  Outcome Evaluation: Patient went down for CTA this morning. LAC IV infiltrated. Having normal bowel movements with not abdominal discomfort. Patient ambulated in lugo with PT and to the bathroom multiple times today. VSS.

## 2023-01-04 NOTE — PROGRESS NOTES
"    [facility] HOSPITALIST PROGRESS NOTE     Patient Identification:  Name:  Yamile Angel  Age:  77 y.o.  Sex:  female  :  1945  MRN:  [id number]  Visit Number:  [id number]  ROOM: [address]     Primary Care Provider:  Cristofer Garcia MD    Length of stay in inpatient status:  4    Subjective     Chief Compliant:    Chief Complaint   Patient presents with   • Nausea   • Vomiting     History of Presenting Illness:    Patient remains ill but stable today, no acute events overnight, no new complaints, denies any fevers or chills, denies any abdominal pain, CTA though with notable celiac and mesenteric narrowing, follow up General Surgery recommendations today, remains on full liquid diet for now, remains of home anticoagulation, which would resume if no interventions or procedures planned, she is continued on empiric antibiotics and will complete sufficient course soon, cultures remain negative to date, she remains on mIVFs, will decrease rate, Cardiology yesterday did feel patient has HOCM and plans to monitor serially outpatient with noninvasive imaging, did approach patient for Advanced Care Planning discussions and noted she does have POA, though is her ex , we did discuss she has 3 adult children as well who normally would be making her decisions now that she is  though could fall to her ex- now that he still has POA, she stated he was a \"good man\" and her \"children's father\" and was ok with him still being her POA, she deferred filling out any other documents at this time. PT/OT evals ordered and pending as well.   Objective     Current Hospital Meds:  atorvastatin, 40 mg, Oral, Nightly  cefepime, 2 g, Intravenous, Q24H  ferrous sulfate, 325 mg, Oral, BID  gabapentin, 300 mg, Oral, Q8H  heparin (porcine), 5,000 Units, Subcutaneous, Q8H  hyaluronidase 150 units in NS 10 ml syringe, 150 Units, Subcutaneous, Once  metoprolol succinate XL, 25 mg, Oral, " Q24H  metroNIDAZOLE, 500 mg, Intravenous, Q8H  oxyCODONE-acetaminophen, 1 tablet, Oral, 4x Daily  polyethylene glycol, 17 g, Oral, Daily  traZODone, 25 mg, Oral, Nightly  dextrose 5 % and sodium chloride 0.45 % with KCl 20 mEq/L, 75 mL/hr, Last Rate: 75 mL/hr (01/04/23 0558)      ----------------------------------------------------------------------------------------------------------------------  Vital Signs:  Temp:  [98.1 °F (36.7 °C)-99.6 °F (37.6 °C)] 99.6 °F (37.6 °C)  Heart Rate:  [69-98] 98  Resp:  [14-24] 14  BP: (113-153)/(61-98) 138/73  SpO2:  [81 %-98 %] 95 %  on  Flow (L/min):  [2] 2;   Device (Oxygen Therapy): room air  Body mass index is 24.07 kg/m².      Intake/Output Summary (Last 24 hours) at 1/4/2023 1059  Last data filed at 1/4/2023 0956  Gross per 24 hour   Intake 1855 ml   Output 800 ml   Net 1055 ml      ----------------------------------------------------------------------------------------------------------------------  Physical exam:  Constitutional:  Elderly.  No acute distress.      HENT:  Head:  Normocephalic and atraumatic.  Mouth:  Moist mucous membranes.    Eyes:  Conjunctivae and EOM are normal. No scleral icterus.    Neck:  Neck supple.  No JVD present.    Cardiovascular:  Regular rate, regular rhythm, systolic ejection murmur LSB with radiation to carotids  Pulmonary/Chest:  No respiratory distress, no wheezes, no crackles, on room air  Abdominal:  Soft. No distension and no tenderness.   Musculoskeletal:  No tenderness, and no deformity.  No red or swollen joints anywhere.    Neurological:  Alert and oriented to person, place, and time.  No gross focal deficits   Skin:  Skin is warm and dry. No rash noted. No pallor.   Peripheral vascular:  No clubbing, no cyanosis, no edema.  Psychiatric: Appropriate mood and affect    *Examination stable today 1/4  Edited by: Jean Claude Fuller MD at 1/4/2023  1048  ----------------------------------------------------------------------------------------------------------------------  WBC/HGB/HCT/PLT   7.84/10.3/31.5/178 (01/04 0001)  BUN/CREAT/GLUC/ALT/AST/CAM/LIP    7/0.55/114/28/53/--/-- (01/04 0001)  LYTES - Na/K/Cl/CO2: 137/3.6/101/24.4 (01/04 0001)     No results found for: URINECX  Blood Culture   Date Value Ref Range Status   12/31/2022 No growth at 3 days  Preliminary   12/31/2022 No growth at 3 days  Preliminary     I have personally looked at the labs and they are summarized above.  ----------------------------------------------------------------------------------------------------------------------  Detailed radiology reports for the last 24 hours:  CT Abdomen Pelvis With & Without Contrast    Result Date: 1/2/2023   1. Interval improvement in appearance of the bowel. Fluid-filled loops of small bowel but no distention. Right colon is distended and fluid-filled but no evidence of obstruction. Moderate volume stool distally.  2.Mild thickening of the urinary bladder wall  3. Other findings as above       This report was finalized on 1/2/2023 3:15 PM by Dr. Liam Chinchilla MD.      CT Angiogram Abdomen Pelvis    Result Date: 1/4/2023  1. Narrowing at the origin of both the celiac and superior mesenteric arteries. 2. Fluid-filled loops of small bowel without small bowel wall thickening or stranding. 3. No free fluid. 4. Other findings as above.       US Carotid Bilateral    Result Date: 1/3/2023  Impression:  1. Retrograde flow in the left vertebral artery.  2. No evidence of hemodynamically significant plaques or stenosis within the carotid system at this time.  This report was finalized on 1/3/2023 4:26 PM by Dr. Liam Chinchilla MD.      Assessment & Plan    77F Smoker PMH Arthritis, Hypertension, Hyperlipidemia, COPD, presented with nausea, vomiting, and abdominal pain.     #Acute Small Bowel Ileus - Improved   #Rule out ischemic colitis   #Undifferentiated Shock  with Lactic Acidosis - Resolved   #SIRS criteria met, would meet septic shock though no identified source of infection to this point  - Patient presented with nausea and vomiting, abdominal pain, was hypotensive requiring IVFs and pressors, no identifiable source of infection to this point  - General Surgery consulted and following, CTA today showed narrowing at origin of both celiac and superior mesenteric arteries, fluid filled loops of small bowel without wall thickening, follow up updated surgery recs  - Continue Cefepime and flagyl for empiric coverage though plan for short 5 days course and should be completing soon, continue mIVFs, off pressors  - Continue Tylenol as needed for fevers  - Admitted to PCU, monitor on telemetry, monitor for clinical improvement    #Hypertension/Hyperlipidemia  #Elevated proBNP, concern for HOCM    - Labs showed troponins negative x 2, proBNP 2000  - Echocardiogram showed LVEF 61-65%, mild septal asymmetric hypertrophy, diastolic dysfunction, left ventricle intracavitary gradient 70mmHg  - Cardiology consulted and following, Dr. Sanon does suspect HOCM and plans to monitor outpatient with serial noninvasive imaging, will consider referral to [facility]OCM clinic outpatient, may need to consider Alcohol septal ablation in the future  - Continue home Statin  - Home beta blocker resumed   - Home ACEI/Hydrochlorothiazide combo pill held on admission, resume as indicated   - Home low dose eliquis held on admission, likely resume pending General Surgery recommendations on need for any intervention.   - Continue to monitor on telemetry, strict I/O's, trend heart rate and blood pressure     #COPD/Emphysema without Acute Exacerbation  - Add Duonebs as needed, Will order COPD rescue kit at time of discharge      #Chronic Pain  - Reviewed PATTIE, continue home regimen    #Advanced Care Planning  - Due to patient's acute illness requiring admission to the hospital I have approached conversations  regarding Advanced Care Planning. Pertinent diagnoses to this discussion included concern for ischemic colitis on admission, possible infection.  At the time of our discussion only the patient was present. I have consented her regarding her willingness to discuss ACP services and she has agreed.  Our discussion included her social status with her 3 children who help assist her with her care, the possibility of coming back to the hospital much sicker if her issues were to progress. We discussed the various ACP documents that could be potentially addressed and filled out at this time including Living Will, Instruction Directives, designation of a Healthcare Proxy, designation of a Healthcare Power of .  Patient notes her ex- is her POA and she is his, we discussed that in most circumstances if a person is  her adult children would make her decisions though confirmed she still has a good relationship with her ex- and would be ok with him continuing to make her decisions as her POA.  At this time patient is unsure about proceeding to fill out some of these other documents.  I have directed her to her PCP, which she stated has brought this up to her in the past as well, if she were to change her mind about filling out any of these documents. I have spent 18 minutes of time (8:50-9:18AM) in face-to-face conversation on the above described ACP services     #Tobacco Dependence  - Recommended cessation, nicotine replacement therapy as needed     #Non-Oliguric Acute Kidney Injury, unclear etiology, possibly due to shock state, though also on ACEI/Hydrochlorothiazide at home - Resolved   - Unclear baseline but no history of Chronic Kidney Disease, creatinine 1.8 on admission, CT without obstruction, creatinine now normal; Trend Cr and urine output, avoid nephrotoxins, NSAIDs, dehydration and contrast as able.    #Acute Hypoxic Respiratory Failure in setting of Elevated D-Dimer - Resolved   -  Tachycardia, elevated D-dimer, hypoxia concerning for Pulmonary Embolism on admission, CT Chest without contrast without parenchymal abnormality, VQ showed low probability of Pulmonary Embolism. Clinically without Pulmonary Embolism, VQ should be sufficient to rule out Pulmonary Embolism. No further testing warranted at this time.    #Electrolyte Abnormalities  - Acute Mild Hypokalemia - potassium down to 3.1, Replaced per protocol - Resolved     F: Oral  E: Monitor & Replace PRN  N: Diet: Regular/House Diet, Gastrointestinal Diets; Fiber-Restricted; Texture: Soft to Chew (NDD 3); Soft to Chew: Whole Meat; Fluid Consistency: Thin (IDDSI 0)  NPO Diet NPO Type: Strict NPO, Sips with Meds  PPx: SQH  Code Status (Patient has no pulse and is not breathing): CPR (Attempt to Resuscitate)  Medical Interventions (Patient has pulse or is breathing): Full Support     Dispo: Pending workup and clinical improvement, PT/OT evals ordered and pending     *This patient is considered high risk secondary to undifferentiated shock, hypoxia, Acute Kidney Injury.     Edited by: Jean Claude Fuller MD at 1/4/2023 1058  [facility] Hospitalist

## 2023-01-05 VITALS
DIASTOLIC BLOOD PRESSURE: 90 MMHG | WEIGHT: 153.7 LBS | SYSTOLIC BLOOD PRESSURE: 109 MMHG | HEIGHT: 67 IN | RESPIRATION RATE: 20 BRPM | BODY MASS INDEX: 24.12 KG/M2 | HEART RATE: 101 BPM | TEMPERATURE: 97.9 F | OXYGEN SATURATION: 96 %

## 2023-01-05 LAB
ALBUMIN SERPL-MCNC: 2.6 G/DL (ref 3.5–5.2)
ALBUMIN/GLOB SERPL: 0.9 G/DL
ALP SERPL-CCNC: 77 U/L (ref 39–117)
ALT SERPL W P-5'-P-CCNC: 27 U/L (ref 1–33)
ANION GAP SERPL CALCULATED.3IONS-SCNC: 9.7 MMOL/L (ref 5–15)
AST SERPL-CCNC: 53 U/L (ref 1–32)
BACTERIA SPEC AEROBE CULT: NORMAL
BACTERIA SPEC AEROBE CULT: NORMAL
BILIRUB SERPL-MCNC: 0.3 MG/DL (ref 0–1.2)
BUN SERPL-MCNC: 7 MG/DL (ref 8–23)
BUN/CREAT SERPL: 11.5 (ref 7–25)
CALCIUM SPEC-SCNC: 7.6 MG/DL (ref 8.6–10.5)
CHLORIDE SERPL-SCNC: 102 MMOL/L (ref 98–107)
CO2 SERPL-SCNC: 24.3 MMOL/L (ref 22–29)
CREAT SERPL-MCNC: 0.61 MG/DL (ref 0.57–1)
DEPRECATED RDW RBC AUTO: 51 FL (ref 37–54)
EGFRCR SERPLBLD CKD-EPI 2021: 92.2 ML/MIN/1.73
ERYTHROCYTE [DISTWIDTH] IN BLOOD BY AUTOMATED COUNT: 13.4 % (ref 12.3–15.4)
GLOBULIN UR ELPH-MCNC: 3 GM/DL
GLUCOSE SERPL-MCNC: 102 MG/DL (ref 65–99)
HCT VFR BLD AUTO: 33 % (ref 34–46.6)
HGB BLD-MCNC: 10.6 G/DL (ref 12–15.9)
MCH RBC QN AUTO: 32.9 PG (ref 26.6–33)
MCHC RBC AUTO-ENTMCNC: 32.1 G/DL (ref 31.5–35.7)
MCV RBC AUTO: 102.5 FL (ref 79–97)
PLATELET # BLD AUTO: 149 10*3/MM3 (ref 140–450)
PMV BLD AUTO: 9.9 FL (ref 6–12)
POTASSIUM SERPL-SCNC: 3.8 MMOL/L (ref 3.5–5.2)
PROT SERPL-MCNC: 5.6 G/DL (ref 6–8.5)
RBC # BLD AUTO: 3.22 10*6/MM3 (ref 3.77–5.28)
SODIUM SERPL-SCNC: 136 MMOL/L (ref 136–145)
WBC NRBC COR # BLD: 9.06 10*3/MM3 (ref 3.4–10.8)

## 2023-01-05 PROCEDURE — 94799 UNLISTED PULMONARY SVC/PX: CPT

## 2023-01-05 PROCEDURE — 80053 COMPREHEN METABOLIC PANEL: CPT | Performed by: INTERNAL MEDICINE

## 2023-01-05 PROCEDURE — 97530 THERAPEUTIC ACTIVITIES: CPT

## 2023-01-05 PROCEDURE — 85027 COMPLETE CBC AUTOMATED: CPT | Performed by: INTERNAL MEDICINE

## 2023-01-05 PROCEDURE — 99239 HOSP IP/OBS DSCHRG MGMT >30: CPT | Performed by: INTERNAL MEDICINE

## 2023-01-05 PROCEDURE — 97116 GAIT TRAINING THERAPY: CPT

## 2023-01-05 PROCEDURE — 25010000002 HEPARIN (PORCINE) PER 1000 UNITS: Performed by: INTERNAL MEDICINE

## 2023-01-05 PROCEDURE — 25010000002 CEFEPIME PER 500 MG: Performed by: INTERNAL MEDICINE

## 2023-01-05 PROCEDURE — 94761 N-INVAS EAR/PLS OXIMETRY MLT: CPT

## 2023-01-05 RX ORDER — DOXYCYCLINE 100 MG/1
CAPSULE ORAL
Qty: 10 CAPSULE | Refills: 0 | Status: SHIPPED | OUTPATIENT
Start: 2023-01-05 | End: 2023-02-13 | Stop reason: HOSPADM

## 2023-01-05 RX ORDER — TRAZODONE HYDROCHLORIDE 50 MG/1
25 TABLET ORAL NIGHTLY
Qty: 15 TABLET | Refills: 0 | Status: SHIPPED | OUTPATIENT
Start: 2023-01-05

## 2023-01-05 RX ORDER — METOPROLOL SUCCINATE 50 MG/1
50 TABLET, EXTENDED RELEASE ORAL
Qty: 30 TABLET | Refills: 0 | Status: SHIPPED | OUTPATIENT
Start: 2023-01-06 | End: 2023-01-05 | Stop reason: SDUPTHER

## 2023-01-05 RX ORDER — ALBUTEROL SULFATE 90 UG/1
2 AEROSOL, METERED RESPIRATORY (INHALATION) EVERY 4 HOURS PRN
Qty: 18 G | Refills: 0 | Status: SHIPPED | OUTPATIENT
Start: 2023-01-05

## 2023-01-05 RX ORDER — PREDNISONE 20 MG/1
40 TABLET ORAL DAILY
Qty: 10 TABLET | Refills: 0 | Status: SHIPPED | OUTPATIENT
Start: 2023-01-05 | End: 2023-02-13 | Stop reason: HOSPADM

## 2023-01-05 RX ORDER — IPRATROPIUM BROMIDE AND ALBUTEROL SULFATE 2.5; .5 MG/3ML; MG/3ML
SOLUTION RESPIRATORY (INHALATION)
Qty: 18 ML | Refills: 0 | Status: SHIPPED | OUTPATIENT
Start: 2023-01-05

## 2023-01-05 RX ORDER — METOPROLOL SUCCINATE 50 MG/1
50 TABLET, EXTENDED RELEASE ORAL
Status: DISCONTINUED | OUTPATIENT
Start: 2023-01-06 | End: 2023-01-05 | Stop reason: HOSPADM

## 2023-01-05 RX ORDER — ATORVASTATIN CALCIUM 40 MG/1
40 TABLET, FILM COATED ORAL NIGHTLY
Qty: 30 TABLET | Refills: 0 | Status: SHIPPED | OUTPATIENT
Start: 2023-01-05

## 2023-01-05 RX ORDER — ATORVASTATIN CALCIUM 40 MG/1
40 TABLET, FILM COATED ORAL NIGHTLY
Qty: 30 TABLET | Refills: 0 | Status: SHIPPED | OUTPATIENT
Start: 2023-01-05 | End: 2023-01-05 | Stop reason: SDUPTHER

## 2023-01-05 RX ORDER — METOPROLOL SUCCINATE 50 MG/1
50 TABLET, EXTENDED RELEASE ORAL
Qty: 30 TABLET | Refills: 0 | Status: SHIPPED | OUTPATIENT
Start: 2023-01-06 | End: 2023-01-19

## 2023-01-05 RX ORDER — TRAZODONE HYDROCHLORIDE 50 MG/1
25 TABLET ORAL NIGHTLY
Qty: 15 TABLET | Refills: 0 | Status: SHIPPED | OUTPATIENT
Start: 2023-01-05 | End: 2023-01-05 | Stop reason: SDUPTHER

## 2023-01-05 RX ADMIN — OXYCODONE HYDROCHLORIDE AND ACETAMINOPHEN 1 TABLET: 10; 325 TABLET ORAL at 07:58

## 2023-01-05 RX ADMIN — Medication 10 ML: at 08:00

## 2023-01-05 RX ADMIN — METOPROLOL SUCCINATE 25 MG: 25 TABLET, EXTENDED RELEASE ORAL at 08:00

## 2023-01-05 RX ADMIN — HEPARIN SODIUM 5000 UNITS: 5000 INJECTION INTRAVENOUS; SUBCUTANEOUS at 05:34

## 2023-01-05 RX ADMIN — GABAPENTIN 300 MG: 300 CAPSULE ORAL at 05:34

## 2023-01-05 RX ADMIN — Medication 3 ML: at 08:01

## 2023-01-05 RX ADMIN — CEFEPIME 2 G: 2 INJECTION, POWDER, FOR SOLUTION INTRAVENOUS at 07:58

## 2023-01-05 RX ADMIN — METRONIDAZOLE 500 MG: 500 INJECTION, SOLUTION INTRAVENOUS at 05:34

## 2023-01-05 RX ADMIN — FERROUS SULFATE TAB 325 MG (65 MG ELEMENTAL FE) 325 MG: 325 (65 FE) TAB at 08:00

## 2023-01-05 RX ADMIN — Medication 10 ML: at 08:01

## 2023-01-05 RX ADMIN — APIXABAN 2.5 MG: 2.5 TABLET, FILM COATED ORAL at 08:00

## 2023-01-05 NOTE — CASE MANAGEMENT/SOCIAL WORK
Continued Stay Note  JUNIOR [place]     Patient Name: Yamile Angel  MRN: [id number]  Today's Date: 1/5/2023    Admit Date: 12/31/2022       Discharge Plan     Row Name 01/05/23 1100       Plan    Plan Patient is being dc home today. Referral received and faxed to [facility] for walker and a nebulizer, pt preference. Spoke with [facility], equipment will be delivered to pt's room as they had already been contacted for hospital delivery by [facility]. Family will provide transport home. No further needs.    Patient/Family in Agreement with Plan yes    Row Name 01/05/23 0831       Plan    Final Discharge Disposition Code 01 - home or self-care    Final Note DC home today.               Discharge Codes    No documentation.               Expected Discharge Date and Time     Expected Discharge Date Expected Discharge Time    Jan 5, 2023             Meghna Leos RN

## 2023-01-05 NOTE — DISCHARGE INSTR - APPOINTMENTS
Jan 01, 2023 @ 11:15 AM                                                                                                        [address] [place] [address] LN  [place] [address]  [facility]                                                                                                              [phone]                                                                                                 Subjective:    Patient ID:  Walt Lopez is a 55 y.o. male who presents for evaluation of Atrial Fibrillation and Hospital Follow Up      PCP: Primary Doctor Elis WAGNER  Pt is a 56 yo M w/ PMH of Atrial Fibrillation s/p RFA, AFL s/p RFA 3/2021, and Hypothyroidism who presents for eval s/p hospitalization.  He was previously followed by [facility] cardiology for EP and is s/p multiple RFA for A. Fib/Flutter.  He presented to the ED w/ c/o palpitations and was noted to be in A. Fib w/ RVR and states that 2 weeks prior he was prescribed diethylpropion for weight loss.  This was removed and his episode was short lived.  He denies cp, sob, edema, orthopnea, PND, presyncope, LOC, and claudication.  He notes the palpitations have resolved.  He notes compliance w/ meds and denies side effects.  He exercises 2x/wk by doing HIIT for 1 hr and denies limitations.  He does not monitor his BP at home regularly.       Past Medical History:   Diagnosis Date    Atrial fibrillation     History of prior ablation treatment     Hypothyroidism     S/P ablation of atrial fibrillation      Past Surgical History:   Procedure Laterality Date    CARDIAC ELECTROPHYSIOLOGY STUDY AND ABLATION      ESOPHAGOGASTRODUODENOSCOPY N/A 10/28/2021    Procedure: EGD (ESOPHAGOGASTRODUODENOSCOPY);  Surgeon: Patricia Soni MD;  Location: [facility];  Service: Endoscopy;  Laterality: N/A;    KNEE SURGERY      NASAL SEPTOPLASTY N/A 7/29/2021    Procedure: SEPTOPLASTY, NOSE;  Surgeon: Josué Villafuerte MD;  Location: [address];  Service: ENT;  Laterality: N/A;    NASAL TURBINATE REDUCTION Bilateral 7/29/2021    Procedure: REDUCTION, NASAL TURBINATE;  Surgeon: Josué Villafuerte MD;  Location: [facility] OR [address] [place] [address];  Service: ENT;  Laterality: Bilateral;     Social History     Socioeconomic History    Marital status:    Tobacco Use    Smoking status: Never    Smokeless tobacco: Never   Substance and Sexual Activity    Alcohol use: Yes     Comment:  occasional    Drug use: Never    Sexual activity: Yes     Partners: Female     Family History   Problem Relation Age of Onset    Hypertension Mother     Atrial fibrillation Mother     Cancer Father        Review of patient's allergies indicates:  No Known Allergies    Medication List with Changes/Refills   Current Medications    ASPIRIN 81 MG CHEW    Take 1 tablet (81 mg total) by mouth once daily.    LEVOTHYROXINE 200 MCG CAP    Take 200 mcg by mouth.     MECOBALAMIN, VITAMIN B12, (B12 ACTIVE) 1,000 MCG CHEW        MULTIVIT-MIN/FA/LYCOPEN/LUTEIN (CENTRUM SILVER MEN ORAL)    Take 1 tablet by mouth once daily.    OMEGA-3 FATTY ACIDS-FISH -1,200 MG CPDR    Take 1,000 mg by mouth.       Review of Systems   Constitutional: Negative for diaphoresis and fever.   HENT:  Negative for congestion and hearing loss.    Eyes:  Negative for blurred vision and pain.   Cardiovascular:  Negative for chest pain, claudication, dyspnea on exertion, leg swelling, near-syncope, palpitations and syncope.   Respiratory:  Negative for shortness of breath and sleep disturbances due to breathing.    Hematologic/Lymphatic: Negative for bleeding problem. Does not bruise/bleed easily.   Skin:  Negative for color change and poor wound healing.   Gastrointestinal:  Negative for abdominal pain and nausea.   Genitourinary:  Negative for bladder incontinence and flank pain.   Neurological:  Negative for focal weakness and light-headedness.      Objective:   /72   Pulse 75   Ht 6' (1.829 m)   Wt 111.6 kg (246 lb)   SpO2 98%   BMI 33.36 kg/m²    Physical Exam  Constitutional:       Appearance: He is well-developed. He is not diaphoretic.   HENT:      Head: Normocephalic and atraumatic.   Eyes:      General: No scleral icterus.     Pupils: Pupils are equal, round, and reactive to light.   Neck:      Vascular: No JVD.   Cardiovascular:      Rate and Rhythm: Normal rate and regular rhythm.      Pulses: Intact distal pulses.      Heart  sounds: S1 normal and S2 normal. No murmur heard.    No friction rub. No gallop.   Pulmonary:      Effort: Pulmonary effort is normal. No respiratory distress.      Breath sounds: Normal breath sounds. No wheezing or rales.   Chest:      Chest wall: No tenderness.   Abdominal:      General: Bowel sounds are normal. There is no distension.      Palpations: Abdomen is soft. There is no mass.      Tenderness: There is no abdominal tenderness. There is no rebound.   Musculoskeletal:         General: No tenderness. Normal range of motion.      Cervical back: Normal range of motion and neck supple.   Skin:     General: Skin is warm and dry.      Coloration: Skin is not pale.   Neurological:      Mental Status: He is alert and oriented to person, place, and time.      Coordination: Coordination normal.      Deep Tendon Reflexes: Reflexes normal.   Psychiatric:         Behavior: Behavior normal.         Judgment: Judgment normal.         EC2022- reviewed.  A. Fib w/ RVR, can not r/o anterior infarct.      Echo: 2022- reviewed.    Summary    The left ventricle is normal in size with concentric remodeling and normal systolic function.  The estimated ejection fraction is 60%.  Normal left ventricular diastolic function.  Normal right ventricular size with normal right ventricular systolic function.  Mild aortic regurgitation.  Mild mitral regurgitation.  Mild pulmonic regurgitation.  Normal central venous pressure (3 mmHg).    Assessment:       1. PAF (paroxysmal atrial fibrillation)    2. S/P ablation of atrial flutter         Plan:         PAF (paroxysmal atrial fibrillation)  CHADSVAS=0.  Continue w/ ASA.  Pt was previously followed by [facility] EP and is s/p RFA.    - obtain [facility] records for EP  - continue medical therapy   - risk factor and lifestyle modifications   - check ECG  - refer to EP  - check ETT to r/o ischemia as may attempt pill in pocket method v. maintenance     S/P ablation of atrial flutter  Obtain  records and refer to EP.        Total duration of face to face visit time 30 minutes.  Total time spent counseling greater than fifty percent of total visit time.  Counseling included discussion regarding imaging findings, diagnosis, possibilities, treatment options, risks and benefits.  The patient had many questions regarding the options and long-term effects      Kiel Britton M.D.  Interventional Cardiology

## 2023-01-05 NOTE — PLAN OF CARE
Goal Outcome Evaluation:              Outcome Evaluation: Has been up to bathroom and to bsc with 1 assist. Rested well most of shift . V/s stable . Snack from kitchen brought before bedtime

## 2023-01-05 NOTE — DISCHARGE SUMMARY
[facility]    Patient Identification:  Name:  Yamile Angel  Age:  77 y.o.  Sex:  female  :  1945  MRN:  [id number]  Visit Number:  [id number]    Date of Admission: 2022  Date of Discharge:  2023     PCP: Cristofer Garcia MD    DISCHARGE DIAGNOSIS  Chronic Intestinal Ischemia  Acute Small Bowel Ileus - Resolved   Undifferentiated Shock with Lactic Acidosis - Resolved   SIRS criteria met, would meet septic shock though no identified source of infection to this point  Non-Oliguric Acute Kidney Injury, unclear etiology, possibly due to shock state, though also on ACEI/Hydrochlorothiazide at home - Resolved   Acute Hypoxic Respiratory Failure, unclear etiology, in setting of Elevated D-Dimer - Resolved   Hypertension  Hyperlipidemia  Elevated proBNP, concern for HOCM    History Atrial Fibrillation   Acute Mild Hypokalemia - Resolved   COPD/Emphysema without Acute Exacerbation  Chronic Pain  Advanced Care Planning  Tobacco Dependence    CONSULTS   Consults     Date and Time Order Name Status Description    2023 10:26 AM Inpatient Cardiology Consult Completed     2023  9:20 AM Inpatient General Surgery Consult for Line Placement Completed     2022  6:38 PM Inpatient General Surgery Consult      2022  3:17 PM Hospitalist (on-call MD unless specified)          PROCEDURES PERFORMED  None    HOSPITAL COURSE  77F Smoker PMH Arthritis, Hypertension, Hyperlipidemia, COPD, presented with nausea, vomiting, and abdominal pain.     #Chronic Intestinal Ischemia  #Acute Small Bowel Ileus - Resolved   #Undifferentiated Shock with Lactic Acidosis - Resolved   #SIRS criteria met, would meet septic shock though no identified source of infection to this point  Patient presented with nausea and vomiting, abdominal pain, was hypotensive requiring IVFs and pressors, no identifiable source of infection to this point.  She was treated with 5 days of  empiric Cefepime and Flagyl though blood cultures remain negative to date.  General Surgery consulted and followed, obtained CTA showing narrowing at origin of both celiac and superior mesenteric arteries, fluid filled loops of small bowel without wall thickening.  General Surgery recommended outpatient vascular surgery evaluation and have ordered referral at time of discharge.  Follow up PCP 1 week, follow up Dr. Langston 2-4 weeks. Patient currently stooling, tolerating diet, ambulatory though will prescribe walker at time of discharge per PT.  PT/OT consulted and followed recommended home with assist.  Patient notes has family that lives next door to help as well.     #Non-Oliguric Acute Kidney Injury, unclear etiology, possibly due to shock state, though also on ACEI/Hydrochlorothiazide at home - Resolved   Unclear baseline but no history of Chronic Kidney Disease, creatinine 1.8 on admission, CT without obstruction, creatinine now normal. Trended Cr and urine output, avoided nephrotoxins, NSAIDs, dehydration and contrast as able.    #Acute Hypoxic Respiratory Failure, unclear etiology, in setting of Elevated D-Dimer - Resolved   Tachycardia, elevated D-dimer, hypoxia concerning for Pulmonary Embolism on admission, CT Chest without contrast without parenchymal abnormality, VQ showed low probability of Pulmonary Embolism. Clinically without Pulmonary Embolism, VQ should be sufficient to rule out Pulmonary Embolism. No further testing warranted at this time.    #Hypertension/Hyperlipidemia  #Elevated proBNP, concern for HOCM    #History Atrial Fibrillation   Labs showed troponins negative x 2, proBNP 2000. Echocardiogram showed LVEF 61-65%, mild septal asymmetric hypertrophy, diastolic dysfunction, left ventricle intracavitary gradient 70mmHg. Cardiology consulted and followed and also suspected HOCM, plan to monitor outpatient with serial noninvasive imaging, will consider referral to [facility] clinic at a later day  if need to consider Alcohol septal ablation in the future.  Continued home statin, beta blocker changed to metoprolol succinate.  Have held home ACEI/Hydrochlorothiazide combo pill due to normotension.  Home eliquis resumed though increased to full dose as doesn't meet low dose criteria for age, weight, renal failure and denied any history of significant bleeding. Follow up Cardiology 2-3 weeks, follow up PCP 1 week.    #Electrolyte Abnormalities  Acute Mild Hypokalemia - potassium down to 3.1, Replaced per protocol - Resolved     #COPD/Emphysema without Acute Exacerbation  Ordered COPD rescue kit at time of discharge     #Chronic Pain  Continued home regimen    #Advanced Care Planning  Conversations held 1/4, patient deferred filling out any Advanced Care Planning documents.  Directed to discuss with PCP if changes her mind.    #Tobacco Dependence  Recommended cessation, nicotine replacement therapy as needed     Edited by: Jean Claude Fuller MD at 1/5/2023 1008    VITAL SIGNS:  Temp:  [97.9 °F (36.6 °C)-98.7 °F (37.1 °C)] 97.9 °F (36.6 °C)  Heart Rate:  [] 101  Resp:  [16-20] 20  BP: (101-148)/(49-92) 109/90  SpO2:  [91 %-100 %] 96 %  on  Flow (L/min):  [2] 2;   Device (Oxygen Therapy): room air    Body mass index is 24.07 kg/m².  Wt Readings from Last 3 Encounters:   01/05/23 69.7 kg (153 lb 11.2 oz)   12/15/21 79.4 kg (175 lb)   11/29/21 78.5 kg (173 lb)     PHYSICAL EXAM:  Constitutional:  Elderly.  No acute distress.      HENT:  Head:  Normocephalic and atraumatic.  Mouth:  Moist mucous membranes.    Eyes:  Conjunctivae and EOM are normal. No scleral icterus.    Neck:  Neck supple.  No JVD present.    Cardiovascular:  Regular rate, regular rhythm, systolic ejection murmur LSB with radiation to carotids  Pulmonary/Chest:  No respiratory distress, no wheezes, no crackles, on room air  Abdominal:  Soft. No distension and no tenderness.   Musculoskeletal:  No tenderness, and no deformity.    Neurological:  Alert  and oriented to person, place, and time.  No gross focal deficits   Skin:  Skin is warm and dry. No rash noted. No pallor.   Peripheral vascular:  No clubbing, no cyanosis, no edema.  Psychiatric: Appropriate mood and affect    *Examination stable today 1/5  Edited by: Jean Claude Fuller MD at 1/5/2023 1005    DISCHARGE DISPOSITION   Stable    DISCHARGE MEDICATIONS:     Discharge Medications      New Medications      Instructions Start Date   atorvastatin 40 MG tablet  Commonly known as: LIPITOR  Replaces: simvastatin 80 MG tablet   40 mg, Oral, Nightly      doxycycline 100 MG capsule  Commonly known as: MONODOX   Take 1 capsule by mouth every 12 hours for 5 days as part of COPD Rescue Kit. (Only Start if in YELLOW ZONE.)      ipratropium-albuterol 0.5-2.5 mg/3 ml nebulizer  Commonly known as: DUO-NEB   Take 3 mL by neb every 30 minutes as needed for shortness of air for up to 6 doses. Part of COPD Rescue Kit. (Only Start if in YELLOW ZONE.)      metoprolol succinate XL 50 MG 24 hr tablet  Commonly known as: TOPROL-XL   50 mg, Oral, Every 24 Hours Scheduled   Start Date: January 6, 2023     predniSONE 20 MG tablet  Commonly known as: DELTASONE   40 mg, Oral, Daily, Take 2 tablets daily for 5 days as part of COPD Rescue Kit. (Only Start if in YELLOW ZONE.)         Changes to Medications      Instructions Start Date   apixaban 5 MG tablet tablet  Commonly known as: ELIQUIS  What changed:   · medication strength  · how much to take   5 mg, Oral, 2 Times Daily      traZODone 50 MG tablet  Commonly known as: DESYREL  What changed:   · medication strength  · how much to take  · when to take this   Take 1/2 tablet by mouth Every Night.         Continue These Medications      Instructions Start Date   cyanocobalamin 1000 MCG/ML injection   1,000 mcg, Subcutaneous, Every 30 Days      ferrous sulfate 325 (65 FE) MG tablet   325 mg, Oral, 2 Times Daily      gabapentin 600 MG tablet  Commonly known as: NEURONTIN   600 mg, Oral, 3  Times Daily      ondansetron 8 MG tablet  Commonly known as: ZOFRAN   8 mg, Oral, 2 Times Daily PRN      oxyCODONE-acetaminophen  MG per tablet  Commonly known as: PERCOCET   1 tablet, Oral, 4 Times Daily         Stop These Medications    lisinopril-hydrochlorothiazide 20-25 MG per tablet  Commonly known as: PRINZIDE,ZESTORETIC     metoprolol tartrate 50 MG tablet  Commonly known as: LOPRESSOR     simvastatin 80 MG tablet  Commonly known as: ZOCOR  Replaced by: atorvastatin 40 MG tablet          Diet Instructions     Diet: Regular      Discharge Diet: Regular    Low residue, frequent meals        Activity Instructions     Activity as Tolerated          Additional Instructions for the Follow-ups that You Need to Schedule     Discharge Follow-up with PCP   As directed       Currently Documented PCP:    Cristofer Garcia MD    PCP Phone Number:    [phone]     Follow Up Details: 1 week PCP, needs basic labs, BP check         Discharge Follow-up with Specified Provider: Dr. Langston 2-4 weeks   As directed      To: Dr. Langston 2-4 weeks         Discharge Follow-up with Specified Provider: Dr. Sanon, Cardiology, 2-3 weeks   As directed      To: Dr. Sanon, Cardiology, 2-3 weeks         Discharge Follow-up with Specified Provider: Needs referral to see vascular surgery next available for probable intestinal ischemia   As directed      To: Needs referral to see vascular surgery next available for probable intestinal ischemia            Follow-up Information     Cristofer Garcia MD .    Specialty: Family Medicine  Why: 1 week PCP, needs basic labs, BP check  Contact information:  [address] [place] [address]  [phone]                        TEST  RESULTS PENDING AT DISCHARGE  Pending Labs     Order Current Status    Blood Culture - Blood, Arm, Left Preliminary result    Blood Culture - Blood, Arm, Right Preliminary result        CODE STATUS  Code Status and Medical Interventions:   Ordered at:  12/31/22 2242     Level Of Support Discussed With:    Patient     Code Status (Patient has no pulse and is not breathing):    CPR (Attempt to Resuscitate)     Medical Interventions (Patient has pulse or is breathing):    Full Support     Release to patient:    Routine Release     Jean Claude Fuller MD  [facility]  01/05/23  10:09 EST    Please note that this discharge summary required more than 30 minutes to complete.

## 2023-01-05 NOTE — THERAPY TREATMENT NOTE
Acute Care - Physical Therapy Treatment Note  [facility]     Patient Name: Yamile Angel  : 1945  MRN: [id number]  Today's Date: 2023   Onset of Illness/Injury or Date of Surgery: 23  Visit Dx:     ICD-10-CM ICD-9-CM   1. Sepsis with acute renal failure without septic shock, due to unspecified organism, unspecified acute renal failure type (HCC)  A41.9 038.9    R65.20 995.92    N17.9 584.9   2. Ileus (HCC)  K56.7 560.1   3. Pulmonary emphysema, unspecified emphysema type (HCC)  J43.9 492.8     Patient Active Problem List   Diagnosis   • Preoperative clearance   • Encounter for screening for malignant neoplasm of colon   • Sepsis with acute renal failure without septic shock, due to unspecified organism, unspecified acute renal failure type (HCC)     Past Medical History:   Diagnosis Date   • Arthritis    • COPD (chronic obstructive pulmonary disease) (HCC)    • Elevated cholesterol    • Hyperlipidemia    • Hypertension    • Sepsis with acute renal failure without septic shock, due to unspecified organism, unspecified acute renal failure type (HCC) 2022     Past Surgical History:   Procedure Laterality Date   • APPENDECTOMY     • COLONOSCOPY     • COLONOSCOPY N/A 12/15/2021    Procedure: COLONOSCOPY FOR SCREENING;  Surgeon: Rhonda Parada MD;  Location: [facility];  Service: Gastroenterology;  Laterality: N/A;   • EYE SURGERY     • HYSTERECTOMY     • SHOULDER SURGERY     • TUBAL ABDOMINAL LIGATION     • UPPER GASTROINTESTINAL ENDOSCOPY     • WRIST SURGERY       PT Assessment (last 12 hours)     PT Evaluation and Treatment     Row Name 23 0950          Physical Therapy Time and Intention    Document Type therapy note (daily note)  -CT     Mode of Treatment physical therapy  -CT     Patient Effort good  -CT     Comment Ambulated pt this date and recommended at RW at d/c.  -CT     Row Name 23 0947          General Information    Patient Profile Reviewed yes  -CT      Equipment Currently Used at Home none  -CT     Existing Precautions/Restrictions fall  -CT     Limitations/Impairments safety/cognitive  -CT     Row Name 01/05/23 0950          Cognition    Affect/Mental Status (Cognition) WFL  -CT     Orientation Status (Cognition) oriented x 4  -CT     Follows Commands (Cognition) WFL  -CT     Row Name 01/05/23 0950          Bed Mobility    Bed Mobility bed mobility (all) activities  -CT     All Activities, [place] (Bed Mobility) contact guard  -CT     Bed Mobility, Safety Issues decreased use of arms for pushing/pulling;decreased use of legs for bridging/pushing  -CT     Assistive Device (Bed Mobility) bed rails  -CT     Row Name 01/05/23 0950          Transfers    Transfers sit-stand transfer;stand-sit transfer  -CT     Row Name 01/05/23 0950          Sit-Stand Transfer    Sit-Stand [place] (Transfers) contact guard  -CT     Row Name 01/05/23 0950          Stand-Sit Transfer    Stand-Sit [place] (Transfers) contact guard  -CT     Row Name 01/05/23 0950          Gait/Stairs (Locomotion)    Friendship Level (Gait) contact guard  -CT     Assistive Device (Gait) walker, front-wheeled  -CT     Distance in Feet (Gait) 220  -CT     Pattern (Gait) swing-through  -CT     Deviations/Abnormal Patterns (Gait) gait speed decreased;weight shifting decreased  -CT     Bilateral Gait Deviations forward flexed posture  -CT     Row Name             Wound 01/04/23 0933 Left antecubital Extravasation    Wound - Properties Group Placement Date: 01/04/23 -BB Placement Time: 0933 -BB Side: Left  -BB Location: antecubital  -BB Primary Wound Type: Extravasatio  -BB    Retired Wound - Properties Group Placement Date: 01/04/23  -BB Placement Time: 0933 -BB Side: Left  -BB Location: antecubital  -BB Primary Wound Type: Extravasatio  -BB    Retired Wound - Properties Group Date first assessed: 01/04/23  -BB Time first assessed: 0933 -BB Side: Left  -BB Location: antecubital  -BB Primary  Wound Type: Extravasatio  -BB    Row Name 01/05/23 0950          Coping    Observed Emotional State calm;cooperative  -CT     Verbalized Emotional State acceptance  -CT     Row Name 01/05/23 0950          Positioning and Restraints    Pre-Treatment Position in bed  -CT     Post Treatment Position bed  -CT     In Bed sitting EOB;call light within reach;encouraged to call for assist  -CT     Row Name 01/05/23 0950          Therapy Assessment/Plan (PT)    Rehab Potential (PT) good, to achieve stated therapy goals  -CT     Criteria for Skilled Interventions Met (PT) yes;skilled treatment is necessary  -CT     Therapy Frequency (PT) 2 times/wk  2-5 times/wk  -CT           User Key  (r) = Recorded By, (t) = Taken By, (c) = Cosigned By    Initials Name Provider Type    CT Twila Thomas PT Physical Therapist    Clarisse Pa, RN Registered Nurse                  PT Recommendation and Plan  Anticipated Discharge Disposition (PT): home with assist  Planned Therapy Interventions (PT): balance training, bed mobility training, gait training, home exercise program, manual therapy techniques, motor coordination training, neuromuscular re-education, postural re-education, patient/family education, strengthening, transfer training  Therapy Frequency (PT): 2 times/wk (2-5 times/wk)  Plan of Care Reviewed With: patient       Time Calculation:    PT Charges     Row Name 01/05/23 1556             Time Calculation    PT Received On 01/05/23  -CT         Time Calculation- PT    Total Timed Code Minutes- PT 40 minute(s)  -CT            User Key  (r) = Recorded By, (t) = Taken By, (c) = Cosigned By    Initials Name Provider Type    CT Twila Thomas PT Physical Therapist              Therapy Charges for Today     Code Description Service Date Service Provider Modifiers Qty    96861542796 HC PT EVAL MOD COMPLEXITY 4 1/4/2023 Twila Thomas, PT GP 1    16957360755 HC GAIT TRAINING EA 15 MIN 1/5/2023 Twila Thomas, PT GP 2     [id number]  PT THERAPEUTIC ACT EA 15 MIN 1/5/2023 Twila Thomas, PT GP 1               Twila Thomas, PT  1/5/2023

## 2023-01-06 ENCOUNTER — READMISSION MANAGEMENT (OUTPATIENT)
Dept: CALL CENTER | Facility: HOSPITAL | Age: 78
End: 2023-01-06
Payer: MEDICARE

## 2023-01-06 NOTE — CASE MANAGEMENT/SOCIAL WORK
Continued Stay Note   [facility]     Patient Name: Yamile Angel  MRN: [id number]  Today's Date: 1/6/2023    Admit Date: 12/31/2022    Plan: Refaxed DME orders and last MD note to [facility] per Cydney request. Patient was dc home yesterday.   Discharge Plan     Row Name 01/06/23 1202       Plan    Plan Refaxed DME orders and last MD note to [facility] per Cydney request. Patient was dc home yesterday.               Discharge Codes    No documentation.               Expected Discharge Date and Time     Expected Discharge Date Expected Discharge Time    Jan 5, 2023             Meghna Leos RN

## 2023-01-06 NOTE — DISCHARGE PLACEMENT REQUEST
"Karmen Mullen (77 y.o. Female)     Date of Birth   1945    Social Security Number       Address   [address] [address]    Home Phone   [phone]    MRN   [id number]       Quaker   Unknown    Marital Status                               Admission Date   12/31/22    Admission Type   Emergency    Admitting Provider   Nilton Fong MD    Attending Provider       Department, Room/Bed   [facility], P207/1P       Discharge Date   1/5/2023    Discharge Disposition   Home or Self Care    Discharge Destination                               Attending Provider: (none)   Allergies: Penicillins, Sulfa Antibiotics    Isolation: None   Infection: None   Code Status: Prior    Ht: 170.2 cm (67\")   Wt: 69.7 kg (153 lb 11.2 oz)    Admission Cmt: None   Principal Problem: Sepsis with acute renal failure without septic shock, due to unspecified organism, unspecified acute renal failure type (HCC) [A41.9,R65.20,N17.9]                 Active Insurance as of 12/31/2022     Primary Coverage     Payor Plan Insurance Group Employer/Plan Group    MEDICARE MEDICARE A & B      Payor Plan Address Payor Plan Phone Number Payor Plan Fax Number Effective Dates    [address] [phone]  2/1/1998 - None Entered    [facility] [id number]       Subscriber Name Subscriber Birth Date Member ID       KARMEN MULLEN 1945 [id number]           Secondary Coverage     Payor Plan Insurance Group Employer/Plan Group    ANTHEM BLUE CROSS ANTHEM BLUE CROSS BLUE SHIELD PPO [id number]     Payor Plan Address Payor Plan Phone Number Payor Plan Fax Number Effective Dates    [address] [phone]  1/1/2021 - None Entered    [facility] [id number]       Subscriber Name Subscriber Birth Date Member ID       KARMEN MULLEN 1945 [id number]                 Emergency Contacts      (Rel.) Home Phone Work Phone Mobile Phone    KALLI MEDEIROS (Friend) -- -- " "[phone]            [address] [address]  Dept. Phone:  [phone]  Dept. Fax:  [phone] Date Ordered: 2023         Patient:  Ymaile Angel MRN:  [id number]   [address] [facility] [address] :  1945  SSN:    Phone: [phone] Sex:  F     Weight: 69.7 kg (153 lb 11.2 oz)         Ht Readings from Last 1 Encounters:   22 170.2 cm (67\")         Home Nebulizer          (Order ID: [id number])    Diagnosis:  Pulmonary emphysema, unspecified emphysema type (HCC) (J43.9 [ICD-10-CM] 492.8 [ICD-9-CM])   Quantity:  1     Nebulizer Equipment:  Nebulizer w/ Compressor  Nebulizer Accessories:  Nebulizer Kit - Administration Set, Non-Disposable  Length of Need (99 Months = Lifetime): 99 Months = Lifetime        Authorizing Provider's Phone: [phone]  Authorizing Provider:Jean Claude Fuller MD  Authorizing Provider's NPI: [id number]  Order Entered By: Jean Claude Fuller MD 2023 10:06 AM     Electronically signed by: Jean Claude Fuller MD 2023 10:06 AM     [address] [address]  Dept. Phone:  [phone]  Dept. Fax:  [phone] Date Ordered: 2023         Patient:  Yamile Angel MRN:  [id number]   [address] [place] [address] :  1945  SSN:    Phone: [phone] Sex:  F     Weight: 69.7 kg (153 lb 11.2 oz)         Ht Readings from Last 1 Encounters:   22 170.2 cm (67\")         Walker               (Order ID: [id number])    Diagnosis:  Ileus (HCC) (K56.7 [ICD-10-CM] 560.1 [ICD-9-CM])   Quantity:  1     Equipment:  Walker Folding with Wheels  Length of Need (99 Months = Lifetime): 99 Months = Lifetime        Authorizing Provider's Phone: [phone]  Authorizing Provider:Jean Claude Fuller MD  Authorizing Provider's NPI: [id number]  Order Entered By: Jean Claude Fuller MD 2023  9:59 AM     Electronically signed by: Jonny, " MD Jean Claude 1/5/2023  9:59 AM

## 2023-01-06 NOTE — OUTREACH NOTE
Prep Survey    Flowsheet Row Responses   [facility] patient discharged from? [facility]   Is LACE score < 7 ? No   Eligibility Readm Mgmt   Discharge diagnosis Sepsis   Does the patient have one of the following disease processes/diagnoses(primary or secondary)? Sepsis   Does the patient have Home health ordered? No   Is there a DME ordered? No   Prep survey completed? Yes          JO MADRID - Registered Nurse

## 2023-01-09 ENCOUNTER — READMISSION MANAGEMENT (OUTPATIENT)
Dept: CALL CENTER | Facility: HOSPITAL | Age: 78
End: 2023-01-09
Payer: MEDICARE

## 2023-01-09 NOTE — OUTREACH NOTE
Sepsis Week 1 Survey    Flowsheet Row Responses   [facility] patient [facility]? [facility]   Does the patient have one of the following disease processes/diagnoses(primary or secondary)? Sepsis   Week 1 attempt successful? Yes   Call start time 0942   Call end time 0944   Discharge diagnosis Sepsis   Meds reviewed with patient/caregiver? Yes   Is the patient having any side effects they believe may be caused by any medication additions or changes? No   Does the patient have all medications related to this admission filled (includes all antibiotics, inhalers, nebulizers,steroids,etc.) Yes   Is the patient taking all medications as directed (includes completed medication regime)? Yes   Comments regarding appointments Cards appt on 1/19   Does the patient have a primary care provider?  Yes   Does the patient have an appointment with their PCP within 7 days of discharge? No   What is preventing the patient from scheduling follow up appointments within 7 days of discharge? Haven't had time   Nursing Interventions Advised patient to make appointment   Has the patient kept scheduled appointments due by today? N/A   Has home health visited the patient within 72 hours of discharge? N/A   What DME was ordered? nebulizer, walker   Has all DME been delivered? Yes   Psychosocial issues? No   Did the patient receive a copy of their discharge instructions? Yes   Nursing interventions Reviewed instructions with patient   What is the patient's perception of their health status since discharge? Improving   Nursing interventions Nurse provided patient education   Nursing interventions Nurse provided patient education   Is patient/caregiver able to teach back steps to recovery at home? Set small, achievable goals for return to baseline health   Is the patient/caregiver able to teach back signs and symptoms of worsening condition: Shortness of breath/rapid respiratory rate, Altered mental status(confusion/coma), Edema, Fever    Is the patient/caregiver able to teach back the hierarchy of who to call/visit for symptoms/problems? PCP, Specialist, Home health nurse, Urgent Care, ED, 911 Yes   Week 1 call completed? Yes          ZAHRAA MADRID - Registered Nurse

## 2023-01-18 ENCOUNTER — READMISSION MANAGEMENT (OUTPATIENT)
Dept: CALL CENTER | Facility: HOSPITAL | Age: 78
End: 2023-01-18
Payer: MEDICARE

## 2023-01-18 NOTE — OUTREACH NOTE
Sepsis Week 2 Survey    Flowsheet Row Responses   [facility] from? [facility]   Does the patient have one of the following disease processes/diagnoses(primary or secondary)? Sepsis   Week 2 attempt successful? Yes   Call start time 1606   Call end time 1608   Discharge diagnosis Sepsis   Meds reviewed with patient/caregiver? Yes   Is the patient having any side effects they believe may be caused by any medication additions or changes? No   Does the patient have all medications related to this admission filled (includes all antibiotics, inhalers, nebulizers,steroids,etc.) Yes   Is the patient taking all medications as directed (includes completed medication regime)? Yes   Does the patient have a primary care provider?  Yes   Does the patient have an appointment with their PCP within 7 days of discharge? Yes   Has the patient kept scheduled appointments due by today? Yes   Has home health visited the patient within 72 hours of discharge? N/A   What DME was ordered? nebulizer, walker   Has all DME been delivered? Yes   Psychosocial issues? No   Did the patient receive a copy of their discharge instructions? Yes   Nursing interventions Reviewed instructions with patient   What is the patient's perception of their health status since discharge? Improving   Nursing interventions Nurse provided patient education   Is the patient/caregiver able to teach back TIME? T emperature - higher or lower than normal, I nfection - may have signs and symptoms of an infection, E xtremely Ill - severe pain, discomfort, shortness of breath   Nursing interventions Nurse provided patient education   Is patient/caregiver able to teach back steps to recovery at home? Set small, achievable goals for return to baseline health, Rest and regain strength, Eat a balanced diet, Make a list of questions for PCP appoinment   Is the patient/caregiver able to teach back signs and symptoms of worsening condition: Fever, Edema,  Shortness of breath/rapid respiratory rate   If the patient is a current smoker, are they able to teach back resources for cessation? [id number]   Is the patient/caregiver able to teach back the hierarchy of who to call/visit for symptoms/problems? PCP, Specialist, Home health nurse, Urgent Care, ED, 911 Yes   Additional teach back comments She is doing better, appts coming up she says.   Week 2 call completed? Yes   Wrap up additional comments No questions at this time.          PATRICIO WHITTINGTON - Registered Nurse

## 2023-01-19 ENCOUNTER — OFFICE VISIT (OUTPATIENT)
Dept: CARDIOLOGY | Facility: CLINIC | Age: 78
End: 2023-01-19
Payer: MEDICARE

## 2023-01-19 VITALS
WEIGHT: 147.8 LBS | BODY MASS INDEX: 23.2 KG/M2 | DIASTOLIC BLOOD PRESSURE: 79 MMHG | OXYGEN SATURATION: 97 % | HEIGHT: 67 IN | SYSTOLIC BLOOD PRESSURE: 131 MMHG | HEART RATE: 87 BPM

## 2023-01-19 DIAGNOSIS — I42.1 HOCM (HYPERTROPHIC OBSTRUCTIVE CARDIOMYOPATHY): Primary | ICD-10-CM

## 2023-01-19 DIAGNOSIS — I48.0 PAROXYSMAL ATRIAL FIBRILLATION: ICD-10-CM

## 2023-01-19 PROCEDURE — 99214 OFFICE O/P EST MOD 30 MIN: CPT | Performed by: NURSE PRACTITIONER

## 2023-01-19 NOTE — PROGRESS NOTES
[facility]             Jo Ann TOMAS Lua Darryl John, MD  Yamile Angel  1945 01/19/2023    Patient Active Problem List   Diagnosis   • Preoperative clearance   • Encounter for screening for malignant neoplasm of colon   • Sepsis with acute renal failure without septic shock, due to unspecified organism, unspecified acute renal failure type (HCC)   • HOCM (hypertrophic obstructive cardiomyopathy) (HCC)   • Paroxysmal atrial fibrillation (HCC)       Dear Cristofer Garcia MD:    Subjective     Chief Complaint   Patient presents with   • Hospital Follow Up Visit       HPI:     This is a 77 y.o. female with known past medical history of paroxysmal atrial fibrillation, essential hypertension, hyperlipidemia, COPD and recent diagnosis of hypertrophic obstructive cardiomyopathy.    Yamile Angel presents today for hospital follow-up.  Patient was recently admitted to [facility] from December 2022 to January 2023 for cute kidney injury, shock, acute small bowel ileus and acute hypoxic respiratory failure.  Cardiology was consulted on admission due to abnormal echocardiogram showed normal LV function with mild septal asymmetric hypertrophy, diastolic dysfunction and left ventricular intracavity gradient of 70 mmHg with a suspicion for HOCM.  Patient states since her discharge she has been doing overall well but continues to have weakness.  Denies any palpitations, shortness of breath or chest pain.  Her metoprolol was changed to metoprolol succinate during her admission and she states she has been unable to tolerate this secondary to nausea every time she takes it.    • Diagnostic Testing  1. Echocardiogram 1/2023: EF 61 to 65% with mild septal asymmetric hypertrophy with left ventricular intracavity gradient noted to be 70 mmHg which may represent dynamic proximal ventricular obstruction  2. Carotid ultrasound 1/2023: Retrograde flow in  the left vertebral artery with no evidence of hemodynamically significant plaques or stenosis     All other systems were reviewed and were negative.    Patient Active Problem List   Diagnosis   • Preoperative clearance   • Encounter for screening for malignant neoplasm of colon   • Sepsis with acute renal failure without septic shock, due to unspecified organism, unspecified acute renal failure type (HCC)   • HOCM (hypertrophic obstructive cardiomyopathy) (HCC)   • Paroxysmal atrial fibrillation (HCC)       family history is not on file.     reports that she has been smoking cigarettes. She has a 60.00 pack-year smoking history. She has never used smokeless tobacco. She reports that she does not drink alcohol and does not use drugs.    Allergies   Allergen Reactions   • Penicillins Rash   • Sulfa Antibiotics Rash         Current Outpatient Medications:   •  albuterol sulfate  (90 Base) MCG/ACT inhaler, Inhale 2 puffs by mouth every 4 (Four) Hours As Needed for Wheezing., Disp: 18 g, Rfl: 0  •  apixaban (ELIQUIS) 5 MG tablet tablet, Take 1 tablet by mouth 2 (Two) Times a Day. Indications: home medication, Disp: 60 tablet, Rfl: 0  •  atorvastatin (LIPITOR) 40 MG tablet, Take 1 tablet by mouth Every Night., Disp: 30 tablet, Rfl: 0  •  cyanocobalamin 1000 MCG/ML injection, Inject 1,000 mcg under the skin into the appropriate area as directed Every 30 (Thirty) Days., Disp: , Rfl:   •  doxycycline (MONODOX) 100 MG capsule, Take 1 capsule by mouth every 12 hours for 5 days as part of COPD Rescue Kit. (Only Start if in YELLOW ZONE.), Disp: 10 capsule, Rfl: 0  •  ferrous sulfate 325 (65 FE) MG tablet, Take 325 mg by mouth 2 (Two) Times a Day., Disp: , Rfl:   •  gabapentin (NEURONTIN) 600 MG tablet, Take 600 mg by mouth 3 (Three) Times a Day., Disp: , Rfl:   •  ipratropium-albuterol (DUO-NEB) 0.5-2.5 mg/3 ml nebulizer, Take 3 mL by neb every 30 minutes as needed for shortness of air for up to 6 doses. Part of COPD  Rescue Kit. (Only Start if in YELLOW ZONE.), Disp: 18 mL, Rfl: 0  •  oxyCODONE-acetaminophen (PERCOCET)  MG per tablet, Take 1 tablet by mouth 4 (Four) Times a Day., Disp: , Rfl:   •  predniSONE (DELTASONE) 20 MG tablet, Take 2 tablets by mouth daily for 5 days as part of COPD Rescue Kit. (Only Start if in YELLOW ZONE.), Disp: 10 tablet, Rfl: 0  •  traZODone (DESYREL) 50 MG tablet, Take 1/2 tablet by mouth Every Night., Disp: 15 tablet, Rfl: 0  •  ondansetron (ZOFRAN) 8 MG tablet, Take 8 mg by mouth 2 (Two) Times a Day As Needed for Nausea or Vomiting., Disp: , Rfl:       Physical Exam:  I have reviewed the patient's current vital signs as documented in the patient's EMR.   Vitals:    01/19/23 1317   BP: 131/79   Pulse: 87   SpO2: 97%     Body mass index is 23.14 kg/m².       01/19/23  1317   Weight: 67 kg (147 lb 12.8 oz)      Physical Exam  Constitutional:       General: She is not in acute distress.     Appearance: Normal appearance. She is well-developed and normal weight.   HENT:      Head: Normocephalic and atraumatic.   Eyes:      General: Lids are normal.      Conjunctiva/sclera: Conjunctivae normal.      Pupils: Pupils are equal, round, and reactive to light.   Neck:      Vascular: No carotid bruit or JVD.   Cardiovascular:      Rate and Rhythm: Normal rate and regular rhythm.      Pulses: Normal pulses.           Radial pulses are 2+ on the right side and 2+ on the left side.        Dorsalis pedis pulses are 2+ on the right side and 2+ on the left side.        Posterior tibial pulses are 2+ on the right side and 2+ on the left side.      Heart sounds: Normal heart sounds, S1 normal and S2 normal. No murmur heard.  Pulmonary:      Effort: Pulmonary effort is normal. No respiratory distress.      Breath sounds: Normal breath sounds. No wheezing.   Abdominal:      General: Bowel sounds are normal. There is no distension.      Palpations: Abdomen is soft. There is no hepatomegaly or splenomegaly.       Tenderness: There is no abdominal tenderness.   Musculoskeletal:         General: No swelling. Normal range of motion.      Cervical back: Normal range of motion and neck supple.      Right lower leg: No edema.      Left lower leg: No edema.   Skin:     General: Skin is warm and dry.      Coloration: Skin is not jaundiced.      Findings: No rash.   Neurological:      General: No focal deficit present.      Mental Status: She is alert and oriented to person, place, and time. Mental status is at baseline.   Psychiatric:         Mood and Affect: Mood normal.         Speech: Speech normal.         Behavior: Behavior normal.         Thought Content: Thought content normal.         Judgment: Judgment normal.            DATA REVIEWED:     TTE/HECTOR:  Results for orders placed during the hospital encounter of 12/31/22    Adult Transthoracic Echo Complete W/ Cont if Necessary Per Protocol    Interpretation Summary  •  Left ventricular ejection fraction appears to be 61 - 65%.  •  Left ventricular wall thickness is consistent with mild septal asymmetric hypertrophy.  •  Left ventricular diastolic function is consistent with (grade I) impaired relaxation.  •  Left ventricular intracavitary gradient noted to be 70 mmHg.  This may represent dynamic  proximal ventricular obstruction.  Clinical correlation suggested.      Laboratory evaluations:    Lab Results   Component Value Date    GLUCOSE 102 (H) 01/05/2023    BUN 7 (L) 01/05/2023    CREATININE 0.61 01/05/2023    BCR 11.5 01/05/2023    K 3.8 01/05/2023    CO2 24.3 01/05/2023    CALCIUM 7.6 (L) 01/05/2023    ALBUMIN 2.6 (L) 01/05/2023    AST 53 (H) 01/05/2023    ALT 27 01/05/2023     Lab Results   Component Value Date    WBC 9.06 01/05/2023    HGB 10.6 (L) 01/05/2023    HCT 33.0 (L) 01/05/2023    .5 (H) 01/05/2023     01/05/2023     Lab Results   Component Value Date    CHOL 109 12/31/2022    TRIG 127 12/31/2022    HDL 37 (L) 12/31/2022    LDL 49 12/31/2022     Lab  Results   Component Value Date    TSH 0.286 01/01/2023     No results found for: HGBA1C  Lab Results   Component Value Date    ALT 27 01/05/2023     No results found for: HGBA1C  Lab Results   Component Value Date    CREATININE 0.61 01/05/2023     No results found for: IRON, TIBC, FERRITIN  No results found for: INR, PROTIME        --------------------------------------------------------------------------------------------------------------------------    ASSESSMENT/PLAN:      Diagnosis Plan   1. HOCM (hypertrophic obstructive cardiomyopathy) ([facility])        2. Paroxysmal atrial fibrillation ([facility])            1. Paroxysmal atrial fibrillation  • Currently normal sinus rhythm.  Continue with Eliquis for stroke prevention.    2. HOCM  • Recent echocardiogram showed EF of 61 to 65% with left ventricular intracavitary gradient noted to be 70 mmHg.  Patient currently asymptomatic. Patient states her metoprolol heartrate was switched to metoprolol succinate during her inpatient admission and she has been unable to tolerate this secondary to severe nausea every time she takes it.  Given she is not able to tolerate it we will switch her back to metoprolol tartrate 25 mg p.o. twice daily.  Will work on titrating beta-blocker up as tolerated by blood pressure and heart rate and then plan on repeating echocardiogram to reevaluate.  • Can consider referral to  in the future.      This document has been @Electronically signed by TOMAS Rizzo, 01/19/23, 12:59 PM EST.       Dictated Utilizing Dragon Dictation: Part of this note may be an electronic transcription/translation of spoken language to printed text using the Dragon Dictation System.    Follow-up appointment and medication changes provided in hand delivered After Visit Summary as well as reviewed in the room.

## 2023-01-25 ENCOUNTER — TELEPHONE (OUTPATIENT)
Dept: SURGERY | Facility: CLINIC | Age: 78
End: 2023-01-25
Payer: MEDICARE

## 2023-01-25 NOTE — TELEPHONE ENCOUNTER
Per Dr. Langston called Miss Angel and told her that she does not need post op visit. Dr. Ivis au patient needs to see a vascular surgeon at . She saw her cardiologist on 1/19/23 and they have already told her this. I left a message for patient to follow their advice.

## 2023-01-26 ENCOUNTER — HOSPITAL ENCOUNTER (EMERGENCY)
Facility: HOSPITAL | Age: 78
Discharge: HOME | End: 2023-01-26
Attending: EMERGENCY MEDICINE | Admitting: EMERGENCY MEDICINE
Payer: MEDICARE

## 2023-01-26 ENCOUNTER — APPOINTMENT (OUTPATIENT)
Dept: ULTRASOUND IMAGING | Facility: HOSPITAL | Age: 78
End: 2023-01-26
Payer: MEDICARE

## 2023-01-26 ENCOUNTER — READMISSION MANAGEMENT (OUTPATIENT)
Dept: CALL CENTER | Facility: HOSPITAL | Age: 78
End: 2023-01-26
Payer: MEDICARE

## 2023-01-26 ENCOUNTER — APPOINTMENT (OUTPATIENT)
Dept: CT IMAGING | Facility: HOSPITAL | Age: 78
End: 2023-01-26
Payer: MEDICARE

## 2023-01-26 VITALS
BODY MASS INDEX: 23.07 KG/M2 | TEMPERATURE: 98.4 F | HEART RATE: 70 BPM | RESPIRATION RATE: 18 BRPM | OXYGEN SATURATION: 96 % | WEIGHT: 147 LBS | HEIGHT: 67 IN | SYSTOLIC BLOOD PRESSURE: 140 MMHG | DIASTOLIC BLOOD PRESSURE: 78 MMHG

## 2023-01-26 DIAGNOSIS — E87.6 HYPOKALEMIA: Primary | ICD-10-CM

## 2023-01-26 DIAGNOSIS — R10.9 ABDOMINAL PAIN, UNSPECIFIED ABDOMINAL LOCATION: ICD-10-CM

## 2023-01-26 LAB
ALBUMIN SERPL-MCNC: 3.6 G/DL (ref 3.5–5.2)
ALBUMIN/GLOB SERPL: 1.1 G/DL
ALP SERPL-CCNC: 197 U/L (ref 39–117)
ALT SERPL W P-5'-P-CCNC: 59 U/L (ref 1–33)
AMYLASE SERPL-CCNC: 54 U/L (ref 28–100)
ANION GAP SERPL CALCULATED.3IONS-SCNC: 9.9 MMOL/L (ref 5–15)
AST SERPL-CCNC: 238 U/L (ref 1–32)
BACTERIA UR QL AUTO: ABNORMAL /HPF
BASOPHILS # BLD AUTO: 0.03 10*3/MM3 (ref 0–0.2)
BASOPHILS NFR BLD AUTO: 0.3 % (ref 0–1.5)
BILIRUB SERPL-MCNC: 1.1 MG/DL (ref 0–1.2)
BILIRUB UR QL STRIP: NEGATIVE
BUN SERPL-MCNC: 13 MG/DL (ref 8–23)
BUN/CREAT SERPL: 21.3 (ref 7–25)
CALCIUM SPEC-SCNC: 8.3 MG/DL (ref 8.6–10.5)
CHLORIDE SERPL-SCNC: 101 MMOL/L (ref 98–107)
CLARITY UR: CLEAR
CO2 SERPL-SCNC: 26.1 MMOL/L (ref 22–29)
COLOR UR: YELLOW
CREAT SERPL-MCNC: 0.61 MG/DL (ref 0.57–1)
D-LACTATE SERPL-SCNC: 1.6 MMOL/L (ref 0.5–2)
DEPRECATED RDW RBC AUTO: 57.3 FL (ref 37–54)
EGFRCR SERPLBLD CKD-EPI 2021: 92.2 ML/MIN/1.73
EOSINOPHIL # BLD AUTO: 0.2 10*3/MM3 (ref 0–0.4)
EOSINOPHIL NFR BLD AUTO: 1.8 % (ref 0.3–6.2)
ERYTHROCYTE [DISTWIDTH] IN BLOOD BY AUTOMATED COUNT: 14.8 % (ref 12.3–15.4)
FLUAV RNA RESP QL NAA+PROBE: NOT DETECTED
FLUBV RNA RESP QL NAA+PROBE: NOT DETECTED
GLOBULIN UR ELPH-MCNC: 3.2 GM/DL
GLUCOSE SERPL-MCNC: 120 MG/DL (ref 65–99)
GLUCOSE UR STRIP-MCNC: NEGATIVE MG/DL
HCT VFR BLD AUTO: 34.3 % (ref 34–46.6)
HGB BLD-MCNC: 10.9 G/DL (ref 12–15.9)
HGB UR QL STRIP.AUTO: ABNORMAL
HOLD SPECIMEN: NORMAL
HOLD SPECIMEN: NORMAL
HYALINE CASTS UR QL AUTO: ABNORMAL /LPF
IMM GRANULOCYTES # BLD AUTO: 0.02 10*3/MM3 (ref 0–0.05)
IMM GRANULOCYTES NFR BLD AUTO: 0.2 % (ref 0–0.5)
KETONES UR QL STRIP: NEGATIVE
LEUKOCYTE ESTERASE UR QL STRIP.AUTO: ABNORMAL
LIPASE SERPL-CCNC: 24 U/L (ref 13–60)
LYMPHOCYTES # BLD AUTO: 2.99 10*3/MM3 (ref 0.7–3.1)
LYMPHOCYTES NFR BLD AUTO: 26.7 % (ref 19.6–45.3)
MAGNESIUM SERPL-MCNC: 1.8 MG/DL (ref 1.6–2.4)
MCH RBC QN AUTO: 33.1 PG (ref 26.6–33)
MCHC RBC AUTO-ENTMCNC: 31.8 G/DL (ref 31.5–35.7)
MCV RBC AUTO: 104.3 FL (ref 79–97)
MONOCYTES # BLD AUTO: 0.41 10*3/MM3 (ref 0.1–0.9)
MONOCYTES NFR BLD AUTO: 3.7 % (ref 5–12)
NEUTROPHILS NFR BLD AUTO: 67.3 % (ref 42.7–76)
NEUTROPHILS NFR BLD AUTO: 7.55 10*3/MM3 (ref 1.7–7)
NITRITE UR QL STRIP: NEGATIVE
NRBC BLD AUTO-RTO: 0 /100 WBC (ref 0–0.2)
NT-PROBNP SERPL-MCNC: 4087 PG/ML (ref 0–1800)
PH UR STRIP.AUTO: 6.5 [PH] (ref 5–8)
PLATELET # BLD AUTO: 181 10*3/MM3 (ref 140–450)
PMV BLD AUTO: 10.1 FL (ref 6–12)
POTASSIUM SERPL-SCNC: 2.7 MMOL/L (ref 3.5–5.2)
POTASSIUM SERPL-SCNC: 2.7 MMOL/L (ref 3.5–5.2)
POTASSIUM SERPL-SCNC: 3.3 MMOL/L (ref 3.5–5.2)
PROT SERPL-MCNC: 6.8 G/DL (ref 6–8.5)
PROT UR QL STRIP: ABNORMAL
RBC # BLD AUTO: 3.29 10*6/MM3 (ref 3.77–5.28)
RBC # UR STRIP: ABNORMAL /HPF
REF LAB TEST METHOD: ABNORMAL
SARS-COV-2 RNA RESP QL NAA+PROBE: NOT DETECTED
SODIUM SERPL-SCNC: 137 MMOL/L (ref 136–145)
SP GR UR STRIP: 1.01 (ref 1–1.03)
SQUAMOUS #/AREA URNS HPF: ABNORMAL /HPF
TROPONIN T SERPL-MCNC: <0.01 NG/ML (ref 0–0.03)
TROPONIN T SERPL-MCNC: <0.01 NG/ML (ref 0–0.03)
UROBILINOGEN UR QL STRIP: ABNORMAL
WBC # UR STRIP: ABNORMAL /HPF
WBC NRBC COR # BLD: 11.2 10*3/MM3 (ref 3.4–10.8)
WHOLE BLOOD HOLD COAG: NORMAL
WHOLE BLOOD HOLD SPECIMEN: NORMAL

## 2023-01-26 PROCEDURE — 74176 CT ABD & PELVIS W/O CONTRAST: CPT | Performed by: RADIOLOGY

## 2023-01-26 PROCEDURE — 93005 ELECTROCARDIOGRAM TRACING: CPT | Performed by: EMERGENCY MEDICINE

## 2023-01-26 PROCEDURE — 76705 ECHO EXAM OF ABDOMEN: CPT

## 2023-01-26 PROCEDURE — 80053 COMPREHEN METABOLIC PANEL: CPT | Performed by: NURSE PRACTITIONER

## 2023-01-26 PROCEDURE — 84132 ASSAY OF SERUM POTASSIUM: CPT | Performed by: NURSE PRACTITIONER

## 2023-01-26 PROCEDURE — 83735 ASSAY OF MAGNESIUM: CPT | Performed by: NURSE PRACTITIONER

## 2023-01-26 PROCEDURE — 99284 EMERGENCY DEPT VISIT MOD MDM: CPT

## 2023-01-26 PROCEDURE — 93010 ELECTROCARDIOGRAM REPORT: CPT | Performed by: INTERNAL MEDICINE

## 2023-01-26 PROCEDURE — 83605 ASSAY OF LACTIC ACID: CPT | Performed by: NURSE PRACTITIONER

## 2023-01-26 PROCEDURE — 96365 THER/PROPH/DIAG IV INF INIT: CPT

## 2023-01-26 PROCEDURE — 83690 ASSAY OF LIPASE: CPT | Performed by: NURSE PRACTITIONER

## 2023-01-26 PROCEDURE — 81001 URINALYSIS AUTO W/SCOPE: CPT | Performed by: NURSE PRACTITIONER

## 2023-01-26 PROCEDURE — 96366 THER/PROPH/DIAG IV INF ADDON: CPT

## 2023-01-26 PROCEDURE — 87040 BLOOD CULTURE FOR BACTERIA: CPT | Performed by: NURSE PRACTITIONER

## 2023-01-26 PROCEDURE — 82150 ASSAY OF AMYLASE: CPT | Performed by: NURSE PRACTITIONER

## 2023-01-26 PROCEDURE — 83880 ASSAY OF NATRIURETIC PEPTIDE: CPT | Performed by: NURSE PRACTITIONER

## 2023-01-26 PROCEDURE — 84132 ASSAY OF SERUM POTASSIUM: CPT | Performed by: STUDENT IN AN ORGANIZED HEALTH CARE EDUCATION/TRAINING PROGRAM

## 2023-01-26 PROCEDURE — 85025 COMPLETE CBC W/AUTO DIFF WBC: CPT | Performed by: NURSE PRACTITIONER

## 2023-01-26 PROCEDURE — 74176 CT ABD & PELVIS W/O CONTRAST: CPT

## 2023-01-26 PROCEDURE — 87636 SARSCOV2 & INF A&B AMP PRB: CPT | Performed by: NURSE PRACTITIONER

## 2023-01-26 PROCEDURE — 84484 ASSAY OF TROPONIN QUANT: CPT | Performed by: NURSE PRACTITIONER

## 2023-01-26 PROCEDURE — 0 POTASSIUM CHLORIDE 10 MEQ/100ML SOLUTION: Performed by: NURSE PRACTITIONER

## 2023-01-26 PROCEDURE — 36415 COLL VENOUS BLD VENIPUNCTURE: CPT

## 2023-01-26 RX ORDER — POTASSIUM CHLORIDE 7.45 MG/ML
10 INJECTION INTRAVENOUS ONCE
Status: COMPLETED | OUTPATIENT
Start: 2023-01-26 | End: 2023-01-26

## 2023-01-26 RX ORDER — POTASSIUM CHLORIDE 20 MEQ/1
40 TABLET, EXTENDED RELEASE ORAL ONCE
Status: COMPLETED | OUTPATIENT
Start: 2023-01-26 | End: 2023-01-26

## 2023-01-26 RX ORDER — POTASSIUM CHLORIDE 750 MG/1
10 TABLET, FILM COATED, EXTENDED RELEASE ORAL 2 TIMES DAILY
Qty: 30 TABLET | Refills: 0 | Status: ON HOLD | OUTPATIENT
Start: 2023-01-26 | End: 2023-02-13

## 2023-01-26 RX ORDER — SODIUM CHLORIDE 9 MG/ML
100 INJECTION, SOLUTION INTRAVENOUS CONTINUOUS
Status: DISCONTINUED | OUTPATIENT
Start: 2023-01-26 | End: 2023-01-27 | Stop reason: HOSPADM

## 2023-01-26 RX ADMIN — POTASSIUM CHLORIDE 40 MEQ: 1500 TABLET, EXTENDED RELEASE ORAL at 18:25

## 2023-01-26 RX ADMIN — POTASSIUM CHLORIDE 10 MEQ: 7.46 INJECTION, SOLUTION INTRAVENOUS at 19:29

## 2023-01-26 RX ADMIN — POTASSIUM CHLORIDE 10 MEQ: 7.46 INJECTION, SOLUTION INTRAVENOUS at 20:46

## 2023-01-26 RX ADMIN — SODIUM CHLORIDE 100 ML/HR: 9 INJECTION, SOLUTION INTRAVENOUS at 19:38

## 2023-01-26 NOTE — OUTREACH NOTE
Sepsis Week 3 Survey    Flowsheet Row Responses   [facility]? [facility]   Does the patient have one of the following disease processes/diagnoses(primary or secondary)? Sepsis   Week 3 attempt successful? Yes   Call start time 1258   Call end time 1300   Discharge diagnosis Sepsis   Meds reviewed with patient/caregiver? Yes   Is the patient taking all medications as directed (includes completed medication regime)? Yes   Does the patient have a primary care provider?  Yes   Has the patient kept scheduled appointments due by today? Yes   Psychosocial issues? No   Did the patient receive a copy of their discharge instructions? Yes   What is the patient's perception of their health status since discharge? Improving   Nursing interventions Nurse provided patient education   Is the patient/caregiver able to teach back TIME? T emperature - higher or lower than normal, I nfection - may have signs and symptoms of an infection, M ental Decline - confused, sleepy, difficult to arouse, E xtremely Ill - severe pain, discomfort, shortness of breath   Nursing interventions Nurse provided patient education   Is patient/caregiver able to teach back steps to recovery at home? Eat a balanced diet   Is the patient/caregiver able to teach back signs and symptoms of worsening condition: Fever, Hyperthermia, Shortness of breath/rapid respiratory rate, Altered mental status(confusion/coma)   Is the patient/caregiver able to teach back the hierarchy of who to call/visit for symptoms/problems? PCP, Specialist, Home health nurse, Urgent Care, ED, 911 Yes   Week 3 call completed? Yes   Wrap up additional comments Pt reports she has completed her follow ups and takes meds as ordered. Pt reports that she has improved.           WU OLSON - Registered Nurse

## 2023-01-29 LAB
QT INTERVAL: 400 MS
QTC INTERVAL: 481 MS

## 2023-01-31 LAB
BACTERIA SPEC AEROBE CULT: NORMAL
BACTERIA SPEC AEROBE CULT: NORMAL

## 2023-02-07 ENCOUNTER — TELEPHONE (OUTPATIENT)
Dept: CARDIOLOGY | Facility: CLINIC | Age: 78
End: 2023-02-07
Payer: MEDICARE

## 2023-02-07 DIAGNOSIS — I42.1 HOCM (HYPERTROPHIC OBSTRUCTIVE CARDIOMYOPATHY): Primary | ICD-10-CM

## 2023-02-07 NOTE — TELEPHONE ENCOUNTER
Called said that her Metoprolol Tartrate 25 mg is making her sick and cant take it is there something different to take

## 2023-02-11 ENCOUNTER — HOSPITAL ENCOUNTER (EMERGENCY)
Facility: HOSPITAL | Age: 78
Discharge: ANOTHER HOSPITAL | DRG: 444 | End: 2023-02-11
Attending: STUDENT IN AN ORGANIZED HEALTH CARE EDUCATION/TRAINING PROGRAM | Admitting: STUDENT IN AN ORGANIZED HEALTH CARE EDUCATION/TRAINING PROGRAM
Payer: MEDICARE

## 2023-02-11 ENCOUNTER — APPOINTMENT (OUTPATIENT)
Dept: CT IMAGING | Facility: HOSPITAL | Age: 78
DRG: 444 | End: 2023-02-11
Payer: MEDICARE

## 2023-02-11 ENCOUNTER — APPOINTMENT (OUTPATIENT)
Dept: GENERAL RADIOLOGY | Facility: HOSPITAL | Age: 78
DRG: 444 | End: 2023-02-11
Payer: MEDICARE

## 2023-02-11 ENCOUNTER — HOSPITAL ENCOUNTER (INPATIENT)
Facility: HOSPITAL | Age: 78
LOS: 1 days | Discharge: HOME | DRG: 444 | End: 2023-02-13
Attending: STUDENT IN AN ORGANIZED HEALTH CARE EDUCATION/TRAINING PROGRAM | Admitting: INTERNAL MEDICINE
Payer: MEDICARE

## 2023-02-11 ENCOUNTER — APPOINTMENT (OUTPATIENT)
Dept: MRI IMAGING | Facility: HOSPITAL | Age: 78
DRG: 444 | End: 2023-02-11
Payer: MEDICARE

## 2023-02-11 VITALS
RESPIRATION RATE: 18 BRPM | TEMPERATURE: 98.4 F | HEART RATE: 96 BPM | SYSTOLIC BLOOD PRESSURE: 133 MMHG | OXYGEN SATURATION: 98 % | WEIGHT: 147 LBS | HEIGHT: 67 IN | DIASTOLIC BLOOD PRESSURE: 81 MMHG | BODY MASS INDEX: 23.07 KG/M2

## 2023-02-11 DIAGNOSIS — K25.9 MULTIPLE GASTRIC ULCERS: ICD-10-CM

## 2023-02-11 DIAGNOSIS — A41.9 SEPSIS, DUE TO UNSPECIFIED ORGANISM, UNSPECIFIED WHETHER ACUTE ORGAN DYSFUNCTION PRESENT: ICD-10-CM

## 2023-02-11 DIAGNOSIS — K80.33 CALCULUS OF BILE DUCT WITH ACUTE CHOLANGITIS WITH OBSTRUCTION: Primary | ICD-10-CM

## 2023-02-11 DIAGNOSIS — K80.50 CHOLEDOCHOLITHIASIS: Primary | ICD-10-CM

## 2023-02-11 PROBLEM — K55.1 MESENTERIC ISCHEMIA, CHRONIC: Status: ACTIVE | Noted: 2023-02-11

## 2023-02-11 PROBLEM — I10 HTN (HYPERTENSION): Status: ACTIVE | Noted: 2023-02-11

## 2023-02-11 PROBLEM — D72.829 LEUKOCYTOSIS: Status: ACTIVE | Noted: 2023-02-11

## 2023-02-11 PROBLEM — J44.9 COPD (CHRONIC OBSTRUCTIVE PULMONARY DISEASE): Status: ACTIVE | Noted: 2023-02-11

## 2023-02-11 PROBLEM — E78.5 HLD (HYPERLIPIDEMIA): Status: ACTIVE | Noted: 2023-02-11

## 2023-02-11 PROBLEM — Z72.0 TOBACCO ABUSE: Status: ACTIVE | Noted: 2023-02-11

## 2023-02-11 LAB
A-A DO2: 45.1 MMHG (ref 0–300)
ALBUMIN SERPL-MCNC: 3.9 G/DL (ref 3.5–5.2)
ALBUMIN/GLOB SERPL: 1 G/DL
ALP SERPL-CCNC: 141 U/L (ref 39–117)
ALT SERPL W P-5'-P-CCNC: 8 U/L (ref 1–33)
AMYLASE SERPL-CCNC: 44 U/L (ref 28–100)
ANION GAP SERPL CALCULATED.3IONS-SCNC: 13.5 MMOL/L (ref 5–15)
ARTERIAL PATENCY WRIST A: POSITIVE
AST SERPL-CCNC: 20 U/L (ref 1–32)
ATMOSPHERIC PRESS: 734 MMHG
BACTERIA UR QL AUTO: ABNORMAL /HPF
BASE EXCESS BLDA CALC-SCNC: 2.2 MMOL/L (ref 0–2)
BDY SITE: ABNORMAL
BILIRUB SERPL-MCNC: 0.7 MG/DL (ref 0–1.2)
BILIRUB UR QL STRIP: NEGATIVE
BODY TEMPERATURE: 0 C
BUN SERPL-MCNC: 21 MG/DL (ref 8–23)
BUN/CREAT SERPL: 27.3 (ref 7–25)
CALCIUM SPEC-SCNC: 9 MG/DL (ref 8.6–10.5)
CHLORIDE SERPL-SCNC: 96 MMOL/L (ref 98–107)
CLARITY UR: CLEAR
CO2 BLDA-SCNC: 28.6 MMOL/L (ref 22–33)
CO2 SERPL-SCNC: 24.5 MMOL/L (ref 22–29)
COHGB MFR BLD: 4.4 % (ref 0–5)
COLOR UR: YELLOW
CREAT SERPL-MCNC: 0.77 MG/DL (ref 0.57–1)
CRP SERPL-MCNC: 12.77 MG/DL (ref 0–0.5)
D-LACTATE SERPL-SCNC: 0.9 MMOL/L (ref 0.5–2)
D-LACTATE SERPL-SCNC: 2.6 MMOL/L (ref 0.5–2)
DEPRECATED RDW RBC AUTO: 57.3 FL (ref 37–54)
EGFRCR SERPLBLD CKD-EPI 2021: 79.1 ML/MIN/1.73
EOSINOPHIL # BLD MANUAL: 3.06 10*3/MM3 (ref 0–0.4)
EOSINOPHIL NFR BLD MANUAL: 13 % (ref 0.3–6.2)
ERYTHROCYTE [DISTWIDTH] IN BLOOD BY AUTOMATED COUNT: 14.6 % (ref 12.3–15.4)
ERYTHROCYTE [SEDIMENTATION RATE] IN BLOOD: 87 MM/HR (ref 0–30)
FLUAV RNA RESP QL NAA+PROBE: NOT DETECTED
FLUBV RNA RESP QL NAA+PROBE: NOT DETECTED
GEN 5 2HR TROPONIN T REFLEX: 19 NG/L
GLOBULIN UR ELPH-MCNC: 4.1 GM/DL
GLUCOSE SERPL-MCNC: 128 MG/DL (ref 65–99)
GLUCOSE UR STRIP-MCNC: NEGATIVE MG/DL
HCO3 BLDA-SCNC: 27.3 MMOL/L (ref 20–26)
HCT VFR BLD AUTO: 40.1 % (ref 34–46.6)
HCT VFR BLD CALC: 36.2 % (ref 38–51)
HGB BLD-MCNC: 12.5 G/DL (ref 12–15.9)
HGB BLDA-MCNC: 11.8 G/DL (ref 13.5–17.5)
HGB UR QL STRIP.AUTO: NEGATIVE
HOLD SPECIMEN: NORMAL
HOLD SPECIMEN: NORMAL
HYALINE CASTS UR QL AUTO: ABNORMAL /LPF
INHALED O2 CONCENTRATION: 21 %
KETONES UR QL STRIP: NEGATIVE
LEUKOCYTE ESTERASE UR QL STRIP.AUTO: ABNORMAL
LIPASE SERPL-CCNC: 20 U/L (ref 13–60)
LYMPHOCYTES # BLD MANUAL: 4.48 10*3/MM3 (ref 0.7–3.1)
LYMPHOCYTES NFR BLD MANUAL: 5 % (ref 5–12)
Lab: ABNORMAL
Lab: ABNORMAL
MACROCYTES BLD QL SMEAR: ABNORMAL
MAGNESIUM SERPL-MCNC: 1.9 MG/DL (ref 1.6–2.4)
MCH RBC QN AUTO: 32.9 PG (ref 26.6–33)
MCHC RBC AUTO-ENTMCNC: 31.2 G/DL (ref 31.5–35.7)
MCV RBC AUTO: 105.5 FL (ref 79–97)
METHGB BLD QL: 0 % (ref 0–3)
MODALITY: ABNORMAL
MONOCYTES # BLD: 1.18 10*3/MM3 (ref 0.1–0.9)
NEUTROPHILS # BLD AUTO: 14.85 10*3/MM3 (ref 1.7–7)
NEUTROPHILS NFR BLD MANUAL: 63 % (ref 42.7–76)
NITRITE UR QL STRIP: NEGATIVE
NOTE: ABNORMAL
NOTIFIED BY: ABNORMAL
NOTIFIED WHO: ABNORMAL
OXYHGB MFR BLDV: 82.2 % (ref 94–99)
PCO2 BLDA: 43.3 MM HG (ref 35–45)
PCO2 TEMP ADJ BLD: ABNORMAL MM[HG]
PH BLDA: 7.41 PH UNITS (ref 7.35–7.45)
PH UR STRIP.AUTO: 5.5 [PH] (ref 5–8)
PH, TEMP CORRECTED: ABNORMAL
PLAT MORPH BLD: NORMAL
PLATELET # BLD AUTO: 347 10*3/MM3 (ref 140–450)
PMV BLD AUTO: 9.6 FL (ref 6–12)
PO2 BLDA: 50.2 MM HG (ref 83–108)
PO2 TEMP ADJ BLD: ABNORMAL MM[HG]
POTASSIUM SERPL-SCNC: 3.9 MMOL/L (ref 3.5–5.2)
PROCALCITONIN SERPL-MCNC: 0.21 NG/ML (ref 0–0.25)
PROT SERPL-MCNC: 8 G/DL (ref 6–8.5)
PROT UR QL STRIP: NEGATIVE
QT INTERVAL: 366 MS
QTC INTERVAL: 479 MS
RBC # BLD AUTO: 3.8 10*6/MM3 (ref 3.77–5.28)
RBC # UR STRIP: ABNORMAL /HPF
REF LAB TEST METHOD: ABNORMAL
SAO2 % BLDCOA: 86 % (ref 94–99)
SARS-COV-2 RNA RESP QL NAA+PROBE: NOT DETECTED
SCAN SLIDE: NORMAL
SODIUM SERPL-SCNC: 134 MMOL/L (ref 136–145)
SP GR UR STRIP: >1.03 (ref 1–1.03)
SQUAMOUS #/AREA URNS HPF: ABNORMAL /HPF
TROPONIN T DELTA: -2 NG/L
TROPONIN T SERPL HS-MCNC: 21 NG/L
UROBILINOGEN UR QL STRIP: ABNORMAL
VARIANT LYMPHS NFR BLD MANUAL: 19 % (ref 19.6–45.3)
VENTILATOR MODE: ABNORMAL
WBC # UR STRIP: ABNORMAL /HPF
WBC NRBC COR # BLD: 23.57 10*3/MM3 (ref 3.4–10.8)
WHOLE BLOOD HOLD COAG: NORMAL
WHOLE BLOOD HOLD SPECIMEN: NORMAL

## 2023-02-11 PROCEDURE — 25010000002 IOPAMIDOL 61 % SOLUTION: Performed by: STUDENT IN AN ORGANIZED HEALTH CARE EDUCATION/TRAINING PROGRAM

## 2023-02-11 PROCEDURE — 25010000002 CEFEPIME PER 500 MG: Performed by: PHYSICIAN ASSISTANT

## 2023-02-11 PROCEDURE — 25010000002 MORPHINE PER 10 MG: Performed by: STUDENT IN AN ORGANIZED HEALTH CARE EDUCATION/TRAINING PROGRAM

## 2023-02-11 PROCEDURE — 74181 MRI ABDOMEN W/O CONTRAST: CPT

## 2023-02-11 PROCEDURE — 84484 ASSAY OF TROPONIN QUANT: CPT | Performed by: PHYSICIAN ASSISTANT

## 2023-02-11 PROCEDURE — 94761 N-INVAS EAR/PLS OXIMETRY MLT: CPT

## 2023-02-11 PROCEDURE — 25010000002 ONDANSETRON PER 1 MG: Performed by: PHYSICIAN ASSISTANT

## 2023-02-11 PROCEDURE — 96376 TX/PRO/DX INJ SAME DRUG ADON: CPT

## 2023-02-11 PROCEDURE — 86140 C-REACTIVE PROTEIN: CPT | Performed by: PHYSICIAN ASSISTANT

## 2023-02-11 PROCEDURE — G0378 HOSPITAL OBSERVATION PER HR: HCPCS

## 2023-02-11 PROCEDURE — 25010000002 LORAZEPAM PER 2 MG: Performed by: STUDENT IN AN ORGANIZED HEALTH CARE EDUCATION/TRAINING PROGRAM

## 2023-02-11 PROCEDURE — 96375 TX/PRO/DX INJ NEW DRUG ADDON: CPT

## 2023-02-11 PROCEDURE — 36415 COLL VENOUS BLD VENIPUNCTURE: CPT

## 2023-02-11 PROCEDURE — 83690 ASSAY OF LIPASE: CPT | Performed by: PHYSICIAN ASSISTANT

## 2023-02-11 PROCEDURE — 85652 RBC SED RATE AUTOMATED: CPT | Performed by: PHYSICIAN ASSISTANT

## 2023-02-11 PROCEDURE — 99285 EMERGENCY DEPT VISIT HI MDM: CPT

## 2023-02-11 PROCEDURE — 87040 BLOOD CULTURE FOR BACTERIA: CPT | Performed by: PHYSICIAN ASSISTANT

## 2023-02-11 PROCEDURE — 96367 TX/PROPH/DG ADDL SEQ IV INF: CPT

## 2023-02-11 PROCEDURE — 82805 BLOOD GASES W/O2 SATURATION: CPT

## 2023-02-11 PROCEDURE — 83605 ASSAY OF LACTIC ACID: CPT | Performed by: PHYSICIAN ASSISTANT

## 2023-02-11 PROCEDURE — 82150 ASSAY OF AMYLASE: CPT | Performed by: PHYSICIAN ASSISTANT

## 2023-02-11 PROCEDURE — 93005 ELECTROCARDIOGRAM TRACING: CPT | Performed by: PHYSICIAN ASSISTANT

## 2023-02-11 PROCEDURE — 85007 BL SMEAR W/DIFF WBC COUNT: CPT | Performed by: PHYSICIAN ASSISTANT

## 2023-02-11 PROCEDURE — 96365 THER/PROPH/DIAG IV INF INIT: CPT

## 2023-02-11 PROCEDURE — 80053 COMPREHEN METABOLIC PANEL: CPT | Performed by: PHYSICIAN ASSISTANT

## 2023-02-11 PROCEDURE — 94799 UNLISTED PULMONARY SVC/PX: CPT

## 2023-02-11 PROCEDURE — 82375 ASSAY CARBOXYHB QUANT: CPT

## 2023-02-11 PROCEDURE — 87636 SARSCOV2 & INF A&B AMP PRB: CPT | Performed by: PHYSICIAN ASSISTANT

## 2023-02-11 PROCEDURE — 74177 CT ABD & PELVIS W/CONTRAST: CPT

## 2023-02-11 PROCEDURE — 71045 X-RAY EXAM CHEST 1 VIEW: CPT

## 2023-02-11 PROCEDURE — 84145 PROCALCITONIN (PCT): CPT | Performed by: PHYSICIAN ASSISTANT

## 2023-02-11 PROCEDURE — 93010 ELECTROCARDIOGRAM REPORT: CPT | Performed by: SPECIALIST

## 2023-02-11 PROCEDURE — 83735 ASSAY OF MAGNESIUM: CPT | Performed by: PHYSICIAN ASSISTANT

## 2023-02-11 PROCEDURE — 71275 CT ANGIOGRAPHY CHEST: CPT

## 2023-02-11 PROCEDURE — 96361 HYDRATE IV INFUSION ADD-ON: CPT

## 2023-02-11 PROCEDURE — 83050 HGB METHEMOGLOBIN QUAN: CPT

## 2023-02-11 PROCEDURE — 81001 URINALYSIS AUTO W/SCOPE: CPT | Performed by: PHYSICIAN ASSISTANT

## 2023-02-11 PROCEDURE — 36600 WITHDRAWAL OF ARTERIAL BLOOD: CPT

## 2023-02-11 PROCEDURE — 85025 COMPLETE CBC W/AUTO DIFF WBC: CPT | Performed by: PHYSICIAN ASSISTANT

## 2023-02-11 RX ORDER — ACETAMINOPHEN 650 MG/1
650 SUPPOSITORY RECTAL EVERY 4 HOURS PRN
Status: DISCONTINUED | OUTPATIENT
Start: 2023-02-11 | End: 2023-02-13 | Stop reason: HOSPADM

## 2023-02-11 RX ORDER — ONDANSETRON 2 MG/ML
4 INJECTION INTRAMUSCULAR; INTRAVENOUS EVERY 6 HOURS PRN
Status: DISCONTINUED | OUTPATIENT
Start: 2023-02-11 | End: 2023-02-13 | Stop reason: HOSPADM

## 2023-02-11 RX ORDER — TRAZODONE HYDROCHLORIDE 50 MG/1
25 TABLET ORAL NIGHTLY
Status: DISCONTINUED | OUTPATIENT
Start: 2023-02-12 | End: 2023-02-13 | Stop reason: HOSPADM

## 2023-02-11 RX ORDER — SODIUM CHLORIDE 9 MG/ML
125 INJECTION, SOLUTION INTRAVENOUS CONTINUOUS
Status: DISCONTINUED | OUTPATIENT
Start: 2023-02-11 | End: 2023-02-11 | Stop reason: HOSPADM

## 2023-02-11 RX ORDER — FERROUS SULFATE 325(65) MG
325 TABLET ORAL 2 TIMES DAILY
Status: DISCONTINUED | OUTPATIENT
Start: 2023-02-12 | End: 2023-02-13 | Stop reason: HOSPADM

## 2023-02-11 RX ORDER — SODIUM CHLORIDE 9 MG/ML
75 INJECTION, SOLUTION INTRAVENOUS CONTINUOUS
Status: ACTIVE | OUTPATIENT
Start: 2023-02-12 | End: 2023-02-12

## 2023-02-11 RX ORDER — IPRATROPIUM BROMIDE AND ALBUTEROL SULFATE 2.5; .5 MG/3ML; MG/3ML
1.5 SOLUTION RESPIRATORY (INHALATION)
Status: DISCONTINUED | OUTPATIENT
Start: 2023-02-12 | End: 2023-02-13 | Stop reason: HOSPADM

## 2023-02-11 RX ORDER — METRONIDAZOLE 500 MG/100ML
500 INJECTION, SOLUTION INTRAVENOUS ONCE
Status: COMPLETED | OUTPATIENT
Start: 2023-02-11 | End: 2023-02-11

## 2023-02-11 RX ORDER — SODIUM CHLORIDE 0.9 % (FLUSH) 0.9 %
10 SYRINGE (ML) INJECTION AS NEEDED
Status: DISCONTINUED | OUTPATIENT
Start: 2023-02-11 | End: 2023-02-11 | Stop reason: HOSPADM

## 2023-02-11 RX ORDER — ONDANSETRON 4 MG/1
4 TABLET, FILM COATED ORAL EVERY 6 HOURS PRN
Status: DISCONTINUED | OUTPATIENT
Start: 2023-02-11 | End: 2023-02-13 | Stop reason: HOSPADM

## 2023-02-11 RX ORDER — MORPHINE SULFATE 2 MG/ML
2 INJECTION, SOLUTION INTRAMUSCULAR; INTRAVENOUS ONCE
Status: COMPLETED | OUTPATIENT
Start: 2023-02-11 | End: 2023-02-11

## 2023-02-11 RX ORDER — ONDANSETRON 2 MG/ML
4 INJECTION INTRAMUSCULAR; INTRAVENOUS ONCE
Status: COMPLETED | OUTPATIENT
Start: 2023-02-11 | End: 2023-02-11

## 2023-02-11 RX ORDER — ACETAMINOPHEN 325 MG/1
650 TABLET ORAL EVERY 4 HOURS PRN
Status: DISCONTINUED | OUTPATIENT
Start: 2023-02-11 | End: 2023-02-13 | Stop reason: HOSPADM

## 2023-02-11 RX ORDER — LORAZEPAM 2 MG/ML
0.5 INJECTION INTRAMUSCULAR ONCE
Status: COMPLETED | OUTPATIENT
Start: 2023-02-11 | End: 2023-02-11

## 2023-02-11 RX ORDER — ATORVASTATIN CALCIUM 40 MG/1
40 TABLET, FILM COATED ORAL NIGHTLY
Status: DISCONTINUED | OUTPATIENT
Start: 2023-02-12 | End: 2023-02-13 | Stop reason: HOSPADM

## 2023-02-11 RX ORDER — ACETAMINOPHEN 160 MG/5ML
650 SOLUTION ORAL EVERY 4 HOURS PRN
Status: DISCONTINUED | OUTPATIENT
Start: 2023-02-11 | End: 2023-02-13 | Stop reason: HOSPADM

## 2023-02-11 RX ORDER — SODIUM CHLORIDE 0.9 % (FLUSH) 0.9 %
10 SYRINGE (ML) INJECTION EVERY 12 HOURS SCHEDULED
Status: DISCONTINUED | OUTPATIENT
Start: 2023-02-12 | End: 2023-02-13 | Stop reason: HOSPADM

## 2023-02-11 RX ORDER — SODIUM CHLORIDE 9 MG/ML
40 INJECTION, SOLUTION INTRAVENOUS AS NEEDED
Status: DISCONTINUED | OUTPATIENT
Start: 2023-02-11 | End: 2023-02-13 | Stop reason: HOSPADM

## 2023-02-11 RX ORDER — SODIUM CHLORIDE 0.9 % (FLUSH) 0.9 %
10 SYRINGE (ML) INJECTION AS NEEDED
Status: DISCONTINUED | OUTPATIENT
Start: 2023-02-11 | End: 2023-02-13 | Stop reason: HOSPADM

## 2023-02-11 RX ORDER — HYDROMORPHONE HYDROCHLORIDE 1 MG/ML
0.5 INJECTION, SOLUTION INTRAMUSCULAR; INTRAVENOUS; SUBCUTANEOUS
Status: DISCONTINUED | OUTPATIENT
Start: 2023-02-11 | End: 2023-02-13 | Stop reason: HOSPADM

## 2023-02-11 RX ADMIN — SODIUM CHLORIDE 1000 ML: 9 INJECTION, SOLUTION INTRAVENOUS at 12:50

## 2023-02-11 RX ADMIN — CEFEPIME 2 G: 2 INJECTION, POWDER, FOR SOLUTION INTRAVENOUS at 13:00

## 2023-02-11 RX ADMIN — MORPHINE SULFATE 2 MG: 2 INJECTION, SOLUTION INTRAMUSCULAR; INTRAVENOUS at 17:53

## 2023-02-11 RX ADMIN — LORAZEPAM 0.5 MG: 2 INJECTION INTRAMUSCULAR; INTRAVENOUS at 15:40

## 2023-02-11 RX ADMIN — METRONIDAZOLE 500 MG: 500 INJECTION, SOLUTION INTRAVENOUS at 13:48

## 2023-02-11 RX ADMIN — SODIUM CHLORIDE 125 ML/HR: 9 INJECTION, SOLUTION INTRAVENOUS at 17:08

## 2023-02-11 RX ADMIN — IOPAMIDOL 70 ML: 612 INJECTION, SOLUTION INTRAVENOUS at 13:43

## 2023-02-11 RX ADMIN — ONDANSETRON 4 MG: 2 INJECTION INTRAMUSCULAR; INTRAVENOUS at 13:08

## 2023-02-11 RX ADMIN — MORPHINE SULFATE 2 MG: 2 INJECTION, SOLUTION INTRAMUSCULAR; INTRAVENOUS at 14:02

## 2023-02-11 NOTE — NURSING NOTE
ACC REVIEW REPORT: [facility]        PATIENT NAME: Yamile Angel    PATIENT ID: [id number]        COVID-19 ACC SCREENING       DOES THE PATIENT HAVE A FEVER GREATER THAN OR EQUAL .4: NO    IS THE PATIENT EXPERIENCING SHORTNESS OF BREATH: NO    DOES THE PATIENT HAVE A COUGH: NO  DOES THE PATIENT HAVE ANY OF THE FOLLOWING RISK FACTORS:    EXPOSURE TO SUSPECTED OR KNOWN COVID-19: NO    RECENT TRAVEL HISTORY TO ENDEMIC AREA (DOMESTIC/LOCAL): NO    IS THE PATIENT A HEALTHCARE WORKER: NO    HAS THE PATIENT EXPERIENCED A LOSS OF SENSE OF TASTE OR SMELL: NO    HAS THE PATIENT BEEN TESTED FOR COVID-19: YES    DATE TESTED: 02/11/2023  RESULTS: NEGATIVE FOR COVID    LAB TESTING SENT TO:           BED: S205    BED TYPE: TELE    BED GIVEN TO: LILLI AKINS RN     TIME BED GIVEN: 1835    TODAY'S DATE: 2/11/2023    TRANSFER DATE: 02/11/2023    ETA:     TRANSFERRING FACILITY: [facility]    TRANSFERRING FACILITY PHONE # : [phone]    TRANSFERRING PROVIDER: CAM ALEJANDRE    DATE/TIME REQUEST RECEIVED: 02/11/2023    [facility] RN: PATITO SANCHEZ    REPORT FROM: LILLI AKINS RN     TIME REPORT TAKEN: 1835    DIAGNOSIS: OBSTRUCTING GALLSTONE    REASON FOR TRANSFER TO [facility]: NEEDS ERC    TRANSPORTATION: GROUND    CLINICAL REASON FOR TRANSFER TO [facility]: PRESENTED WITH C/O N/V & ABD PAIN.  SHE IS UNABLE TO KEEP ANYTHING DOWN.  SHE WAS ADMITTED FOR THE SAME SYMPTOMS ON December 31 AND WAS DX WITH CHRONIC MESENTERIC ISCHEMIA.  CTA SHOWED THAT SHE HAD NARROWING OF THE CELIAC & SMA.  VASCULAR SURGERY IS SUPPOSE TO SEE HER AS AN OUTPATIENT.  SHE HAS A HX OF COPD AND CONTINUES TO SMOKE. ALSO A HX OF A-FIB, ARTHRITIS, HYPERLIPIDEMIA & HTN.   SHE TAKES ELIQUIS.       CLINICAL INFORMATION    HEIGHT:     WEIGHT: 147 LBS    ALLERGIES: PCN & SULFA ANTIBIOTICS    INFECTIOUS DISEASE:     ISOLATION:    VITAL SIGNS:   TIME: 1835  TEMP: 98.4  PULSE:75   B/P: 120/80  RESP: 20  98% ON RMA        LAB INFORMATION: WBC-23.57, CHLORIDE-96,  SODIUM-134, GLUCOSE-128    CULTURE INFORMATION:     MEDS/IV FLUIDS: # 20 G LAC WITH NS @ 125/ML/HR  RECEIVED:  CEFEPIME 2 GRAMS                        TOTAL: MORPHINE 4 MG                         ZOFRAN: 4 MG                         ATIVAN .5                         NS BOLUS 1,000 ML NS    CARDIAC SYSTEM:    CHEST PAIN:     RATE:     SCALE:     RHYTHM: ST    Is patient taking or has patient been given any drugs that could increase bleeding? YES    DRUG:  ELIQUIS,   LAST DOSE WAS AT 9 AM THIS MORNING.         TROPONIN:    DATE:   TIME:   TROP:     DATE:   TIME:   TROP:       CARDIAC NOTES: HISTORY OF A-FIB AND IS ON ELIQUIS.      RESPIRATORY SYSTEM:    LUNG SOUNDS:       OXYGEN:      CT CHEST PULMONARY EMBOLISM  IMPRESSION:  1. Negative for pulmonary embolus.  2. Negative for thoracic aortic aneurysm/dissection.  3. No acute pulmonary process. Pulmonary emphysema.       PNEUMO CHEST TUBE SEAL TYPE:     RESPIRATORY STATUS:       CNS/MUSCULOSKELETAL    ALERT AND ORIENTED:    PERSON: YES  PLACE: YES  TIME: YES      CAT SCAN RESULTS:     MRI RESULTS:     CNS/MUSCULOSKELETAL NOTES:       GI//GY      ABDOMINAL PAIN:     VOMITING:     DIARRHEA:     NAUSEA:     BOWEL SOUNDS:     OCCULT STOOL:     HEMATURIA:     NG TUBE:    SIZE:     DATE INSERTED:       ULTRASOUND RESULTS:     ACUTE ABDOMEN RESULTS:     MRI ABDOMEN    IMPRESSION:  1.  Suspected choledocholithiasis with a single small filling defect in distal common bile duct.  2.  Mild intrahepatic and moderate extrahepatic bile duct dilatation to the level of the ampulla. CBD 15 mm.  3.  Normal pancreatic duct.  4.  Material layering within the dependent portion of a nondistended, mildly thick-walled gallbladder.     CT ABDOMEN PELVIS  :     1. Redemonstrated mild prominence of the intrahepatic and extrahepatic bile ducts, similar to the prior exam. No visible choledocholithiasis. Relation with liver function tests is recommended. GI consultation may be considered.  2. No  other acute abdominal or pelvic findings.        GI//GY NOTES:     SMITH:     PAST MEDICAL HISTORY:   Pre-operative clearance 12/6/2021   Screen for colon cancer 12/6/2021   Sepsis with acute renal failure without septic shock, due to unspecified organism, unspecified acute renal failure type 12/31/2022   Thickened heart muscle causing obstruction 1/19/2023   Atrial fibrillation (irregular heartbeat) 1/19/2023         OTHER SYMPTOM NOTES:     ADDITIONAL NOTES:           Rosita Gonzalez RN  2/11/2023  18:24 EST

## 2023-02-11 NOTE — ED NOTES
Called Report to [facility], Rosita, RN. Pt is going to RM S205. Dr. Briscoe is the accepting physician.

## 2023-02-11 NOTE — ED PROVIDER NOTES
Subjective   History of Present Illness  78-year-old female who presents to the ED today with a 1 week history of nausea, vomiting and abdominal pain.  She denies any diarrhea.  She describes the pain as sharp and generalized.  She states it is all across her abdomen and radiates up underneath her ribs.  She states today she feels shaky all over.  She states she has been able to keep some liquids down but cannot keep any solids down.  She denies any known fever.  She denies any urinary symptoms.  She has not tried any medication for her symptoms.  She reports that she was admitted for similar symptoms on December 31.  She states she was septic and was diagnosed with chronic mesenteric ischemia.  I reviewed the CTA that she had the first week of January.  It shows that she had mild narrowing of the celiac and SMA.  She is supposed to be following up with vascular surgery as an outpatient.  The patient does have a history of COPD.  She does continue to smoke.  She also has a history of arthritis, hyperlipidemia, hypertension and paroxysmal atrial fibrillation.  She is currently on Eliquis.  She reports that she took a dose at 9 AM today.    History provided by:  Patient  Abdominal Pain  Pain location:  Generalized  Pain quality: sharp    Pain radiates to:  Chest  Pain severity:  Severe  Onset quality:  Gradual  Duration:  1 week  Timing:  Constant  Progression:  Worsening  Chronicity:  Recurrent  Context: not sick contacts, not suspicious food intake and not trauma    Relieved by:  Nothing  Worsened by:  Nothing  Ineffective treatments:  None tried  Associated symptoms: anorexia, fatigue, nausea and vomiting    Associated symptoms: no chills, no constipation, no cough, no diarrhea, no dysuria, no fever, no hematemesis, no hematochezia, no hematuria, no melena and no shortness of breath    Risk factors: being elderly        Review of Systems   Constitutional: Positive for appetite change and fatigue. Negative for  chills and fever.   HENT: Negative.    Eyes: Negative.    Respiratory: Negative.  Negative for cough and shortness of breath.    Cardiovascular: Negative.    Gastrointestinal: Positive for abdominal pain, anorexia, nausea and vomiting. Negative for constipation, diarrhea, hematemesis, hematochezia and melena.   Genitourinary: Negative.  Negative for dysuria and hematuria.   Musculoskeletal: Negative.    Skin: Negative.    Neurological: Negative.    Psychiatric/Behavioral: Negative.    All other systems reviewed and are negative.      Past Medical History:   Diagnosis Date   • Arthritis    • COPD (chronic obstructive pulmonary disease) ([facility])    • Elevated cholesterol    • HOCM (hypertrophic obstructive cardiomyopathy) ([facility]) 1/19/2023   • Hyperlipidemia    • Hypertension    • Sepsis with acute renal failure without septic shock, due to unspecified organism, unspecified acute renal failure type ([facility]) 12/31/2022       Allergies   Allergen Reactions   • Penicillins Rash   • Sulfa Antibiotics Rash       Past Surgical History:   Procedure Laterality Date   • APPENDECTOMY     • COLONOSCOPY     • COLONOSCOPY N/A 12/15/2021    Procedure: COLONOSCOPY FOR SCREENING;  Surgeon: Rhonda Parada MD;  Location: [facility];  Service: Gastroenterology;  Laterality: N/A;   • EYE SURGERY     • HYSTERECTOMY     • SHOULDER SURGERY     • TUBAL ABDOMINAL LIGATION     • UPPER GASTROINTESTINAL ENDOSCOPY     • WRIST SURGERY         No family history on file.    Social History     Socioeconomic History   • Marital status:    Tobacco Use   • Smoking status: Every Day     Packs/day: 1.00     Years: 60.00     Pack years: 60.00     Types: Cigarettes   • Smokeless tobacco: Never   Vaping Use   • Vaping Use: Former   Substance and Sexual Activity   • Alcohol use: Never   • Drug use: Never   • Sexual activity: Never           Objective   Physical Exam  Vitals and nursing note reviewed.   Constitutional:       General: She is not in  acute distress.     Appearance: She is well-developed. She is ill-appearing. She is not diaphoretic.   HENT:      Head: Normocephalic and atraumatic.      Jaw: There is normal jaw occlusion.      Mouth/Throat:      Lips: Pink.      Mouth: Mucous membranes are dry.   Eyes:      Extraocular Movements: Extraocular movements intact.      Pupils: Pupils are equal, round, and reactive to light.   Cardiovascular:      Rate and Rhythm: Regular rhythm. Tachycardia present.      Heart sounds: Normal heart sounds.   Pulmonary:      Effort: Pulmonary effort is normal.      Breath sounds: Normal breath sounds. No wheezing or rhonchi.   Chest:      Chest wall: No tenderness.   Abdominal:      General: Bowel sounds are decreased.      Palpations: Abdomen is soft.      Tenderness: There is generalized abdominal tenderness (no evidence of an acute surgical abdomen on exam). There is no right CVA tenderness, left CVA tenderness, guarding or rebound.   Skin:     General: Skin is warm and dry.      Capillary Refill: Capillary refill takes less than 2 seconds.   Neurological:      General: No focal deficit present.      Mental Status: She is alert and oriented to person, place, and time.   Psychiatric:         Mood and Affect: Mood normal.         Procedures        Results for orders placed or performed during the hospital encounter of 02/11/23   COVID-19 and FLU A/B PCR - Swab, Nasopharynx    Specimen: Nasopharynx; Swab   Result Value Ref Range    COVID19 Not Detected Not Detected - Ref. Range    Influenza A PCR Not Detected Not Detected    Influenza B PCR Not Detected Not Detected   Comprehensive Metabolic Panel    Specimen: Blood   Result Value Ref Range    Glucose 128 (H) 65 - 99 mg/dL    BUN 21 8 - 23 mg/dL    Creatinine 0.77 0.57 - 1.00 mg/dL    Sodium 134 (L) 136 - 145 mmol/L    Potassium 3.9 3.5 - 5.2 mmol/L    Chloride 96 (L) 98 - 107 mmol/L    CO2 24.5 22.0 - 29.0 mmol/L    Calcium 9.0 8.6 - 10.5 mg/dL    Total Protein 8.0 6.0  - 8.5 g/dL    Albumin 3.9 3.5 - 5.2 g/dL    ALT (SGPT) 8 1 - 33 U/L    AST (SGOT) 20 1 - 32 U/L    Alkaline Phosphatase 141 (H) 39 - 117 U/L    Total Bilirubin 0.7 0.0 - 1.2 mg/dL    Globulin 4.1 gm/dL    A/G Ratio 1.0 g/dL    BUN/Creatinine Ratio 27.3 (H) 7.0 - 25.0    Anion Gap 13.5 5.0 - 15.0 mmol/L    eGFR 79.1 >60.0 mL/min/1.73   Lipase    Specimen: Blood   Result Value Ref Range    Lipase 20 13 - 60 U/L   Amylase    Specimen: Blood   Result Value Ref Range    Amylase 44 28 - 100 U/L   Urinalysis With Microscopic If Indicated (No Culture) - Urine, Clean Catch    Specimen: Urine, Clean Catch   Result Value Ref Range    Color, UA Yellow Yellow, Straw    Appearance, UA Clear Clear    pH, UA 5.5 5.0 - 8.0    Specific Gravity, UA >1.030 (H) 1.005 - 1.030    Glucose, UA Negative Negative    Ketones, UA Negative Negative    Bilirubin, UA Negative Negative    Blood, UA Negative Negative    Protein, UA Negative Negative    Leuk Esterase, UA Small (1+) (A) Negative    Nitrite, UA Negative Negative    Urobilinogen, UA 1.0 E.U./dL 0.2 - 1.0 E.U./dL   High Sensitivity Troponin T    Specimen: Blood   Result Value Ref Range    HS Troponin T 21 (H) <10 ng/L   Lactic Acid, Plasma    Specimen: Blood   Result Value Ref Range    Lactate 2.6 (C) 0.5 - 2.0 mmol/L   Procalcitonin    Specimen: Blood   Result Value Ref Range    Procalcitonin 0.21 0.00 - 0.25 ng/mL   Sedimentation Rate    Specimen: Blood   Result Value Ref Range    Sed Rate 87 (H) 0 - 30 mm/hr   C-reactive Protein    Specimen: Blood   Result Value Ref Range    C-Reactive Protein 12.77 (H) 0.00 - 0.50 mg/dL   Magnesium    Specimen: Blood   Result Value Ref Range    Magnesium 1.9 1.6 - 2.4 mg/dL   CBC Auto Differential    Specimen: Blood   Result Value Ref Range    WBC 23.57 (H) 3.40 - 10.80 10*3/mm3    RBC 3.80 3.77 - 5.28 10*6/mm3    Hemoglobin 12.5 12.0 - 15.9 g/dL    Hematocrit 40.1 34.0 - 46.6 %    .5 (H) 79.0 - 97.0 fL    MCH 32.9 26.6 - 33.0 pg    MCHC 31.2  (L) 31.5 - 35.7 g/dL    RDW 14.6 12.3 - 15.4 %    RDW-SD 57.3 (H) 37.0 - 54.0 fl    MPV 9.6 6.0 - 12.0 fL    Platelets 347 140 - 450 10*3/mm3   Scan Slide    Specimen: Blood   Result Value Ref Range    Scan Slide     Blood Gas, Arterial With Co-Ox    Specimen: Arterial Blood   Result Value Ref Range    Site Right Radial     Rogelio's Test Positive     pH, Arterial 7.407 7.350 - 7.450 pH units    pCO2, Arterial 43.3 35.0 - 45.0 mm Hg    pO2, Arterial 50.2 (C) 83.0 - 108.0 mm Hg    HCO3, Arterial 27.3 (H) 20.0 - 26.0 mmol/L    Base Excess, Arterial 2.2 (H) 0.0 - 2.0 mmol/L    O2 Saturation, Arterial 86.0 (L) 94.0 - 99.0 %    Hemoglobin, Blood Gas 11.8 (L) 13.5 - 17.5 g/dL    Hematocrit, Blood Gas 36.2 (L) 38.0 - 51.0 %    Oxyhemoglobin 82.2 (L) 94 - 99 %    Methemoglobin 0.00 0.00 - 3.00 %    Carboxyhemoglobin 4.4 0 - 5 %    A-a DO2 45.1 0.0 - 300.0 mmHg    CO2 Content 28.6 22 - 33 mmol/L    Temperature 0.0 C    Barometric Pressure for Blood Gas 734 mmHg    Modality Room Air     FIO2 21 %    Ventilator Mode NA     Note Read back and acknowledge     Notified Who ER  AND RN     Notified By [id number]     Notified Time 02/11/2023 12:19     Collected by [id number]     pH, Temp Corrected      pCO2, Temperature Corrected      pO2, Temperature Corrected     High Sensitivity Troponin T 2Hr    Specimen: Arm, Right; Blood   Result Value Ref Range    HS Troponin T 19 (H) <10 ng/L    Troponin T Delta -2 >=-4 - <+4 ng/L   STAT Lactic Acid, Reflex    Specimen: Arm, Right; Blood   Result Value Ref Range    Lactate 0.9 0.5 - 2.0 mmol/L   Manual Differential    Specimen: Blood   Result Value Ref Range    Neutrophil % 63.0 42.7 - 76.0 %    Lymphocyte % 19.0 (L) 19.6 - 45.3 %    Monocyte % 5.0 5.0 - 12.0 %    Eosinophil % 13.0 (H) 0.3 - 6.2 %    Neutrophils Absolute 14.85 (H) 1.70 - 7.00 10*3/mm3    Lymphocytes Absolute 4.48 (H) 0.70 - 3.10 10*3/mm3    Monocytes Absolute 1.18 (H) 0.10 - 0.90 10*3/mm3    Eosinophils Absolute 3.06 (H) 0.00 - 0.40  10*3/mm3    Macrocytes Slight/1+ None Seen    Platelet Morphology Normal Normal   Urinalysis, Microscopic Only - Urine, Clean Catch    Specimen: Urine, Clean Catch   Result Value Ref Range    RBC, UA 0-2 None Seen, 0-2 /HPF    WBC, UA 0-2 None Seen, 0-2 /HPF    Bacteria, UA None Seen None Seen /HPF    Squamous Epithelial Cells, UA 3-6 (A) None Seen, 0-2 /HPF    Hyaline Casts, UA None Seen None Seen /LPF    Methodology Automated Microscopy    ECG 12 Lead Tachycardia   Result Value Ref Range    QT Interval 366 ms    QTC Interval 479 ms   Green Top (Gel)   Result Value Ref Range    Extra Tube Hold for add-ons.    Lavender Top   Result Value Ref Range    Extra Tube hold for add-on    Gold Top - SST   Result Value Ref Range    Extra Tube Hold for add-ons.    Light Blue Top   Result Value Ref Range    Extra Tube Hold for add-ons.          ED Course  ED Course as of 02/11/23 1733   Sat Feb 11, 2023   1219 pO2, Arterial(!!): 50.2  2L of oxygen ordered [AH]   1219 WBC(!): 23.57 [AH]   1226 Sed Rate(!): 87 [AH]   1237 EKG at 1219 sinus tachycardia 103 bpm, , QRS 84, QTc 479, regular axis, no significant ST deviation or T wave abnormalities concerning for acute ischemia. [KP]   1247 Lactate(!!): 2.6 [AH]   1248 HS Troponin T(!): 21 [AH]   1248 C-Reactive Protein(!): 12.77 [AH]   1253 XR Chest 1 View  FINDINGS:     Lungs/Pleura: Increased lung markings. No focal opacity. No effusions.     Mediastinum: Normal mediastinal contours.  Heart is normal in size.     Other: None     IMPRESSION:  Stable chest with suspected airways disease [AH]   1320 Spoke with Dr. De Los Santos about type of imaging to obtain - he advised to get CT abd/pelvis with IV contrast at this time. [AH]   1418 CT Angiogram Chest Pulmonary Embolism  FINDINGS:   Adequate opacification of the pulmonary arteries with no filling defects. Thoracic aorta normal in course and caliber without dissection. Heart size is normal. No pericardial effusion.     No pleural  effusion. No pneumothorax. No focal pulmonary infiltrates. Pulmonary emphysema.     Visualized upper abdomen is unremarkable.     No acute osseous abnormality.     IMPRESSION:  1. Negative for pulmonary embolus.  2. Negative for thoracic aortic aneurysm/dissection.  3. No acute pulmonary process. Pulmonary emphysema. [AH]   1419 CT Abdomen Pelvis With Contrast  FINDINGS:  Visualized lung bases are unremarkable.     Abdomen:   The spleen and pancreas are normal. Gallbladder is unremarkable. Mild prominence of the intrahepatic and extrahepatic bile ducts, similar to the prior exam. The liver is otherwise unremarkable. Both adrenal glands are normal. The kidneys are normal.  Abdominal aorta is somewhat tortuous, but normal in caliber with extensive atherosclerotic calcification. No dissection. Small bowel is unremarkable without obstruction. Appendix not well visualized. No pericecal inflammation. The colon is unremarkable.  No free fluid or free air.     Pelvis:   The urinary bladder is unremarkable.  Hysterectomy. No free pelvic fluid.     No acute osseous abnormality.        IMPRESSION:     1. Redemonstrated mild prominence of the intrahepatic and extrahepatic bile ducts, similar to the prior exam. No visible choledocholithiasis. Relation with liver function tests is recommended. GI consultation may be considered.  2. No other acute abdominal or pelvic findings. [AH]   1436 I have discussed imaging with Dr. De Los Santos. Plan is for MRCP to further evaluate this ductal dilatation.  Patient had elevated liver enzymes about 2 weeks ago but today they are normal.  Patient does not have any acute right upper quadrant abdominal pain on exam.  Her symptoms are much improved after IV fluids, morphine and Zofran. [AH]   1439 Patient reports she gets claustrophobic with MRI so Ativan has been ordered for the test. [AH]   1536 HS Troponin T(!): 19 [AH]   1536 Lactate: 0.9 [AH]   1637 MRI abdomen wo contrast mrcp  IMPRESSION:  1.   Suspected choledocholithiasis with a single small filling defect in distal common bile duct.  2.  Mild intrahepatic and moderate extrahepatic bile duct dilatation to the level of the ampulla. CBD 15 mm.  3.  Normal pancreatic duct.  4.  Material layering within the dependent portion of a nondistended, mildly thick-walled gallbladder. []   1648 Spoke with YAZAN Linares at [facility]. He advised they do not have IR back up at this facility currently and if he were unable to retreive the stone, she would need IR for a drain. Therefore unable to accept at this time.  Will try [facility]. [AH]   1724 Accepted to [facility] in transfer by Dr. Briscoe, hospitalist. They will call back with bed assignment. []      ED Course User Index  [AH] Cydney Canela PA  [] Mitch Mercado MD                                           Medical Decision Making  78-year-old female presents to the ED today for a 1 week history of nausea, vomiting and abdominal pain.  She states the pain radiates up underneath her ribs.  She was admitted for similar symptoms on December 31 and was thought to have chronic mesenteric ischemia.  She had a CTA of her abdomen pelvis at that time that showed mild narrowing of the celiac artery and SMA.  Patient presented with sepsis with hypotension and tachycardia.  She received IV fluids as well as IV cefepime and Flagyl.  She had an elevated white blood cell count as well as elevated inflammatory markers.  Her vital signs did improve with IV fluids.  Her pain and nausea improved with IV morphine and Zofran.  She was mildly hypoxic upon arrival.  She does have COPD and continues to smoke.  She was started on 2 L of oxygen and her oxygen saturations have been stable.  She had a CT chest PE protocol which showed no acute abnormalities.  She had a CT abdomen and pelvis which showed dilatation of the intrahepatic and extrahepatic bile ducts.  Due to this she had a MRCP.  Her liver enzymes and bilirubin were normal today but were  noted to be elevated 2 or 3 weeks ago.  MRCP showed suspected choledocholithiasis in the distal common bile duct with a single filling defect measuring 3 mm.  Her common bile duct measured 15 mm.  Due to the suspected cholangitis due to a bile duct obstruction she will need to be transferred to a facility that can do an ERCP.  I did reach out to [facility] first due to the patient and her  wanting to stay as close to home as possible.  I spoke with Dr. Celaya who advised he does not have interventional radiology backup if it were needed this weekend therefore he could not accept an admission.  I spoke with the hospitalist service at [facility] and they are agreeable to admit the patient and accepted the transfer.  They advised they should be able to get her a bed tonight but it may be after shift change.  They will call back with bed assignment.  Patient and her  are agreeable with the transfer plan at this time.    Calculus of bile duct with acute cholangitis with obstruction: acute illness or injury  Sepsis, due to unspecified organism, unspecified whether acute organ dysfunction present (HCC): acute illness or injury  Amount and/or Complexity of Data Reviewed  Labs: ordered. Decision-making details documented in ED Course.  Radiology: ordered. Decision-making details documented in ED Course.  ECG/medicine tests: ordered.      Risk  Prescription drug management.  Decision regarding hospitalization.          Final diagnoses:   Calculus of bile duct with acute cholangitis with obstruction   Sepsis, due to unspecified organism, unspecified whether acute organ dysfunction present (HCC)       ED Disposition  ED Disposition     ED Disposition   Transfer to Another Facility     Condition   --    Comment   --             No follow-up provider specified.       Medication List      ASK your doctor about these medications    potassium chloride 10 MEQ CR tablet  Take 1 tablet  by mouth 2 (Two) Times a Day for 15 days.  Ask about: Should I take this medication?             Cydney Canela, PA  02/11/23 2701

## 2023-02-11 NOTE — ED NOTES
Pt is back from MRI and tolerated it well. Pts vital signs are stable and she has no requests at this time.

## 2023-02-11 NOTE — ED NOTES
[facility] called back and advised that they    will not be taking the pt to [facility]. I called [facility] and their going to have their oic call  me back

## 2023-02-12 ENCOUNTER — ANESTHESIA EVENT (OUTPATIENT)
Dept: GASTROENTEROLOGY | Facility: HOSPITAL | Age: 78
DRG: 444 | End: 2023-02-12
Payer: MEDICARE

## 2023-02-12 LAB
ANION GAP SERPL CALCULATED.3IONS-SCNC: 7 MMOL/L (ref 5–15)
BASOPHILS # BLD AUTO: 0.04 10*3/MM3 (ref 0–0.2)
BASOPHILS NFR BLD AUTO: 0.3 % (ref 0–1.5)
BUN SERPL-MCNC: 14 MG/DL (ref 8–23)
BUN/CREAT SERPL: 22.6 (ref 7–25)
CALCIUM SPEC-SCNC: 8.2 MG/DL (ref 8.6–10.5)
CHLORIDE SERPL-SCNC: 102 MMOL/L (ref 98–107)
CO2 SERPL-SCNC: 25 MMOL/L (ref 22–29)
CREAT SERPL-MCNC: 0.62 MG/DL (ref 0.57–1)
DEPRECATED RDW RBC AUTO: 58.5 FL (ref 37–54)
EGFRCR SERPLBLD CKD-EPI 2021: 91.3 ML/MIN/1.73
EOSINOPHIL # BLD AUTO: 1.63 10*3/MM3 (ref 0–0.4)
EOSINOPHIL NFR BLD AUTO: 11.5 % (ref 0.3–6.2)
ERYTHROCYTE [DISTWIDTH] IN BLOOD BY AUTOMATED COUNT: 14.4 % (ref 12.3–15.4)
FERRITIN SERPL-MCNC: 200.3 NG/ML (ref 13–150)
FOLATE SERPL-MCNC: 5.86 NG/ML (ref 4.78–24.2)
GLUCOSE SERPL-MCNC: 96 MG/DL (ref 65–99)
HCT VFR BLD AUTO: 32.7 % (ref 34–46.6)
HGB BLD-MCNC: 9.7 G/DL (ref 12–15.9)
IMM GRANULOCYTES # BLD AUTO: 0.06 10*3/MM3 (ref 0–0.05)
IMM GRANULOCYTES NFR BLD AUTO: 0.4 % (ref 0–0.5)
LYMPHOCYTES # BLD AUTO: 2.98 10*3/MM3 (ref 0.7–3.1)
LYMPHOCYTES NFR BLD AUTO: 21 % (ref 19.6–45.3)
MAGNESIUM SERPL-MCNC: 2 MG/DL (ref 1.6–2.4)
MCH RBC QN AUTO: 32.6 PG (ref 26.6–33)
MCHC RBC AUTO-ENTMCNC: 29.7 G/DL (ref 31.5–35.7)
MCV RBC AUTO: 109.7 FL (ref 79–97)
MONOCYTES # BLD AUTO: 0.86 10*3/MM3 (ref 0.1–0.9)
MONOCYTES NFR BLD AUTO: 6 % (ref 5–12)
NEUTROPHILS NFR BLD AUTO: 60.8 % (ref 42.7–76)
NEUTROPHILS NFR BLD AUTO: 8.65 10*3/MM3 (ref 1.7–7)
NRBC BLD AUTO-RTO: 0 /100 WBC (ref 0–0.2)
PLATELET # BLD AUTO: 237 10*3/MM3 (ref 140–450)
PMV BLD AUTO: 9.7 FL (ref 6–12)
POTASSIUM SERPL-SCNC: 4.3 MMOL/L (ref 3.5–5.2)
RBC # BLD AUTO: 2.98 10*6/MM3 (ref 3.77–5.28)
SODIUM SERPL-SCNC: 134 MMOL/L (ref 136–145)
WBC NRBC COR # BLD: 14.22 10*3/MM3 (ref 3.4–10.8)

## 2023-02-12 PROCEDURE — 82728 ASSAY OF FERRITIN: CPT | Performed by: INTERNAL MEDICINE

## 2023-02-12 PROCEDURE — 94640 AIRWAY INHALATION TREATMENT: CPT

## 2023-02-12 PROCEDURE — 94761 N-INVAS EAR/PLS OXIMETRY MLT: CPT

## 2023-02-12 PROCEDURE — 94664 DEMO&/EVAL PT USE INHALER: CPT

## 2023-02-12 PROCEDURE — 99222 1ST HOSP IP/OBS MODERATE 55: CPT | Performed by: PHYSICIAN ASSISTANT

## 2023-02-12 PROCEDURE — 83735 ASSAY OF MAGNESIUM: CPT

## 2023-02-12 PROCEDURE — 82746 ASSAY OF FOLIC ACID SERUM: CPT | Performed by: INTERNAL MEDICINE

## 2023-02-12 PROCEDURE — 80048 BASIC METABOLIC PNL TOTAL CA: CPT

## 2023-02-12 PROCEDURE — 99223 1ST HOSP IP/OBS HIGH 75: CPT

## 2023-02-12 PROCEDURE — 25010000002 HYDROMORPHONE PER 4 MG: Performed by: INTERNAL MEDICINE

## 2023-02-12 PROCEDURE — 85025 COMPLETE CBC W/AUTO DIFF WBC: CPT

## 2023-02-12 PROCEDURE — 99233 SBSQ HOSP IP/OBS HIGH 50: CPT | Performed by: INTERNAL MEDICINE

## 2023-02-12 PROCEDURE — 25010000002 CEFEPIME PER 500 MG

## 2023-02-12 PROCEDURE — 94799 UNLISTED PULMONARY SVC/PX: CPT

## 2023-02-12 RX ORDER — HYDROMORPHONE HYDROCHLORIDE 1 MG/ML
0.5 INJECTION, SOLUTION INTRAMUSCULAR; INTRAVENOUS; SUBCUTANEOUS
Status: CANCELLED | OUTPATIENT
Start: 2023-02-12

## 2023-02-12 RX ORDER — SODIUM CHLORIDE 9 MG/ML
40 INJECTION, SOLUTION INTRAVENOUS AS NEEDED
Status: CANCELLED | OUTPATIENT
Start: 2023-02-12

## 2023-02-12 RX ORDER — GABAPENTIN 300 MG/1
600 CAPSULE ORAL EVERY 8 HOURS SCHEDULED
Status: DISCONTINUED | OUTPATIENT
Start: 2023-02-12 | End: 2023-02-13 | Stop reason: HOSPADM

## 2023-02-12 RX ORDER — FAMOTIDINE 20 MG/1
20 TABLET, FILM COATED ORAL ONCE
Status: CANCELLED | OUTPATIENT
Start: 2023-02-12 | End: 2023-02-12

## 2023-02-12 RX ORDER — LIDOCAINE HYDROCHLORIDE 10 MG/ML
0.5 INJECTION, SOLUTION EPIDURAL; INFILTRATION; INTRACAUDAL; PERINEURAL ONCE AS NEEDED
Status: CANCELLED | OUTPATIENT
Start: 2023-02-12

## 2023-02-12 RX ORDER — MIDAZOLAM HYDROCHLORIDE 1 MG/ML
0.5 INJECTION INTRAMUSCULAR; INTRAVENOUS
Status: CANCELLED | OUTPATIENT
Start: 2023-02-12

## 2023-02-12 RX ORDER — SODIUM CHLORIDE 0.9 % (FLUSH) 0.9 %
10 SYRINGE (ML) INJECTION EVERY 12 HOURS SCHEDULED
Status: CANCELLED | OUTPATIENT
Start: 2023-02-12

## 2023-02-12 RX ORDER — HYDROCODONE BITARTRATE AND ACETAMINOPHEN 5; 325 MG/1; MG/1
1 TABLET ORAL EVERY 6 HOURS PRN
Status: DISCONTINUED | OUTPATIENT
Start: 2023-02-12 | End: 2023-02-13 | Stop reason: HOSPADM

## 2023-02-12 RX ADMIN — Medication 10 ML: at 20:18

## 2023-02-12 RX ADMIN — CEFEPIME 2 G: 2 INJECTION, POWDER, FOR SOLUTION INTRAVENOUS at 01:58

## 2023-02-12 RX ADMIN — HYDROCODONE BITARTRATE AND ACETAMINOPHEN 1 TABLET: 5; 325 TABLET ORAL at 11:03

## 2023-02-12 RX ADMIN — TRAZODONE HYDROCHLORIDE 25 MG: 50 TABLET ORAL at 00:42

## 2023-02-12 RX ADMIN — TRAZODONE HYDROCHLORIDE 25 MG: 50 TABLET ORAL at 20:20

## 2023-02-12 RX ADMIN — GABAPENTIN 600 MG: 300 CAPSULE ORAL at 05:15

## 2023-02-12 RX ADMIN — IPRATROPIUM BROMIDE AND ALBUTEROL SULFATE 1.5 ML: 2.5; .5 SOLUTION RESPIRATORY (INHALATION) at 12:23

## 2023-02-12 RX ADMIN — FERROUS SULFATE TAB 325 MG (65 MG ELEMENTAL FE) 325 MG: 325 (65 FE) TAB at 09:16

## 2023-02-12 RX ADMIN — HYDROMORPHONE HYDROCHLORIDE 0.5 MG: 1 INJECTION, SOLUTION INTRAMUSCULAR; INTRAVENOUS; SUBCUTANEOUS at 20:18

## 2023-02-12 RX ADMIN — CEFEPIME 2 G: 2 INJECTION, POWDER, FOR SOLUTION INTRAVENOUS at 09:17

## 2023-02-12 RX ADMIN — GABAPENTIN 600 MG: 300 CAPSULE ORAL at 14:38

## 2023-02-12 RX ADMIN — SODIUM CHLORIDE 75 ML/HR: 9 INJECTION, SOLUTION INTRAVENOUS at 00:42

## 2023-02-12 RX ADMIN — ATORVASTATIN CALCIUM 40 MG: 40 TABLET, FILM COATED ORAL at 00:42

## 2023-02-12 RX ADMIN — ATORVASTATIN CALCIUM 40 MG: 40 TABLET, FILM COATED ORAL at 20:20

## 2023-02-12 RX ADMIN — IPRATROPIUM BROMIDE AND ALBUTEROL SULFATE 1.5 ML: 2.5; .5 SOLUTION RESPIRATORY (INHALATION) at 07:07

## 2023-02-12 RX ADMIN — VERAPAMIL HYDROCHLORIDE 180 MG: 180 TABLET, FILM COATED, EXTENDED RELEASE ORAL at 09:17

## 2023-02-12 RX ADMIN — IPRATROPIUM BROMIDE AND ALBUTEROL SULFATE 1.5 ML: 2.5; .5 SOLUTION RESPIRATORY (INHALATION) at 18:36

## 2023-02-12 RX ADMIN — Medication 10 ML: at 09:17

## 2023-02-12 RX ADMIN — HYDROMORPHONE HYDROCHLORIDE 0.5 MG: 1 INJECTION, SOLUTION INTRAMUSCULAR; INTRAVENOUS; SUBCUTANEOUS at 00:06

## 2023-02-12 RX ADMIN — FERROUS SULFATE TAB 325 MG (65 MG ELEMENTAL FE) 325 MG: 325 (65 FE) TAB at 17:36

## 2023-02-12 RX ADMIN — GABAPENTIN 600 MG: 300 CAPSULE ORAL at 20:20

## 2023-02-12 RX ADMIN — Medication 10 ML: at 00:06

## 2023-02-12 RX ADMIN — HYDROMORPHONE HYDROCHLORIDE 0.5 MG: 1 INJECTION, SOLUTION INTRAMUSCULAR; INTRAVENOUS; SUBCUTANEOUS at 15:13

## 2023-02-12 RX ADMIN — CEFEPIME 2 G: 2 INJECTION, POWDER, FOR SOLUTION INTRAVENOUS at 17:36

## 2023-02-12 NOTE — ED NOTES
Called [facility] requesting possible transport. Mani Gomez [facility] accepted transport and will send a BLS truck.

## 2023-02-12 NOTE — H&P
[facility] Medicine Services  HISTORY AND PHYSICAL    Patient Name: Yamile Angel  : 1945  MRN: [id number]  Primary Care Physician: Cristofer Garcia MD  Date of admission: 2023    Subjective   Subjective     Chief Complaint:  Abdominal Pain    HPI:  Yamile Angel is a 78 y.o. female with PMH of HTN, HLD, afib on eliquis, COPD and chronic mesenteric ischemia who presents as a direct admit from [facility] for cholelithiasis. She presented to [facility] ED earlier today for nausea, vomiting and abdominal pain for the past week. She describes the pain as sharp and 8/10 in severity. Her pain is mostly epigastic but radiates under her right ribcage. She has been unable to keep solids down. Denies fever. She took her eliquis this morning.         Review of Systems   Constitutional: Positive for appetite change. Negative for fever.   HENT: Negative for congestion, sore throat and trouble swallowing.    Eyes: Negative.    Respiratory: Positive for shortness of breath (chronic). Negative for cough and chest tightness.    Cardiovascular: Negative for chest pain and leg swelling.   Gastrointestinal: Positive for abdominal pain, nausea and vomiting. Negative for diarrhea.   Genitourinary: Negative for difficulty urinating and dysuria.   Musculoskeletal: Positive for back pain (chronic).   Skin: Negative.    Neurological: Negative for dizziness and headaches.   Psychiatric/Behavioral: Negative for agitation and confusion.        All other systems reviewed and are negative.     Personal History     Past Medical History:   Diagnosis Date   • Arthritis    • Choledocholithiasis 2023   • COPD (chronic obstructive pulmonary disease) ([facility])    • Elevated cholesterol    • HOCM (hypertrophic obstructive cardiomyopathy) ([facility]) 2023   • Hyperlipidemia    • Hypertension    • Sepsis with acute renal failure without septic shock, due to unspecified organism, unspecified acute renal  failure type (HCC) 12/31/2022             Past Surgical History:   Procedure Laterality Date   • APPENDECTOMY     • COLONOSCOPY     • COLONOSCOPY N/A 12/15/2021    Procedure: COLONOSCOPY FOR SCREENING;  Surgeon: Rhonda Parada MD;  Location: [facility];  Service: Gastroenterology;  Laterality: N/A;   • EYE SURGERY     • HYSTERECTOMY     • SHOULDER SURGERY     • TUBAL ABDOMINAL LIGATION     • UPPER GASTROINTESTINAL ENDOSCOPY     • WRIST SURGERY         Family History:  family history includes Cancer in her father; Heart disease in her mother. Otherwise pertinent FHx was reviewed and unremarkable.     Social History:  reports that she has been smoking cigarettes. She has a 60.00 pack-year smoking history. She has never used smokeless tobacco. She reports that she does not drink alcohol and does not use drugs.  Social History     Social History Narrative   • Not on file       Medications:  albuterol sulfate HFA, apixaban, atorvastatin, cyanocobalamin, doxycycline, ferrous sulfate, gabapentin, ipratropium-albuterol, ondansetron, oxyCODONE-acetaminophen, predniSONE, traZODone, and verapamil SR    Allergies   Allergen Reactions   • Penicillins Rash   • Sulfa Antibiotics Rash       Objective   Objective     Vital Signs:   Temp:  [98 °F (36.7 °C)-98.4 °F (36.9 °C)] 98.4 °F (36.9 °C)  Heart Rate:  [] 96  Resp:  [16-20] 18  BP: (111-149)/(70-91) 133/81  Flow (L/min):  [2] 2    Physical Exam  Constitutional:       General: She is not in acute distress.     Appearance: She is normal weight.   HENT:      Head: Normocephalic.      Nose: Nose normal. No congestion.      Mouth/Throat:      Mouth: Mucous membranes are moist.   Eyes:      Extraocular Movements: Extraocular movements intact.      Pupils: Pupils are equal, round, and reactive to light.   Cardiovascular:      Rate and Rhythm: Regular rhythm. Tachycardia present.      Pulses: Normal pulses.      Heart sounds: Normal heart sounds. No murmur  heard.  Pulmonary:      Effort: Pulmonary effort is normal. No respiratory distress.      Breath sounds: Normal breath sounds. No wheezing or rhonchi.   Abdominal:      General: Bowel sounds are normal. There is no distension.      Palpations: Abdomen is soft.      Tenderness: There is no abdominal tenderness.   Musculoskeletal:         General: No swelling. Normal range of motion.      Cervical back: Normal range of motion. No rigidity.   Skin:     General: Skin is warm.      Capillary Refill: Capillary refill takes less than 2 seconds.   Neurological:      General: No focal deficit present.      Mental Status: She is alert and oriented to person, place, and time.      Motor: No weakness.   Psychiatric:         Mood and Affect: Mood normal.         Behavior: Behavior normal.         Thought Content: Thought content normal.         Judgment: Judgment normal.          Result Review:  I have personally reviewed the results from the time of this admission to 2/11/2023 23:38 EST and agree with these findings:  [x]  Laboratory list / accordion  [x]  Microbiology  [x]  Radiology  []  EKG/Telemetry   []  Cardiology/Vascular   []  Pathology  [x]  Old records  []  Other:  Most notable findings include:     LAB RESULTS:      Lab 02/11/23  1505 02/11/23  1201   WBC  --  23.57*   HEMOGLOBIN  --  12.5   HEMATOCRIT  --  40.1   PLATELETS  --  347   NEUTROS ABS  --  14.85*   EOS ABS  --  3.06*   MCV  --  105.5*   SED RATE  --  87*   CRP  --  12.77*   PROCALCITONIN  --  0.21   LACTATE 0.9 2.6*         Lab 02/11/23  1201   SODIUM 134*   POTASSIUM 3.9   CHLORIDE 96*   CO2 24.5   ANION GAP 13.5   BUN 21   CREATININE 0.77   EGFR 79.1   GLUCOSE 128*   CALCIUM 9.0   MAGNESIUM 1.9         Lab 02/11/23  1201   TOTAL PROTEIN 8.0   ALBUMIN 3.9   GLOBULIN 4.1   ALT (SGPT) 8   AST (SGOT) 20   BILIRUBIN 0.7   ALK PHOS 141*   AMYLASE 44   LIPASE 20         Lab 02/11/23  1505 02/11/23  1201   HSTROP T 19* 21*                 Lab 02/11/23  1216    PH, ARTERIAL 7.407   PCO2, ARTERIAL 43.3   PO2 ART 50.2*   O2 SATURATION ART 86.0*   FIO2 21   HCO3 ART 27.3*   BASE EXCESS ART 2.2*   CARBOXYHEMOGLOBIN 4.4     Brief Urine Lab Results  (Last result in the past 365 days)      Color   Clarity   Blood   Leuk Est   Nitrite   Protein   CREAT   Urine HCG        02/11/23 1511 Yellow   Clear   Negative   Small (1+)   Negative   Negative               Microbiology Results (last 10 days)     Procedure Component Value - Date/Time    COVID PRE-OP / PRE-PROCEDURE SCREENING ORDER (NO ISOLATION) - Swab, Nasopharynx [[id number]]  (Normal) Collected: 02/11/23 1233    Lab Status: Final result Specimen: Swab from Nasopharynx Updated: 02/11/23 1306    Narrative:      The following orders were created for panel order COVID PRE-OP / PRE-PROCEDURE SCREENING ORDER (NO ISOLATION) - Swab, Nasopharynx.  Procedure                               Abnormality         Status                     ---------                               -----------         ------                     COVID-19 and FLU A/B PCR...[[id number]]  Normal              Final result                 Please view results for these tests on the individual orders.    COVID-19 and FLU A/B PCR - Swab, Nasopharynx [[id number]]  (Normal) Collected: 02/11/23 1233    Lab Status: Final result Specimen: Swab from Nasopharynx Updated: 02/11/23 1306     COVID19 Not Detected     Influenza A PCR Not Detected     Influenza B PCR Not Detected    Narrative:      Fact sheet for providers: https://www.fda.gov/media/091896/download    Fact sheet for patients: https://www.fda.gov/media/143358/download    Test performed by PCR.          CT Abdomen Pelvis With Contrast    Result Date: 2/11/2023  CT Abdomen Pelvis W INDICATION: Epigastric abdominal pain and vomiting. TECHNIQUE: CT of the abdomen and pelvis with IV contrast. Coronal and sagittal reconstructions were obtained.  Radiation dose reduction techniques included automated exposure control or  exposure modulation based on body size. Count of known CT and cardiac nuc med studies performed in previous 12 months: 6. COMPARISON: CT abdomen pelvis 1/26/2023 FINDINGS: Visualized lung bases are unremarkable. Abdomen: The spleen and pancreas are normal. Gallbladder is unremarkable. Mild prominence of the intrahepatic and extrahepatic bile ducts, similar to the prior exam. The liver is otherwise unremarkable. Both adrenal glands are normal. The kidneys are normal. Abdominal aorta is somewhat tortuous, but normal in caliber with extensive atherosclerotic calcification. No dissection. Small bowel is unremarkable without obstruction. Appendix not well visualized. No pericecal inflammation. The colon is unremarkable. No free fluid or free air. Pelvis: The urinary bladder is unremarkable.  Hysterectomy. No free pelvic fluid. No acute osseous abnormality.     Impression: 1. Redemonstrated mild prominence of the intrahepatic and extrahepatic bile ducts, similar to the prior exam. No visible choledocholithiasis. Relation with liver function tests is recommended. GI consultation may be considered. 2. No other acute abdominal or pelvic findings. Signer Name: Frank Chris MD  Signed: 2/11/2023 2:13 PM  Workstation Name: [id number]  [facility]    XR Chest 1 View    Result Date: 2/11/2023  INDICATION: Sepsis TECHNIQUE: 1 views of the chest COMPARISON: Chest radiograph 1/1/2023 FINDINGS: Lungs/Pleura: Increased lung markings. No focal opacity. No effusions. Mediastinum: Normal mediastinal contours.  Heart is normal in size. Other: None     Impression: Stable chest with suspected airways disease Signer Name: Brayan Galindo MD  Signed: 2/11/2023 12:47 PM  Workstation Name: [id number]  [facility]    CT Angiogram Chest Pulmonary Embolism    Result Date: 2/11/2023  CT CHEST PULMONARY EMBOLISM W CONTRAST INDICATION: Shortness of air TECHNIQUE: CT angiogram of the chest with  IV contrast. 3-D reconstructions were obtained and reviewed.   Radiation dose reduction techniques included automated exposure control or exposure modulation based on body size. Count of known CT and cardiac nuc med studies performed in previous 12 months: 6. COMPARISON: CT chest 12/31/2022 FINDINGS: Adequate opacification of the pulmonary arteries with no filling defects. Thoracic aorta normal in course and caliber without dissection. Heart size is normal. No pericardial effusion. No pleural effusion. No pneumothorax. No focal pulmonary infiltrates. Pulmonary emphysema. Visualized upper abdomen is unremarkable. No acute osseous abnormality.     Impression: 1. Negative for pulmonary embolus. 2. Negative for thoracic aortic aneurysm/dissection. 3. No acute pulmonary process. Pulmonary emphysema. Signer Name: Frank Chris MD  Signed: 2/11/2023 2:08 PM  Workstation Name: [email or web address]  [facility]    MRI abdomen wo contrast mrcp    Result Date: 2/11/2023  MRCP, NONCONTRAST, LIMITED, 2/11/2023 HISTORY: 78-year-old female presenting to the ED with abdominal pain, vomiting and sepsis. CT imaging showing bile duct dilatation. Total bilirubin level is normal, but alkaline phosphatase is elevated. TECHNIQUE: MRI examination of the upper abdomen was performed without gadolinium contrast administration. MRCP sequences were also performed. FINDINGS: Mild intrahepatic and moderate extrahepatic bile duct dilatation to the level of the ampulla. Upper CBD measures about 15 mm. There is a solitary small round filling defect in the distal CBD above the ampulla measuring about 3 mm (series #7/image 50; series #3/image 15). Distal common bile duct stone is likely. No other bile duct filling defects are identified. No pancreatic duct dilatation. Tiny amount of material layering within nondistended, mildly thick-walled gallbladder.     Impression: 1.  Suspected choledocholithiasis with a single small filling  defect in distal common bile duct. 2.  Mild intrahepatic and moderate extrahepatic bile duct dilatation to the level of the ampulla. CBD 15 mm. 3.  Normal pancreatic duct. 4.  Material layering within the dependent portion of a nondistended, mildly thick-walled gallbladder. Signer Name: Kaz Peterson MD  Signed: 2/11/2023 4:31 PM  Workstation Name: [email or web address]  [facility]      Results for orders placed during the hospital encounter of 12/31/22    Adult Transthoracic Echo Complete W/ Cont if Necessary Per Protocol    Interpretation Summary  •  Left ventricular ejection fraction appears to be 61 - 65%.  •  Left ventricular wall thickness is consistent with mild septal asymmetric hypertrophy.  •  Left ventricular diastolic function is consistent with (grade I) impaired relaxation.  •  Left ventricular intracavitary gradient noted to be 70 mmHg.  This may represent dynamic  proximal ventricular obstruction.  Clinical correlation suggested.      Assessment & Plan   Assessment & Plan       Choledocholithiasis    Paroxysmal atrial fibrillation (HCC)    Leukocytosis    HTN (hypertension)    HLD (hyperlipidemia)    Mesenteric ischemia, chronic (HCC)    COPD (chronic obstructive pulmonary disease) (HCC)    Tobacco abuse    Yamile Angel is a 78 y.o. female with PMH of HTN, HLD, afib on eliquis, COPD and chronic mesenteric ischemia who presents as a direct admit from [facility] for cholelithiasis.     Choledocholithiasis   Leukocytosis  -MRCP shows mild intrahepatic and moderate extrahepatic bile duct dilatation, suspected choledocholithiasis with small filling defect in distal common bile duct  -NPO at midnight  -Cefepime  -Pain control and antiemetics  -Maintenance fluids  -Consult GI    HTN  Afib  -EKG shows ST  -Hold eliquis  -Continue home verapamil    HLD  Chronic mesenteric ischemia  -Continue statin    COPD  Tobacco Abuse  -Duonebs  -Encourage cessation    Chronic Back Pain  -Continue  "home gabapentin  -Hold home percocet while getting dilaudid    DVT prophylaxis:  SCDs    CODE STATUS:  FULL       Expected Discharge  TBD      This note has been completed as part of a split-shared workflow.     Signature: Electronically signed by TOMAS Gabriel, 02/11/23, 11:46 PM EST.    Total APC Time: 60 minutes      Attending   Admission Attestation       I have performed an independent face-to-face diagnostic evaluation including performing an independent physical examination as documented here.  The documented plan of care above was reviewed and developed with the advanced practice clinician (APC).      Brief Summary Statement:   Yamile Angel is a 78 y.o. female who was sent as a transfer from the [facility] in [place].  Who states that she has had increased generalized abdominal pain for the last month, further increase in the last week.  She states that she went to the ED in [facility] (Saturday) due to 8/10 abdominal pain.  She describes the pain as cramping, sometimes sharp, located primarily at the epigastrium with some simultaneous pain in the right upper quadrant.  She denies any exacerbating or alleviating factors, though she does note that she has had decreased oral intake of food and fluids, noting that \"I cannot keep anything solid down,\" so she has not been attempting to eat or drink much in the last week.  MRCP at [place] showed apparent choledocholithiasis, so the patient was transferred here for possible ERCP.    Remainder of detailed HPI is as noted by APC and has been reviewed and/or edited by me for completeness.    Attending Physical Exam:  Temp:  [98 °F (36.7 °C)-98.4 °F (36.9 °C)] 98.1 °F (36.7 °C)  Heart Rate:  [] 110  Resp:  [16-20] 16  BP: (111-149)/(69-91) 120/69  Flow (L/min):  [2] 2    Constitutional: Awake, alert, NAD, pleasant.  Eyes: PERRLA, sclerae anicteric, no conjunctival injection  HENT: NCAT, mucous membranes moist  Neck: Supple, no " thyromegaly, no lymphadenopathy, trachea midline  Respiratory: Clear to auscultation bilaterally, nonlabored respirations   Cardiovascular: Mildly tachycardic with rate 104 on my exam, no murmurs, rubs, or gallops, palpable pedal pulses bilaterally  Gastrointestinal: Positive bowel sounds, soft, nontender, nondistended  Musculoskeletal: No bilateral ankle edema, no clubbing or cyanosis to extremities  Psychiatric: Appropriate affect, cooperative  Neurologic: Oriented x 3, strength symmetric in all extremities, Cranial Nerves grossly intact to confrontation, speech clear  Skin: No rashes, normal turgor.    Brief Assessment/Plan :  See detailed assessment and plan developed with APC which I have reviewed and/or edited for completeness.    Total time spent: 20 minutes  Time spent includes time reviewing chart, face-to-face time, counseling patient/family/caregiver, ordering medications/tests/procedures, communicating with other health care professionals, documenting clinical information in the electronic health record, and coordination of care.        Saulo Beckham III, DO  02/12/23

## 2023-02-12 NOTE — PROGRESS NOTES
[facility] Medicine Services  PROGRESS NOTE    Patient Name: Yamile Angel  : 1945  MRN: [id number]    Date of Admission: 2023  Primary Care Physician: Cristofer Garcia MD    Subjective   Subjective     CC:  Abdominal pain    HPI:  Denies any abdominal pain currently.  Feels ok.  States that she was having epigastric pain    ROS:  Gen- No fevers, chills  CV- No chest pain, palpitations  Resp- No cough, dyspnea  GI- No N/V/D, abd pain        Objective   Objective     Vital Signs:   Temp:  [98 °F (36.7 °C)-98.4 °F (36.9 °C)] 98.1 °F (36.7 °C)  Heart Rate:  [] 107  Resp:  [16-20] 20  BP: (111-149)/(69-91) 117/72  Flow (L/min):  [2] 2     Physical Exam:  Constitutional: No acute distress, awake, alert  HENT: NCAT, mucous membranes moist  Respiratory: Clear to auscultation bilaterally, respiratory effort normal   Cardiovascular: RRR, no murmurs, rubs, or gallops  Gastrointestinal: Positive bowel sounds, soft, nontender, nondistended  Musculoskeletal: No bilateral ankle edema  Psychiatric: Appropriate affect, cooperative  Neurologic: Oriented x 3, strength symmetric in all extremities, Cranial Nerves grossly intact to confrontation, speech clear  Skin: No rashes      Results Reviewed:  LAB RESULTS:      Lab 23  0650 23  1505 23  1201   WBC 14.22*  --  23.57*   HEMOGLOBIN 9.7*  --  12.5   HEMATOCRIT 32.7*  --  40.1   PLATELETS 237  --  347   NEUTROS ABS 8.65*  --  14.85*   IMMATURE GRANS (ABS) 0.06*  --   --    LYMPHS ABS 2.98  --   --    MONOS ABS 0.86  --   --    EOS ABS 1.63*  --  3.06*   .7*  --  105.5*   SED RATE  --   --  87*   CRP  --   --  12.77*   PROCALCITONIN  --   --  0.21   LACTATE  --  0.9 2.6*         Lab 23  0650 23  1201   SODIUM 134* 134*   POTASSIUM 4.3 3.9   CHLORIDE 102 96*   CO2 25.0 24.5   ANION GAP 7.0 13.5   BUN 14 21   CREATININE 0.62 0.77   EGFR 91.3 79.1   GLUCOSE 96 128*   CALCIUM 8.2* 9.0   MAGNESIUM  2.0 1.9         Lab 02/11/23  1201   TOTAL PROTEIN 8.0   ALBUMIN 3.9   GLOBULIN 4.1   ALT (SGPT) 8   AST (SGOT) 20   BILIRUBIN 0.7   ALK PHOS 141*   AMYLASE 44   LIPASE 20         Lab 02/11/23  1505 02/11/23  1201   HSTROP T 19* 21*                 Lab 02/11/23  1216   PH, ARTERIAL 7.407   PCO2, ARTERIAL 43.3   PO2 ART 50.2*   O2 SATURATION ART 86.0*   FIO2 21   HCO3 ART 27.3*   BASE EXCESS ART 2.2*   CARBOXYHEMOGLOBIN 4.4     Brief Urine Lab Results  (Last result in the past 365 days)      Color   Clarity   Blood   Leuk Est   Nitrite   Protein   CREAT   Urine HCG        02/11/23 1511 Yellow   Clear   Negative   Small (1+)   Negative   Negative                 Microbiology Results Abnormal     None          CT Abdomen Pelvis With Contrast    Result Date: 2/11/2023  CT Abdomen Pelvis W INDICATION: Epigastric abdominal pain and vomiting. TECHNIQUE: CT of the abdomen and pelvis with IV contrast. Coronal and sagittal reconstructions were obtained.  Radiation dose reduction techniques included automated exposure control or exposure modulation based on body size. Count of known CT and cardiac nuc med studies performed in previous 12 months: 6. COMPARISON: CT abdomen pelvis 1/26/2023 FINDINGS: Visualized lung bases are unremarkable. Abdomen: The spleen and pancreas are normal. Gallbladder is unremarkable. Mild prominence of the intrahepatic and extrahepatic bile ducts, similar to the prior exam. The liver is otherwise unremarkable. Both adrenal glands are normal. The kidneys are normal. Abdominal aorta is somewhat tortuous, but normal in caliber with extensive atherosclerotic calcification. No dissection. Small bowel is unremarkable without obstruction. Appendix not well visualized. No pericecal inflammation. The colon is unremarkable. No free fluid or free air. Pelvis: The urinary bladder is unremarkable.  Hysterectomy. No free pelvic fluid. No acute osseous abnormality.     Impression: 1. Redemonstrated mild prominence  of the intrahepatic and extrahepatic bile ducts, similar to the prior exam. No visible choledocholithiasis. Relation with liver function tests is recommended. GI consultation may be considered. 2. No other acute abdominal or pelvic findings. Signer Name: Frank Chris MD  Signed: 2/11/2023 2:13 PM  Workstation Name: [email or web address]  Radiology [facility]    XR Chest 1 View    Result Date: 2/11/2023  INDICATION: Sepsis TECHNIQUE: 1 views of the chest COMPARISON: Chest radiograph 1/1/2023 FINDINGS: Lungs/Pleura: Increased lung markings. No focal opacity. No effusions. Mediastinum: Normal mediastinal contours.  Heart is normal in size. Other: None     Impression: Stable chest with suspected airways disease Signer Name: Brayan Galindo MD  Signed: 2/11/2023 12:47 PM  Workstation Name: [email or web address]  [facility]    CT Angiogram Chest Pulmonary Embolism    Result Date: 2/11/2023  CT CHEST PULMONARY EMBOLISM W CONTRAST INDICATION: Shortness of air TECHNIQUE: CT angiogram of the chest with IV contrast. 3-D reconstructions were obtained and reviewed.   Radiation dose reduction techniques included automated exposure control or exposure modulation based on body size. Count of known CT and cardiac nuc med studies performed in previous 12 months: 6. COMPARISON: CT chest 12/31/2022 FINDINGS: Adequate opacification of the pulmonary arteries with no filling defects. Thoracic aorta normal in course and caliber without dissection. Heart size is normal. No pericardial effusion. No pleural effusion. No pneumothorax. No focal pulmonary infiltrates. Pulmonary emphysema. Visualized upper abdomen is unremarkable. No acute osseous abnormality.     Impression: 1. Negative for pulmonary embolus. 2. Negative for thoracic aortic aneurysm/dissection. 3. No acute pulmonary process. Pulmonary emphysema. Signer Name: Frank Chris MD  Signed: 2/11/2023 2:08 PM  Workstation Name: [email or web address]  [facility]    MRI abdomen wo contrast mrcp    Result Date: 2/11/2023  MRCP, NONCONTRAST, LIMITED, 2/11/2023 HISTORY: 78-year-old female presenting to the ED with abdominal pain, vomiting and sepsis. CT imaging showing bile duct dilatation. Total bilirubin level is normal, but alkaline phosphatase is elevated. TECHNIQUE: MRI examination of the upper abdomen was performed without gadolinium contrast administration. MRCP sequences were also performed. FINDINGS: Mild intrahepatic and moderate extrahepatic bile duct dilatation to the level of the ampulla. Upper CBD measures about 15 mm. There is a solitary small round filling defect in the distal CBD above the ampulla measuring about 3 mm (series #7/image 50; series #3/image 15). Distal common bile duct stone is likely. No other bile duct filling defects are identified. No pancreatic duct dilatation. Tiny amount of material layering within nondistended, mildly thick-walled gallbladder.     Impression: 1.  Suspected choledocholithiasis with a single small filling defect in distal common bile duct. 2.  Mild intrahepatic and moderate extrahepatic bile duct dilatation to the level of the ampulla. CBD 15 mm. 3.  Normal pancreatic duct. 4.  Material layering within the dependent portion of a nondistended, mildly thick-walled gallbladder. Signer Name: Kaz Peterson MD  Signed: 2/11/2023 4:31 PM  Workstation Name: [email or web address]  [facility]      Results for orders placed during the hospital encounter of 12/31/22    Adult Transthoracic Echo Complete W/ Cont if Necessary Per Protocol    Interpretation Summary  •  Left ventricular ejection fraction appears to be 61 - 65%.  •  Left ventricular wall thickness is consistent with mild septal asymmetric hypertrophy.  •  Left ventricular diastolic function is consistent with (grade I) impaired relaxation.  •  Left ventricular intracavitary gradient noted to be 70 mmHg.  This may represent  dynamic  proximal ventricular obstruction.  Clinical correlation suggested.      I have reviewed the medications:  Scheduled Meds:atorvastatin, 40 mg, Oral, Nightly  cefepime, 2 g, Intravenous, Q8H  ferrous sulfate, 325 mg, Oral, BID  gabapentin, 600 mg, Oral, Q8H  ipratropium-albuterol, 1.5 mL, Nebulization, Q6H While Awake - RT  sodium chloride, 10 mL, Intravenous, Q12H  traZODone, 25 mg, Oral, Nightly  verapamil SR, 180 mg, Oral, Daily      Continuous Infusions:sodium chloride, 75 mL/hr, Last Rate: 75 mL/hr (02/12/23 0519)      PRN Meds:.•  acetaminophen **OR** acetaminophen **OR** acetaminophen  •  HYDROmorphone  •  ondansetron **OR** ondansetron  •  sodium chloride  •  sodium chloride    Assessment & Plan   Assessment & Plan     Active Hospital Problems    Diagnosis  POA   • **Choledocholithiasis [K80.50]  Unknown   • Leukocytosis [D72.829]  Unknown   • HTN (hypertension) [I10]  Unknown   • HLD (hyperlipidemia) [E78.5]  Unknown   • Mesenteric ischemia, chronic (HCC) [K55.1]  Unknown   • COPD (chronic obstructive pulmonary disease) (HCC) [J44.9]  Unknown   • Tobacco abuse [Z72.0]  Unknown   • Paroxysmal atrial fibrillation (HCC) [I48.0]  Yes      Resolved Hospital Problems   No resolved problems to display.        Brief Hospital Course to date:  Yamile Angel is a 78 y.o. female with h/o HTN, HL, afib on eliquis, COPD, and chronic mesenteric ischemia who was sent as a transfer from the [facility] in [place].  Who states that she has had increased generalized abdominal pain for the last month, further increase in the last week.  She states that she went to the ED in [place] on 2/11/2023 due to 8/10 abdominal pain.  She describes the pain as cramping, sometimes sharp, located primarily at the epigastrium with some simultaneous pain in the right upper quadrant.  She denies any exacerbating or alleviating factors, though she does note that she has had decreased oral intake of food and fluids, noting  "that \"I cannot keep anything solid down,\" so she has not been attempting to eat or drink much in the last week.  MRCP at [place] showed apparent choledocholithiasis, so the patient was transferred here for possible ERCP.    Choledocholithiasis   Leukocytosis  -MRCP shows mild intrahepatic and moderate extrahepatic bile duct dilatation, suspected choledocholithiasis with small filling defect in distal common bile duct  -NPO  -Continue Cefepime  -Pain control and antiemetics  -Maintenance fluids  -Consult GI     HTN  Afib  -EKG shows ST  -Hold eliquis for possible ERCP  -Continue home verapamil     HLD  Chronic mesenteric ischemia  -Continue statin     COPD  Tobacco Abuse  -Duonebs  -Encourage cessation     Chronic Back Pain  -Continue home gabapentin  -Hold home percocet while getting dilaudid    Macrocytic Anemia  --check B12/folate      Expected Discharge Location and Transportation: home  Expected Discharge 2/14/2023       DVT prophylaxis:  Mechanical DVT prophylaxis orders are present.     AM-PAC 6 Clicks Score (PT): 21 (02/12/23 0000)    CODE STATUS:   Code Status and Medical Interventions:   Ordered at: 02/11/23 2592     Level Of Support Discussed With:    Patient     Code Status (Patient has no pulse and is not breathing):    CPR (Attempt to Resuscitate)     Medical Interventions (Patient has pulse or is breathing):    Full Support       Balta Hoover MD  02/12/23            "

## 2023-02-12 NOTE — CONSULTS
[facility]    Referring Provider: Saulo Beckham MD   PCP: Cristofer Garcia MD    Reason for Consultation: Choledocholithiasis     Chief complaint: Abdominal pain, nausea and vomiting     History of present illness:    Yamile Angel is a 78 y.o. female who is admitted with choledocholithiasis.  She presented as a transfer from [facility].   She describes a one week history of nausea, vomiting and post prandial abdominal pain.   She states she has overall felt miserable.   She denies fever nor chills.   MRCP was obtained which showed a single small filling defect in the distal common bile duct and mild intrahepatic and moderate extrahepatic biliary dilation.   Common bile duct measures 15 mm.      Allergies:  Penicillins and Sulfa antibiotics    Scheduled Meds:  atorvastatin, 40 mg, Oral, Nightly  cefepime, 2 g, Intravenous, Q8H  ferrous sulfate, 325 mg, Oral, BID  gabapentin, 600 mg, Oral, Q8H  ipratropium-albuterol, 1.5 mL, Nebulization, Q6H While Awake - RT  sodium chloride, 10 mL, Intravenous, Q12H  traZODone, 25 mg, Oral, Nightly  verapamil SR, 180 mg, Oral, Daily       Infusions:  sodium chloride, 75 mL/hr, Last Rate: 75 mL/hr (02/12/23 0519)      PRN Meds:  •  acetaminophen **OR** acetaminophen **OR** acetaminophen  •  HYDROmorphone  •  ondansetron **OR** ondansetron  •  sodium chloride  •  sodium chloride    Home Meds:  Medications Prior to Admission   Medication Sig Dispense Refill Last Dose   • albuterol sulfate  (90 Base) MCG/ACT inhaler Inhale 2 puffs by mouth every 4 (Four) Hours As Needed for Wheezing. 18 g 0 2/11/2023 at 2000   • apixaban (ELIQUIS) 5 MG tablet tablet Take 1 tablet by mouth 2 (Two) Times a Day. Indications: home medication 60 tablet 0 2/12/2023 at 0800   • atorvastatin (LIPITOR) 40 MG tablet Take 1 tablet by mouth Every Night. 30 tablet 0 2/11/2023 at 2000   • cyanocobalamin 1000 MCG/ML injection Inject 1,000 mcg under the skin into the  appropriate area as directed Every 30 (Thirty) Days.   2023   • ferrous sulfate 325 (65 FE) MG tablet Take 325 mg by mouth 2 (Two) Times a Day.   2023 at 0800   • gabapentin (NEURONTIN) 600 MG tablet Take 600 mg by mouth 3 (Three) Times a Day.   2023 at 0800   • oxyCODONE-acetaminophen (PERCOCET)  MG per tablet Take 1 tablet by mouth 4 (Four) Times a Day.   2023 at 1045   • traZODone (DESYREL) 50 MG tablet Take 1/2 tablet by mouth Every Night. 15 tablet 0 2023 at 2000   • verapamil SR (CALAN-SR) 180 MG CR tablet Take 1 tablet by mouth Daily. 60 tablet 3 2023 at 0800   • doxycycline (MONODOX) 100 MG capsule Take 1 capsule by mouth every 12 hours for 5 days as part of COPD Rescue Kit. (Only Start if in YELLOW ZONE.) 10 capsule 0 Unknown   • ipratropium-albuterol (DUO-NEB) 0.5-2.5 mg/3 ml nebulizer Take 3 mL by neb every 30 minutes as needed for shortness of air for up to 6 doses. Part of COPD Rescue Kit. (Only Start if in YELLOW ZONE.) 18 mL 0 Unknown   • ondansetron (ZOFRAN) 8 MG tablet Take 8 mg by mouth 2 (Two) Times a Day As Needed for Nausea or Vomiting.   Unknown   • [] potassium chloride 10 MEQ CR tablet Take 1 tablet by mouth 2 (Two) Times a Day for 15 days. 30 tablet 0    • predniSONE (DELTASONE) 20 MG tablet Take 2 tablets by mouth daily for 5 days as part of COPD Rescue Kit. (Only Start if in YELLOW ZONE.) (Patient not taking: Reported on 2023) 10 tablet 0 Not Taking       ROS: Review of Systems   Constitutional: Negative for chills and fever.   HENT: Negative.    Eyes: Negative.    Respiratory: Negative.    Cardiovascular: Negative.    Gastrointestinal: Positive for abdominal pain, nausea and vomiting.   Endocrine: Negative.    Genitourinary: Negative.    Musculoskeletal: Negative.    Skin: Negative.    Allergic/Immunologic: Negative.    Neurological: Negative.    Hematological: Negative.    Psychiatric/Behavioral: Negative.        PAST MED HX:  Past Medical  History:   Diagnosis Date   • Arthritis    • Choledocholithiasis 2/11/2023   • COPD (chronic obstructive pulmonary disease) (HCC)    • Elevated cholesterol    • HOCM (hypertrophic obstructive cardiomyopathy) ([facility]) 1/19/2023   • Hyperlipidemia    • Hypertension    • Sepsis with acute renal failure without septic shock, due to unspecified organism, unspecified acute renal failure type (HCC) 12/31/2022       PAST SURG HX:  Past Surgical History:   Procedure Laterality Date   • APPENDECTOMY     • COLONOSCOPY     • COLONOSCOPY N/A 12/15/2021    Procedure: COLONOSCOPY FOR SCREENING;  Surgeon: Rhonda Parada MD;  Location: [facility];  Service: Gastroenterology;  Laterality: N/A;   • EYE SURGERY     • HYSTERECTOMY     • SHOULDER SURGERY     • TUBAL ABDOMINAL LIGATION     • UPPER GASTROINTESTINAL ENDOSCOPY     • WRIST SURGERY         FAM HX:  Family History   Problem Relation Age of Onset   • Heart disease Mother    • Cancer Father        SOC HX:  Social History     Socioeconomic History   • Marital status:    Tobacco Use   • Smoking status: Every Day     Packs/day: 1.00     Years: 60.00     Pack years: 60.00     Types: Cigarettes   • Smokeless tobacco: Never   Vaping Use   • Vaping Use: Former   Substance and Sexual Activity   • Alcohol use: Never   • Drug use: Never   • Sexual activity: Never       PHYSICAL EXAM  /72 (BP Location: Right arm, Patient Position: Lying)   Pulse 107   Temp 98.1 °F (36.7 °C) (Oral)   Resp 20   SpO2 94%   Wt Readings from Last 3 Encounters:   02/11/23 66.7 kg (147 lb)   01/26/23 66.7 kg (147 lb)   01/19/23 67 kg (147 lb 12.8 oz)   ,body mass index is unknown because there is no height or weight on file.  Physical Exam  Constitutional:       General: She is not in acute distress.     Appearance: She is not toxic-appearing.   HENT:      Head: Normocephalic and atraumatic.      Mouth/Throat:      Mouth: Mucous membranes are moist.   Eyes:      General: No scleral  icterus.  Cardiovascular:      Rate and Rhythm: Normal rate and regular rhythm.   Pulmonary:      Effort: Pulmonary effort is normal. No respiratory distress.   Abdominal:      General: Bowel sounds are normal.      Palpations: Abdomen is soft.      Tenderness: There is abdominal tenderness in the right upper quadrant. There is no guarding or rebound.   Musculoskeletal:      Right lower leg: No edema.      Left lower leg: No edema.   Skin:     General: Skin is warm and dry.   Neurological:      Mental Status: She is alert and oriented to person, place, and time.   Psychiatric:         Behavior: Behavior normal.       Results Review:   I reviewed the patient's new clinical results.    Lab Results   Component Value Date    WBC 14.22 (H) 02/12/2023    HGB 9.7 (L) 02/12/2023    HGB 12.5 02/11/2023    HGB 10.9 (L) 01/26/2023    HCT 32.7 (L) 02/12/2023    .7 (H) 02/12/2023     02/12/2023     No results found for: INR    Lab Results   Component Value Date    GLUCOSE 96 02/12/2023    BUN 14 02/12/2023    CREATININE 0.62 02/12/2023    BCR 22.6 02/12/2023     (L) 02/12/2023    K 4.3 02/12/2023    CO2 25.0 02/12/2023    CALCIUM 8.2 (L) 02/12/2023    ALBUMIN 3.9 02/11/2023    ALKPHOS 141 (H) 02/11/2023    BILITOT 0.7 02/11/2023    ALT 8 02/11/2023    AST 20 02/11/2023     CT Abdomen/Pelvis with contrast: (as interpreted by radiologist)  Abdomen:   The spleen and pancreas are normal. Gallbladder is unremarkable. Mild prominence of the intrahepatic and extrahepatic bile ducts, similar to the prior exam. The liver is otherwise unremarkable. Both adrenal glands are normal. The kidneys are normal.  Abdominal aorta is somewhat tortuous, but normal in caliber with extensive atherosclerotic calcification. No dissection. Small bowel is unremarkable without obstruction. Appendix not well visualized. No pericecal inflammation. The colon is unremarkable.  No free fluid or free air.   Pelvis:   The urinary bladder is  unremarkable.  Hysterectomy. No free pelvic fluid.   No acute osseous abnormality.   IMPRESSION:   1. Redemonstrated mild prominence of the intrahepatic and extrahepatic bile ducts, similar to the prior exam. No visible choledocholithiasis. Relation with liver function tests is recommended. GI consultation may be considered.  2. No other acute abdominal or pelvic findings.    MRCP: (as interpreted by radiologist)  FINDINGS:  Mild intrahepatic and moderate extrahepatic bile duct dilatation to the level of the ampulla. Upper CBD measures about 15 mm.   There is a solitary small round filling defect in the distal CBD above the ampulla measuring about 3 mm (series #7/image 50; series #3/image 15). Distal common bile duct stone is likely. No other bile duct filling defects are identified.   No pancreatic duct dilatation. Tiny amount of material layering within nondistended, mildly thick-walled gallbladder.   IMPRESSION:  1.  Suspected choledocholithiasis with a single small filling defect in distal common bile duct.  2.  Mild intrahepatic and moderate extrahepatic bile duct dilatation to the level of the ampulla. CBD 15 mm.  3.  Normal pancreatic duct.  4.  Material layering within the dependent portion of a nondistended, mildly thick-walled gallbladder.    ASSESSMENTS/PLANS    1. Choledocholithiasis   2. Cholelithiasis   3. Abdominal pain, secondary to # 1  4. Nausea and vomiting, secondary to # 1   5. Atrial fibrillation, on chronic anticoagulation with Eliquis     >> Recommend ERCP tomorrow.   NPO at midnight.  Clear liquid diet today.     >> Hold Apixiban, last dose was 2/11/23 AM.     >> IV Cefepime    I discussed the patient's findings and my recommendations with patient    HILL Hollins  02/12/23  10:07 EST

## 2023-02-12 NOTE — PLAN OF CARE
Goal Outcome Evaluation:  Plan of Care Reviewed With: patient   VSS, 2L NC, A&Ox4. Pt NPO at midnight for ERCP tomorrow. IV antibiotics administered. Pt resting in bed. No further concerns at this time.

## 2023-02-12 NOTE — PLAN OF CARE
Goal Outcome Evaluation:              Outcome Evaluation: A&O x4, 2L NC, middle upper abd pain.  Dilaudid given for pain relief.  NPO after midnight.  Standby assist x 1.    Problem: Adult Inpatient Plan of Care  Goal: Plan of Care Review  Outcome: Ongoing, Progressing  Flowsheets (Taken 2/12/2023 0648)  Outcome Evaluation: A&O x4, 2L NC, middle upper abd pain.  Dilaudid given for pain relief.  NPO after midnight.  Goal: Patient-Specific Goal (Individualized)  Outcome: Ongoing, Progressing  Goal: Absence of Hospital-Acquired Illness or Injury  Outcome: Ongoing, Progressing  Intervention: Identify and Manage Fall Risk  Recent Flowsheet Documentation  Taken 2/12/2023 0600 by La Nichols, RN  Safety Promotion/Fall Prevention:   assistive device/personal items within reach   clutter free environment maintained   fall prevention program maintained   nonskid shoes/slippers when out of bed   room organization consistent   safety round/check completed  Taken 2/12/2023 0500 by La Nichols, RN  Safety Promotion/Fall Prevention:   assistive device/personal items within reach   clutter free environment maintained   fall prevention program maintained   nonskid shoes/slippers when out of bed   room organization consistent   safety round/check completed  Taken 2/12/2023 0400 by La Nichols, RN  Safety Promotion/Fall Prevention:   assistive device/personal items within reach   clutter free environment maintained   fall prevention program maintained   nonskid shoes/slippers when out of bed   room organization consistent   safety round/check completed  Taken 2/12/2023 0300 by La Nichols, RN  Safety Promotion/Fall Prevention:   assistive device/personal items within reach   clutter free environment maintained   fall prevention program maintained   nonskid shoes/slippers when out of bed   room organization consistent   safety round/check completed  Taken 2/12/2023 0200 by La Nichols, RN  Safety Promotion/Fall Prevention:    assistive device/personal items within reach   clutter free environment maintained   fall prevention program maintained   nonskid shoes/slippers when out of bed   room organization consistent   safety round/check completed  Taken 2/12/2023 0100 by La Nichols RN  Safety Promotion/Fall Prevention:   assistive device/personal items within reach   clutter free environment maintained   fall prevention program maintained   nonskid shoes/slippers when out of bed   room organization consistent   safety round/check completed  Taken 2/12/2023 0000 by La Nichols RN  Safety Promotion/Fall Prevention:   assistive device/personal items within reach   clutter free environment maintained   fall prevention program maintained   nonskid shoes/slippers when out of bed   room organization consistent   safety round/check completed  Intervention: Prevent Skin Injury  Recent Flowsheet Documentation  Taken 2/12/2023 0600 by La Nichols RN  Body Position: position changed independently  Skin Protection:   incontinence pads utilized   protective footwear used   tubing/devices free from skin contact  Taken 2/12/2023 0400 by La Nichols RN  Body Position: position changed independently  Skin Protection:   adhesive use limited   protective footwear used   tubing/devices free from skin contact  Taken 2/12/2023 0200 by La Nichols RN  Body Position: position changed independently  Skin Protection:   adhesive use limited   protective footwear used   tubing/devices free from skin contact  Taken 2/12/2023 0000 by La Nichols RN  Body Position: position changed independently  Skin Protection:   adhesive use limited   protective footwear used   tubing/devices free from skin contact  Intervention: Prevent and Manage VTE (Venous Thromboembolism) Risk  Recent Flowsheet Documentation  Taken 2/12/2023 0600 by La Nichols RN  Activity Management:   activity adjusted per tolerance   activity encouraged  Taken 2/12/2023 0400 by La Nichols  RN  Activity Management:   activity adjusted per tolerance   activity encouraged  Taken 2/12/2023 0200 by La Nichols RN  Activity Management:   activity adjusted per tolerance   activity encouraged  Taken 2/12/2023 0000 by La Nichols RN  Activity Management:   activity adjusted per tolerance   activity encouraged  VTE Prevention/Management:   bilateral   sequential compression devices off  Range of Motion: active ROM (range of motion) encouraged  Intervention: Prevent Infection  Recent Flowsheet Documentation  Taken 2/12/2023 0500 by La Nichols RN  Infection Prevention:   environmental surveillance performed   equipment surfaces disinfected   personal protective equipment utilized   rest/sleep promoted   single patient room provided  Taken 2/12/2023 0400 by La Nichols RN  Infection Prevention:   environmental surveillance performed   hand hygiene promoted   personal protective equipment utilized   rest/sleep promoted   single patient room provided  Taken 2/12/2023 0300 by La Nichols RN  Infection Prevention:   environmental surveillance performed   equipment surfaces disinfected   personal protective equipment utilized   rest/sleep promoted   single patient room provided  Taken 2/12/2023 0100 by La Nichols RN  Infection Prevention:   environmental surveillance performed   hand hygiene promoted   personal protective equipment utilized   rest/sleep promoted   single patient room provided  Taken 2/12/2023 0000 by La Nichols RN  Infection Prevention:   environmental surveillance performed   hand hygiene promoted   personal protective equipment utilized   rest/sleep promoted   single patient room provided  Goal: Optimal Comfort and Wellbeing  Outcome: Ongoing, Progressing  Intervention: Monitor Pain and Promote Comfort  Recent Flowsheet Documentation  Taken 2/12/2023 0000 by La Nichols RN  Pain Management Interventions: see MAR  Intervention: Provide Person-Centered Care  Recent Flowsheet  Documentation  Taken 2/12/2023 0000 by La Nichols, RN  Trust Relationship/Rapport:   care explained   choices provided   emotional support provided   empathic listening provided   questions answered   questions encouraged   thoughts/feelings acknowledged  Goal: Readiness for Transition of Care  Outcome: Ongoing, Progressing  Intervention: Mutually Develop Transition Plan  Recent Flowsheet Documentation  Taken 2/11/2023 2346 by La Nichols, RN  Transportation Anticipated: family or friend will provide  Patient/Family Anticipated Services at Transition: none  Patient/Family Anticipates Transition to: home  Taken 2/11/2023 2344 by La Nichols, RN  Equipment Currently Used at Home:   cane, straight   walker, rolling   shower chair

## 2023-02-12 NOTE — PROGRESS NOTES
Patient requested a visit with the . Pt stated to this writer that she would like prayer.  prayed according to her wishes.  provided supportive conversation, active listening and spiritual encouragement during this patient encounter.

## 2023-02-13 ENCOUNTER — APPOINTMENT (OUTPATIENT)
Dept: GENERAL RADIOLOGY | Facility: HOSPITAL | Age: 78
DRG: 444 | End: 2023-02-13
Payer: MEDICARE

## 2023-02-13 ENCOUNTER — READMISSION MANAGEMENT (OUTPATIENT)
Dept: CALL CENTER | Facility: HOSPITAL | Age: 78
End: 2023-02-13
Payer: MEDICARE

## 2023-02-13 ENCOUNTER — ANESTHESIA (OUTPATIENT)
Dept: GASTROENTEROLOGY | Facility: HOSPITAL | Age: 78
DRG: 444 | End: 2023-02-13
Payer: MEDICARE

## 2023-02-13 VITALS
HEART RATE: 77 BPM | BODY MASS INDEX: 21.55 KG/M2 | SYSTOLIC BLOOD PRESSURE: 154 MMHG | OXYGEN SATURATION: 94 % | TEMPERATURE: 97.6 F | DIASTOLIC BLOOD PRESSURE: 63 MMHG | WEIGHT: 137.6 LBS | RESPIRATION RATE: 17 BRPM

## 2023-02-13 LAB
ALBUMIN SERPL-MCNC: 3.3 G/DL (ref 3.5–5.2)
ALBUMIN/GLOB SERPL: 1.3 G/DL
ALP SERPL-CCNC: 104 U/L (ref 39–117)
ALT SERPL W P-5'-P-CCNC: 8 U/L (ref 1–33)
ANION GAP SERPL CALCULATED.3IONS-SCNC: 8 MMOL/L (ref 5–15)
AST SERPL-CCNC: 21 U/L (ref 1–32)
BILIRUB SERPL-MCNC: 0.5 MG/DL (ref 0–1.2)
BUN SERPL-MCNC: 10 MG/DL (ref 8–23)
BUN/CREAT SERPL: 20.8 (ref 7–25)
CALCIUM SPEC-SCNC: 8.3 MG/DL (ref 8.6–10.5)
CHLORIDE SERPL-SCNC: 102 MMOL/L (ref 98–107)
CO2 SERPL-SCNC: 28 MMOL/L (ref 22–29)
CREAT SERPL-MCNC: 0.48 MG/DL (ref 0.57–1)
DEPRECATED RDW RBC AUTO: 56.2 FL (ref 37–54)
EGFRCR SERPLBLD CKD-EPI 2021: 97.1 ML/MIN/1.73
ERYTHROCYTE [DISTWIDTH] IN BLOOD BY AUTOMATED COUNT: 14.2 % (ref 12.3–15.4)
GLOBULIN UR ELPH-MCNC: 2.6 GM/DL
GLUCOSE SERPL-MCNC: 97 MG/DL (ref 65–99)
HCT VFR BLD AUTO: 31.7 % (ref 34–46.6)
HGB BLD-MCNC: 9.8 G/DL (ref 12–15.9)
INR PPP: 1.15 (ref 0.84–1.13)
IRON 24H UR-MRATE: 21 MCG/DL (ref 37–145)
IRON SATN MFR SERPL: 10 % (ref 20–50)
MCH RBC QN AUTO: 32.9 PG (ref 26.6–33)
MCHC RBC AUTO-ENTMCNC: 30.9 G/DL (ref 31.5–35.7)
MCV RBC AUTO: 106.4 FL (ref 79–97)
PLATELET # BLD AUTO: 235 10*3/MM3 (ref 140–450)
PMV BLD AUTO: 9.6 FL (ref 6–12)
POTASSIUM SERPL-SCNC: 4.1 MMOL/L (ref 3.5–5.2)
PROT SERPL-MCNC: 5.9 G/DL (ref 6–8.5)
PROTHROMBIN TIME: 14.7 SECONDS (ref 11.4–14.4)
RBC # BLD AUTO: 2.98 10*6/MM3 (ref 3.77–5.28)
SODIUM SERPL-SCNC: 138 MMOL/L (ref 136–145)
TIBC SERPL-MCNC: 216 MCG/DL (ref 298–536)
TRANSFERRIN SERPL-MCNC: 145 MG/DL (ref 200–360)
VIT B12 BLD-MCNC: 575 PG/ML (ref 211–946)
WBC NRBC COR # BLD: 10.23 10*3/MM3 (ref 3.4–10.8)

## 2023-02-13 PROCEDURE — 88342 IMHCHEM/IMCYTCHM 1ST ANTB: CPT | Performed by: INTERNAL MEDICINE

## 2023-02-13 PROCEDURE — 80053 COMPREHEN METABOLIC PANEL: CPT | Performed by: INTERNAL MEDICINE

## 2023-02-13 PROCEDURE — 99239 HOSP IP/OBS DSCHRG MGMT >30: CPT | Performed by: INTERNAL MEDICINE

## 2023-02-13 PROCEDURE — 94799 UNLISTED PULMONARY SVC/PX: CPT

## 2023-02-13 PROCEDURE — 25010000002 IOPAMIDOL 61 % SOLUTION: Performed by: INTERNAL MEDICINE

## 2023-02-13 PROCEDURE — 25010000002 CEFEPIME PER 500 MG

## 2023-02-13 PROCEDURE — 25010000002 HYDROMORPHONE PER 4 MG: Performed by: INTERNAL MEDICINE

## 2023-02-13 PROCEDURE — 25010000002 ONDANSETRON PER 1 MG: Performed by: NURSE ANESTHETIST, CERTIFIED REGISTERED

## 2023-02-13 PROCEDURE — 74330 X-RAY BILE/PANC ENDOSCOPY: CPT

## 2023-02-13 PROCEDURE — 85027 COMPLETE CBC AUTOMATED: CPT | Performed by: INTERNAL MEDICINE

## 2023-02-13 PROCEDURE — 25010000002 PROPOFOL 10 MG/ML EMULSION: Performed by: NURSE ANESTHETIST, CERTIFIED REGISTERED

## 2023-02-13 PROCEDURE — 88305 TISSUE EXAM BY PATHOLOGIST: CPT | Performed by: INTERNAL MEDICINE

## 2023-02-13 PROCEDURE — 25010000002 DEXAMETHASONE PER 1 MG: Performed by: NURSE ANESTHETIST, CERTIFIED REGISTERED

## 2023-02-13 PROCEDURE — 84466 ASSAY OF TRANSFERRIN: CPT | Performed by: INTERNAL MEDICINE

## 2023-02-13 PROCEDURE — 0DB98ZX EXCISION OF DUODENUM, VIA NATURAL OR ARTIFICIAL OPENING ENDOSCOPIC, DIAGNOSTIC: ICD-10-PCS | Performed by: INTERNAL MEDICINE

## 2023-02-13 PROCEDURE — 25010000002 FENTANYL CITRATE (PF) 100 MCG/2ML SOLUTION: Performed by: NURSE ANESTHETIST, CERTIFIED REGISTERED

## 2023-02-13 PROCEDURE — 0F798ZZ DILATION OF COMMON BILE DUCT, VIA NATURAL OR ARTIFICIAL OPENING ENDOSCOPIC: ICD-10-PCS | Performed by: INTERNAL MEDICINE

## 2023-02-13 PROCEDURE — 43262 ENDO CHOLANGIOPANCREATOGRAPH: CPT | Performed by: INTERNAL MEDICINE

## 2023-02-13 PROCEDURE — 43239 EGD BIOPSY SINGLE/MULTIPLE: CPT | Performed by: INTERNAL MEDICINE

## 2023-02-13 PROCEDURE — 83540 ASSAY OF IRON: CPT | Performed by: INTERNAL MEDICINE

## 2023-02-13 PROCEDURE — BF101ZZ FLUOROSCOPY OF BILE DUCTS USING LOW OSMOLAR CONTRAST: ICD-10-PCS | Performed by: INTERNAL MEDICINE

## 2023-02-13 PROCEDURE — 0DB78ZX EXCISION OF STOMACH, PYLORUS, VIA NATURAL OR ARTIFICIAL OPENING ENDOSCOPIC, DIAGNOSTIC: ICD-10-PCS | Performed by: INTERNAL MEDICINE

## 2023-02-13 PROCEDURE — 82607 VITAMIN B-12: CPT | Performed by: INTERNAL MEDICINE

## 2023-02-13 PROCEDURE — 0FC98ZZ EXTIRPATION OF MATTER FROM COMMON BILE DUCT, VIA NATURAL OR ARTIFICIAL OPENING ENDOSCOPIC: ICD-10-PCS | Performed by: INTERNAL MEDICINE

## 2023-02-13 PROCEDURE — 94664 DEMO&/EVAL PT USE INHALER: CPT

## 2023-02-13 PROCEDURE — C1769 GUIDE WIRE: HCPCS | Performed by: INTERNAL MEDICINE

## 2023-02-13 PROCEDURE — 43264 ERCP REMOVE DUCT CALCULI: CPT | Performed by: INTERNAL MEDICINE

## 2023-02-13 PROCEDURE — 85610 PROTHROMBIN TIME: CPT | Performed by: INTERNAL MEDICINE

## 2023-02-13 RX ORDER — SODIUM CHLORIDE 0.9 % (FLUSH) 0.9 %
10 SYRINGE (ML) INJECTION AS NEEDED
Status: DISCONTINUED | OUTPATIENT
Start: 2023-02-13 | End: 2023-02-13

## 2023-02-13 RX ORDER — SODIUM CHLORIDE, SODIUM LACTATE, POTASSIUM CHLORIDE, CALCIUM CHLORIDE 600; 310; 30; 20 MG/100ML; MG/100ML; MG/100ML; MG/100ML
INJECTION, SOLUTION INTRAVENOUS CONTINUOUS PRN
Status: DISCONTINUED | OUTPATIENT
Start: 2023-02-13 | End: 2023-02-13 | Stop reason: SURG

## 2023-02-13 RX ORDER — LIDOCAINE HYDROCHLORIDE 10 MG/ML
INJECTION, SOLUTION EPIDURAL; INFILTRATION; INTRACAUDAL; PERINEURAL AS NEEDED
Status: DISCONTINUED | OUTPATIENT
Start: 2023-02-13 | End: 2023-02-13 | Stop reason: SURG

## 2023-02-13 RX ORDER — METRONIDAZOLE 500 MG/1
500 TABLET ORAL 3 TIMES DAILY
Qty: 21 TABLET | Refills: 0 | Status: SHIPPED | OUTPATIENT
Start: 2023-02-13

## 2023-02-13 RX ORDER — DEXAMETHASONE SODIUM PHOSPHATE 4 MG/ML
INJECTION, SOLUTION INTRA-ARTICULAR; INTRALESIONAL; INTRAMUSCULAR; INTRAVENOUS; SOFT TISSUE AS NEEDED
Status: DISCONTINUED | OUTPATIENT
Start: 2023-02-13 | End: 2023-02-13 | Stop reason: SURG

## 2023-02-13 RX ORDER — ROCURONIUM BROMIDE 10 MG/ML
INJECTION, SOLUTION INTRAVENOUS AS NEEDED
Status: DISCONTINUED | OUTPATIENT
Start: 2023-02-13 | End: 2023-02-13 | Stop reason: SURG

## 2023-02-13 RX ORDER — FENTANYL CITRATE 50 UG/ML
INJECTION, SOLUTION INTRAMUSCULAR; INTRAVENOUS AS NEEDED
Status: DISCONTINUED | OUTPATIENT
Start: 2023-02-13 | End: 2023-02-13 | Stop reason: SURG

## 2023-02-13 RX ORDER — PROPOFOL 10 MG/ML
VIAL (ML) INTRAVENOUS AS NEEDED
Status: DISCONTINUED | OUTPATIENT
Start: 2023-02-13 | End: 2023-02-13 | Stop reason: SURG

## 2023-02-13 RX ORDER — SODIUM CHLORIDE, SODIUM LACTATE, POTASSIUM CHLORIDE, CALCIUM CHLORIDE 600; 310; 30; 20 MG/100ML; MG/100ML; MG/100ML; MG/100ML
9 INJECTION, SOLUTION INTRAVENOUS CONTINUOUS
Status: DISCONTINUED | OUTPATIENT
Start: 2023-02-13 | End: 2023-02-13 | Stop reason: HOSPADM

## 2023-02-13 RX ORDER — CEFUROXIME AXETIL 500 MG/1
500 TABLET ORAL 2 TIMES DAILY
Qty: 14 TABLET | Refills: 0 | Status: SHIPPED | OUTPATIENT
Start: 2023-02-13

## 2023-02-13 RX ORDER — PANTOPRAZOLE SODIUM 40 MG/1
40 TABLET, DELAYED RELEASE ORAL 2 TIMES DAILY
Qty: 60 TABLET | Refills: 0 | Status: SHIPPED | OUTPATIENT
Start: 2023-02-13

## 2023-02-13 RX ORDER — POTASSIUM CHLORIDE 750 MG/1
10 TABLET, FILM COATED, EXTENDED RELEASE ORAL 2 TIMES DAILY
Qty: 30 TABLET | Refills: 0 | Status: SHIPPED | OUTPATIENT
Start: 2023-02-13 | End: 2023-02-28

## 2023-02-13 RX ORDER — ONDANSETRON 2 MG/ML
INJECTION INTRAMUSCULAR; INTRAVENOUS AS NEEDED
Status: DISCONTINUED | OUTPATIENT
Start: 2023-02-13 | End: 2023-02-13 | Stop reason: SURG

## 2023-02-13 RX ORDER — METOPROLOL TARTRATE 5 MG/5ML
INJECTION INTRAVENOUS AS NEEDED
Status: DISCONTINUED | OUTPATIENT
Start: 2023-02-13 | End: 2023-02-13 | Stop reason: SURG

## 2023-02-13 RX ORDER — FAMOTIDINE 10 MG/ML
20 INJECTION, SOLUTION INTRAVENOUS ONCE
Status: COMPLETED | OUTPATIENT
Start: 2023-02-13 | End: 2023-02-13

## 2023-02-13 RX ADMIN — IPRATROPIUM BROMIDE AND ALBUTEROL SULFATE 1.5 ML: 2.5; .5 SOLUTION RESPIRATORY (INHALATION) at 13:03

## 2023-02-13 RX ADMIN — DEXAMETHASONE SODIUM PHOSPHATE 4 MG: 4 INJECTION, SOLUTION INTRAMUSCULAR; INTRAVENOUS at 08:49

## 2023-02-13 RX ADMIN — HYDROCODONE BITARTRATE AND ACETAMINOPHEN 1 TABLET: 5; 325 TABLET ORAL at 13:01

## 2023-02-13 RX ADMIN — CEFEPIME 2 G: 2 INJECTION, POWDER, FOR SOLUTION INTRAVENOUS at 02:17

## 2023-02-13 RX ADMIN — Medication 10 ML: at 03:38

## 2023-02-13 RX ADMIN — Medication 10 ML: at 07:54

## 2023-02-13 RX ADMIN — SODIUM CHLORIDE, POTASSIUM CHLORIDE, SODIUM LACTATE AND CALCIUM CHLORIDE 9 ML/HR: 600; 310; 30; 20 INJECTION, SOLUTION INTRAVENOUS at 07:54

## 2023-02-13 RX ADMIN — SODIUM CHLORIDE, POTASSIUM CHLORIDE, SODIUM LACTATE AND CALCIUM CHLORIDE: 600; 310; 30; 20 INJECTION, SOLUTION INTRAVENOUS at 08:47

## 2023-02-13 RX ADMIN — ROCURONIUM BROMIDE 30 MG: 10 INJECTION INTRAVENOUS at 08:49

## 2023-02-13 RX ADMIN — HYDROMORPHONE HYDROCHLORIDE 0.5 MG: 1 INJECTION, SOLUTION INTRAMUSCULAR; INTRAVENOUS; SUBCUTANEOUS at 03:38

## 2023-02-13 RX ADMIN — FENTANYL CITRATE 100 MCG: 50 INJECTION, SOLUTION INTRAMUSCULAR; INTRAVENOUS at 08:47

## 2023-02-13 RX ADMIN — VERAPAMIL HYDROCHLORIDE 180 MG: 180 TABLET, FILM COATED, EXTENDED RELEASE ORAL at 10:35

## 2023-02-13 RX ADMIN — CEFEPIME 2 G: 2 INJECTION, POWDER, FOR SOLUTION INTRAVENOUS at 10:35

## 2023-02-13 RX ADMIN — FERROUS SULFATE TAB 325 MG (65 MG ELEMENTAL FE) 325 MG: 325 (65 FE) TAB at 10:35

## 2023-02-13 RX ADMIN — HYDROCODONE BITARTRATE AND ACETAMINOPHEN 1 TABLET: 5; 325 TABLET ORAL at 01:15

## 2023-02-13 RX ADMIN — ONDANSETRON 4 MG: 2 INJECTION INTRAMUSCULAR; INTRAVENOUS at 09:26

## 2023-02-13 RX ADMIN — METOPROLOL TARTRATE 5 MG: 1 INJECTION, SOLUTION INTRAVENOUS at 09:05

## 2023-02-13 RX ADMIN — METOPROLOL TARTRATE 5 MG: 1 INJECTION, SOLUTION INTRAVENOUS at 09:03

## 2023-02-13 RX ADMIN — GABAPENTIN 600 MG: 300 CAPSULE ORAL at 05:08

## 2023-02-13 RX ADMIN — FAMOTIDINE 20 MG: 10 INJECTION INTRAVENOUS at 07:56

## 2023-02-13 RX ADMIN — SUGAMMADEX 200 MG: 100 INJECTION, SOLUTION INTRAVENOUS at 09:26

## 2023-02-13 RX ADMIN — PROPOFOL 200 MG: 10 INJECTION, EMULSION INTRAVENOUS at 08:49

## 2023-02-13 RX ADMIN — LIDOCAINE HYDROCHLORIDE 50 MG: 10 INJECTION, SOLUTION EPIDURAL; INFILTRATION; INTRACAUDAL; PERINEURAL at 08:49

## 2023-02-13 NOTE — CONSULTS
Nutrition Services    Patient Name:  Yamile Angel  YOB: 1945  MRN: [id number]  Admit Date:  2/11/2023    Consult received and pt evaluated 2/12 Clr liq supplement added for pt when on clr liq diet. Full note 2/13 p ERCP.    Electronically signed by:  Yaquelin Willams RD  02/12/23 19:57 EST

## 2023-02-13 NOTE — PLAN OF CARE
Goal Outcome Evaluation:  Plan of Care Reviewed With: patient   MARIAH, [id number], RA. Pt had ERCP this morning. IV antibiotics administered. PRNs administered for pain. Pt to d/c home with family.

## 2023-02-13 NOTE — CASE MANAGEMENT/SOCIAL WORK
Continued Stay Note  [facility]     Patient Name: Yamile Angel  MRN: [id number]  Today's Date: 2/13/2023    Admit Date: 2/11/2023    Plan: Home at discharge   Discharge Plan     Row Name 02/13/23 1524       Plan    Plan Home at discharge    Final Discharge Disposition Code 01 - home or self-care    Final Note Patient discharged home prior to CM being able to see at bedside - She discharged home with family - no dc needs identified- leola [phone]               Discharge Codes    No documentation.               Expected Discharge Date and Time     Expected Discharge Date Expected Discharge Time    Feb 13, 2023             Leola Martins RN

## 2023-02-13 NOTE — DISCHARGE SUMMARY
"    [facility] Medicine Services  DISCHARGE SUMMARY    Patient Name: Yamile Angel  : 1945  MRN: [id number]    Date of Admission: 2023 11:24 PM  Date of Discharge:  2023  Primary Care Physician: Cristofer Garcia MD    Consults     Date and Time Order Name Status Description    2023 12:34 AM Inpatient Gastroenterology Consult Completed           Hospital Course     Presenting Problem:   Choledocholithiasis [K80.50]    Active Hospital Problems    Diagnosis  POA   • **Choledocholithiasis [K80.50]  Unknown   • Leukocytosis [D72.829]  Unknown   • HTN (hypertension) [I10]  Unknown   • HLD (hyperlipidemia) [E78.5]  Unknown   • Mesenteric ischemia, chronic (HCC) [K55.1]  Unknown   • COPD (chronic obstructive pulmonary disease) (HCC) [J44.9]  Unknown   • Tobacco abuse [Z72.0]  Unknown   • Paroxysmal atrial fibrillation (HCC) [I48.0]  Yes      Resolved Hospital Problems   No resolved problems to display.          Hospital Course:  Yamile Angel is a 78 y.o. female with h/o HTN, HL, afib on eliquis, COPD, and chronic mesenteric ischemia who was sent as a transfer from the [facility] in [place].  Who states that she has had increased generalized abdominal pain for the last month, further increase in the last week.  She states that she went to the ED in [place] on 2023 due to 8/10 abdominal pain.  She describes the pain as cramping, sometimes sharp, located primarily at the epigastrium with some simultaneous pain in the right upper quadrant.  She denies any exacerbating or alleviating factors, though she does note that she has had decreased oral intake of food and fluids, noting that \"I cannot keep anything solid down,\" so she has not been attempting to eat or drink much in the last week.  MRCP at [place] showed apparent choledocholithiasis, so the patient was transferred here for possible ERCP.     Choledocholithiasis   Leukocytosis  -MRCP shows mild " intrahepatic and moderate extrahepatic bile duct dilatation, suspected choledocholithiasis with small filling defect in distal common bile duct  -s/p ERCP and sphincterotomy on 2/13/23, balloon sweeps yielded a single small black stone  -s/p EGD on 2/13/23 that a 2x3cm ulcer in the gastric fundus, and diffuse moderate gastritis, also scalloping and superficial linear ulcers in the duodenum.  Biopsy pending for H. Pylori, and for celiac disease.  Recs PPI BID, and surveillance EGD to assess healing in 8-12 weeks  -transition Cefepime to ceftin and flagyl outpatient     HTN  Afib  -EKG shows ST  -Hold eliquis for possible ERCP  -Continue home verapamil     HLD  Chronic mesenteric ischemia  -Continue statin     COPD  Tobacco Abuse  -Encourage cessation     Chronic Back Pain  -Continue home gabapentin  -continue home percocet PRN     Macrocytic Anemia  --B12/folate wnl    Discharge Follow Up Recommendations for outpatient labs/diagnostics:  f/u with PCP in 1 week  F/u with GI for surveillance EGD to assess ulcer healing in 8-12 weeks    Day of Discharge     HPI:   S/p ERCP this AM and EGD that showed ulcer in the gastric fundus and diffuse moderate gastritis.  Belly pain better this AM.  Tolerated lunch and feels ready to go home.      Review of Systems  Gen- No fevers, chills  CV- No chest pain, palpitations  Resp- No cough, dyspnea  GI- No N/V/D, abd pain        Vital Signs:   Temp:  [97.4 °F (36.3 °C)-98.4 °F (36.9 °C)] 97.6 °F (36.4 °C)  Heart Rate:  [] 77  Resp:  [16-20] 17  BP: ()/() 154/63  Flow (L/min):  [2] 2      Physical Exam:  Constitutional: No acute distress, awake, alert, family at bedside  HENT: NCAT, mucous membranes moist  Respiratory: Clear to auscultation bilaterally, respiratory effort normal   Cardiovascular: RRR, no murmurs, rubs, or gallops  Gastrointestinal: Positive bowel sounds, soft, nontender, nondistended  Musculoskeletal: No bilateral ankle edema  Psychiatric: Appropriate  affect, cooperative  Neurologic: Oriented x 3, strength symmetric in all extremities, Cranial Nerves grossly intact to confrontation, speech clear  Skin: No rashes      Pertinent  and/or Most Recent Results     LAB RESULTS:      Lab 02/13/23  0627 02/12/23  0650 02/11/23  1505 02/11/23  1201   WBC 10.23 14.22*  --  23.57*   HEMOGLOBIN 9.8* 9.7*  --  12.5   HEMATOCRIT 31.7* 32.7*  --  40.1   PLATELETS 235 237  --  347   NEUTROS ABS  --  8.65*  --  14.85*   IMMATURE GRANS (ABS)  --  0.06*  --   --    LYMPHS ABS  --  2.98  --   --    MONOS ABS  --  0.86  --   --    EOS ABS  --  1.63*  --  3.06*   .4* 109.7*  --  105.5*   SED RATE  --   --   --  87*   CRP  --   --   --  12.77*   PROCALCITONIN  --   --   --  0.21   LACTATE  --   --  0.9 2.6*   PROTIME 14.7*  --   --   --          Lab 02/13/23  0627 02/12/23  0650 02/11/23  1201   SODIUM 138 134* 134*   POTASSIUM 4.1 4.3 3.9   CHLORIDE 102 102 96*   CO2 28.0 25.0 24.5   ANION GAP 8.0 7.0 13.5   BUN 10 14 21   CREATININE 0.48* 0.62 0.77   EGFR 97.1 91.3 79.1   GLUCOSE 97 96 128*   CALCIUM 8.3* 8.2* 9.0   MAGNESIUM  --  2.0 1.9         Lab 02/13/23  0627 02/11/23  1201   TOTAL PROTEIN 5.9* 8.0   ALBUMIN 3.3* 3.9   GLOBULIN 2.6 4.1   ALT (SGPT) 8 8   AST (SGOT) 21 20   BILIRUBIN 0.5 0.7   ALK PHOS 104 141*   AMYLASE  --  44   LIPASE  --  20         Lab 02/13/23  0627 02/11/23  1505 02/11/23  1201   HSTROP T  --  19* 21*   PROTIME 14.7*  --   --    INR 1.15*  --   --              Lab 02/13/23  0627 02/12/23  1042 02/12/23  0650   IRON 21*  --   --    IRON SATURATION 10*  --   --    TIBC 216*  --   --    TRANSFERRIN 145*  --   --    FERRITIN  --   --  200.30*   FOLATE  --  5.86  --    VITAMIN B 12 575  --   --          Lab 02/11/23  1216   PH, ARTERIAL 7.407   PCO2, ARTERIAL 43.3   PO2 ART 50.2*   O2 SATURATION ART 86.0*   FIO2 21   HCO3 ART 27.3*   BASE EXCESS ART 2.2*   CARBOXYHEMOGLOBIN 4.4     Brief Urine Lab Results  (Last result in the past 365 days)      Color    Clarity   Blood   Leuk Est   Nitrite   Protein   CREAT   Urine HCG        02/11/23 1511 Yellow   Clear   Negative   Small (1+)   Negative   Negative               Microbiology Results (last 10 days)     Procedure Component Value - Date/Time    COVID PRE-OP / PRE-PROCEDURE SCREENING ORDER (NO ISOLATION) - Swab, Nasopharynx [[id number]]  (Normal) Collected: 02/11/23 1233    Lab Status: Final result Specimen: Swab from Nasopharynx Updated: 02/11/23 1306    Narrative:      The following orders were created for panel order COVID PRE-OP / PRE-PROCEDURE SCREENING ORDER (NO ISOLATION) - Swab, Nasopharynx.  Procedure                               Abnormality         Status                     ---------                               -----------         ------                     COVID-19 and FLU A/B PCR...[[id number]]  Normal              Final result                 Please view results for these tests on the individual orders.    COVID-19 and FLU A/B PCR - Swab, Nasopharynx [[id number]]  (Normal) Collected: 02/11/23 1233    Lab Status: Final result Specimen: Swab from Nasopharynx Updated: 02/11/23 1306     COVID19 Not Detected     Influenza A PCR Not Detected     Influenza B PCR Not Detected    Narrative:      Fact sheet for providers: https://www.fda.gov/media/191736/download    Fact sheet for patients: https://www.fda.gov/media/550986/download    Test performed by PCR.    Blood Culture - Blood, Arm, Right [[id number]]  (Normal) Collected: 02/11/23 1232    Lab Status: Preliminary result Specimen: Blood from Arm, Right Updated: 02/13/23 1245     Blood Culture No growth at 2 days    Blood Culture - Blood, Arm, Left [[id number]]  (Normal) Collected: 02/11/23 1228    Lab Status: Preliminary result Specimen: Blood from Arm, Left Updated: 02/13/23 1245     Blood Culture No growth at 2 days          CT Abdomen Pelvis With Contrast    Result Date: 2/11/2023  CT Abdomen Pelvis W INDICATION: Epigastric abdominal pain and vomiting.  TECHNIQUE: CT of the abdomen and pelvis with IV contrast. Coronal and sagittal reconstructions were obtained.  Radiation dose reduction techniques included automated exposure control or exposure modulation based on body size. Count of known CT and cardiac nuc med studies performed in previous 12 months: 6. COMPARISON: CT abdomen pelvis 1/26/2023 FINDINGS: Visualized lung bases are unremarkable. Abdomen: The spleen and pancreas are normal. Gallbladder is unremarkable. Mild prominence of the intrahepatic and extrahepatic bile ducts, similar to the prior exam. The liver is otherwise unremarkable. Both adrenal glands are normal. The kidneys are normal. Abdominal aorta is somewhat tortuous, but normal in caliber with extensive atherosclerotic calcification. No dissection. Small bowel is unremarkable without obstruction. Appendix not well visualized. No pericecal inflammation. The colon is unremarkable. No free fluid or free air. Pelvis: The urinary bladder is unremarkable.  Hysterectomy. No free pelvic fluid. No acute osseous abnormality.     1. Redemonstrated mild prominence of the intrahepatic and extrahepatic bile ducts, similar to the prior exam. No visible choledocholithiasis. Relation with liver function tests is recommended. GI consultation may be considered. 2. No other acute abdominal or pelvic findings. Signer Name: Frank Chris MD  Signed: 2/11/2023 2:13 PM  Workstation Name: [email or web address]  [facility] ERCP pancreatic and biliary ducts    Result Date: 2/13/2023  FL ERCP PANCREATIC AND BILIARY DUCTS Date of Exam: 2/13/2023 8:45 AM EST Indication: ERCP. Comparison: None available. Technique:  A series of radiographic digital spot films were obtained in conjunction with a endoscopic catheterization of the biliary and pancreatic ductal system, performed by the gastroenterologist. Fluoroscopic Time: 1 minute 20 seconds Number of Images: 4 Findings: Dictation is to record 1 minute and  20 seconds of fluoroscopy time. 4 intraprocedural images obtained show endoscope and side cannula placement with contrast injection of the common duct. Please see the procedure report for full details.     Impression: Fluoroscopy provided during ERCP. Electronically Signed: Sherwin Cruzd  2/13/2023 10:57 AM EST  Workstation ID: [id number]    XR Chest 1 View    Result Date: 2/11/2023  INDICATION: Sepsis TECHNIQUE: 1 views of the chest COMPARISON: Chest radiograph 1/1/2023 FINDINGS: Lungs/Pleura: Increased lung markings. No focal opacity. No effusions. Mediastinum: Normal mediastinal contours.  Heart is normal in size. Other: None     Stable chest with suspected airways disease Signer Name: Brayan Galindo MD  Signed: 2/11/2023 12:47 PM  Workstation Name: [id number]  [facility]    CT Angiogram Chest Pulmonary Embolism    Result Date: 2/11/2023  CT CHEST PULMONARY EMBOLISM W CONTRAST INDICATION: Shortness of air TECHNIQUE: CT angiogram of the chest with IV contrast. 3-D reconstructions were obtained and reviewed.   Radiation dose reduction techniques included automated exposure control or exposure modulation based on body size. Count of known CT and cardiac nuc med studies performed in previous 12 months: 6. COMPARISON: CT chest 12/31/2022 FINDINGS: Adequate opacification of the pulmonary arteries with no filling defects. Thoracic aorta normal in course and caliber without dissection. Heart size is normal. No pericardial effusion. No pleural effusion. No pneumothorax. No focal pulmonary infiltrates. Pulmonary emphysema. Visualized upper abdomen is unremarkable. No acute osseous abnormality.     1. Negative for pulmonary embolus. 2. Negative for thoracic aortic aneurysm/dissection. 3. No acute pulmonary process. Pulmonary emphysema. Signer Name: Frank Chirs MD  Signed: 2/11/2023 2:08 PM  Workstation Name: [id number]  [facility]    MRI abdomen wo contrast  mrcp    Result Date: 2/11/2023  MRCP, NONCONTRAST, LIMITED, 2/11/2023 HISTORY: 78-year-old female presenting to the ED with abdominal pain, vomiting and sepsis. CT imaging showing bile duct dilatation. Total bilirubin level is normal, but alkaline phosphatase is elevated. TECHNIQUE: MRI examination of the upper abdomen was performed without gadolinium contrast administration. MRCP sequences were also performed. FINDINGS: Mild intrahepatic and moderate extrahepatic bile duct dilatation to the level of the ampulla. Upper CBD measures about 15 mm. There is a solitary small round filling defect in the distal CBD above the ampulla measuring about 3 mm (series #7/image 50; series #3/image 15). Distal common bile duct stone is likely. No other bile duct filling defects are identified. No pancreatic duct dilatation. Tiny amount of material layering within nondistended, mildly thick-walled gallbladder.     1.  Suspected choledocholithiasis with a single small filling defect in distal common bile duct. 2.  Mild intrahepatic and moderate extrahepatic bile duct dilatation to the level of the ampulla. CBD 15 mm. 3.  Normal pancreatic duct. 4.  Material layering within the dependent portion of a nondistended, mildly thick-walled gallbladder. Signer Name: Kaz Peterson MD  Signed: 2/11/2023 4:31 PM  Workstation Name: [email or web address]  [facility]              Results for orders placed during the hospital encounter of 12/31/22    Adult Transthoracic Echo Complete W/ Cont if Necessary Per Protocol    Interpretation Summary  •  Left ventricular ejection fraction appears to be 61 - 65%.  •  Left ventricular wall thickness is consistent with mild septal asymmetric hypertrophy.  •  Left ventricular diastolic function is consistent with (grade I) impaired relaxation.  •  Left ventricular intracavitary gradient noted to be 70 mmHg.  This may represent dynamic  proximal ventricular obstruction.  Clinical  correlation suggested.      Plan for Follow-up of Pending Labs/Results:   Pending Labs     Order Current Status    Tissue Pathology Exam In process        Discharge Details        Discharge Medications      New Medications      Instructions Start Date   cefuroxime 500 MG tablet  Commonly known as: CEFTIN   500 mg, Oral, 2 Times Daily      metroNIDAZOLE 500 MG tablet  Commonly known as: Flagyl   500 mg, Oral, 3 Times Daily      pantoprazole 40 MG EC tablet  Commonly known as: Protonix   40 mg, Oral, 2 Times Daily         Continue These Medications      Instructions Start Date   albuterol sulfate  (90 Base) MCG/ACT inhaler  Commonly known as: PROVENTIL HFA;VENTOLIN HFA;PROAIR HFA   Inhale 2 puffs by mouth every 4 (Four) Hours As Needed for Wheezing.      atorvastatin 40 MG tablet  Commonly known as: LIPITOR   40 mg, Oral, Nightly      cyanocobalamin 1000 MCG/ML injection   1,000 mcg, Subcutaneous, Every 30 Days      Eliquis 5 MG tablet tablet  Generic drug: apixaban   5 mg, Oral, 2 Times Daily      ferrous sulfate 325 (65 FE) MG tablet   325 mg, Oral, 2 Times Daily      gabapentin 600 MG tablet  Commonly known as: NEURONTIN   600 mg, Oral, 3 Times Daily      ipratropium-albuterol 0.5-2.5 mg/3 ml nebulizer  Commonly known as: DUO-NEB   Take 3 mL by neb every 30 minutes as needed for shortness of air for up to 6 doses. Part of COPD Rescue Kit. (Only Start if in YELLOW ZONE.)      ondansetron 8 MG tablet  Commonly known as: ZOFRAN   8 mg, Oral, 2 Times Daily PRN      oxyCODONE-acetaminophen  MG per tablet  Commonly known as: PERCOCET   1 tablet, Oral, 4 Times Daily      potassium chloride 10 MEQ CR tablet   10 mEq, Oral, 2 Times Daily      traZODone 50 MG tablet  Commonly known as: DESYREL   Take 1/2 tablet by mouth Every Night.      verapamil  MG CR tablet  Commonly known as: CALAN-SR   180 mg, Oral, Daily         Stop These Medications    doxycycline 100 MG capsule  Commonly known as: MONODOX      predniSONE 20 MG tablet  Commonly known as: DELTASONE            Allergies   Allergen Reactions   • Penicillins Rash   • Sulfa Antibiotics Rash         Discharge Disposition:      Diet:  Hospital:  Diet Order   Procedures   • Diet: Gastrointestinal Diets; Fat-Restricted; Texture: Regular Texture (IDDSI 7); Fluid Consistency: Thin (IDDSI 0)       Activity:      Restrictions or Other Recommendations:         CODE STATUS:    Code Status and Medical Interventions:   Ordered at: 02/11/23 0304     Level Of Support Discussed With:    Patient     Code Status (Patient has no pulse and is not breathing):    CPR (Attempt to Resuscitate)     Medical Interventions (Patient has pulse or is breathing):    Full Support       Future Appointments   Date Time Provider Department Center   2/22/2023  1:00 PM Jo Ann Lua APRN [facility]       Additional Instructions for the Follow-ups that You Need to Schedule     Discharge Follow-up with PCP   As directed       Currently Documented PCP:    Cristofer Garcia MD    PCP Phone Number:    [phone]     Follow Up Details: with PCP in 1 week         Discharge Follow-up with Specified Provider: with GI; 6 Weeks   As directed      To: with GI    Follow Up: 6 Weeks    Follow Up Details: for surveillance EGD for ulcer in 6-8 weeks                     Balta Hoover MD  02/13/23      Time Spent on Discharge:  I spent  35 minutes on this discharge activity which included: face-to-face encounter with the patient, reviewing the data in the system, coordination of the care with the nursing staff as well as consultants, documentation, and entering orders.

## 2023-02-13 NOTE — PROGRESS NOTES
Clinical Nutrition     Nutrition Support Assessment  Reason for Visit: Malnutrition Severity Assessment, Physician consult lcharting for 2/12 now p ERCP      Patient Name: Yamile Angel  YOB: 1945  MRN: [id number]  Date of Encounter: 02/13/23 16:34 EST  Admission date: 2/11/2023    Comments: Pt meets criteria for severe acute malnutrition based on wt intake hx w wasting. See MSA note.    Nutrition Assessment   Admission Diagnosis:  Choledocholithiasis [K80.50]      Problem List:    Choledocholithiasis    Paroxysmal atrial fibrillation (HCC)    Leukocytosis    HTN (hypertension)    HLD (hyperlipidemia)    Mesenteric ischemia, chronic (HCC)    COPD (chronic obstructive pulmonary disease) (HCC)    Tobacco abuse   Macrocytic anemia    PMH:   She  has a past medical history of Arthritis, Choledocholithiasis (2/11/2023), COPD (chronic obstructive pulmonary disease) ([facility]), Elevated cholesterol, HOCM (hypertrophic obstructive cardiomyopathy) ([facility]) (1/19/2023), Hyperlipidemia, Hypertension, and Sepsis with acute renal failure without septic shock, due to unspecified organism, unspecified acute renal failure type ([facility]) (12/31/2022).    PSH:  She  has a past surgical history that includes Colonoscopy; Upper gastrointestinal endoscopy; Hysterectomy; Tubal ligation; Appendectomy; Eye surgery; Shoulder surgery; Wrist surgery; and Colonoscopy (N/A, 12/15/2021).      Applicable Nutrition Concerns:   Skin:  Oral:  GI:      Applicable Interval History:   2/13 ERCP found fundic ulcer duodenal ulcers, bx taken      Reported/Observed/Food/Nutrition Related History:     Pt rpt unable to eat for several days - if took food unable to keep down. Aware of wt loss.      Labs    Labs Reviewed: Yes     Results from last 7 days   Lab Units 02/13/23  0627 02/12/23  0650 02/11/23  1201   GLUCOSE mg/dL 97 96 128*   BUN mg/dL 10 14 21   CREATININE mg/dL 0.48* 0.62 0.77   SODIUM mmol/L 138 134* 134*   CHLORIDE  mmol/L 102 102 96*   POTASSIUM mmol/L 4.1 4.3 3.9   MAGNESIUM mg/dL  --  2.0 1.9   ALT (SGPT) U/L 8  --  8       Results from last 7 days   Lab Units 02/13/23 0627 02/11/23  1201   ALBUMIN g/dL 3.3* 3.9   CRP mg/dL  --  12.77*           No results found for: HGBA1C              Medications    Medications Reviewed: Yes  Pertinent: Abx, FeSO4  Infusion:  PRN:       Intake/Ouptut 24 hrs (0701 - 0700)   I&O's Reviewed: Yes     Intake & Output (last day)       02/12 0701 02/13 0700 02/13 0701 02/14 0700    P.O. 350 240    I.V. (mL/kg)  750 (12)    Total Intake(mL/kg) 350 (5.6) 990 (15.9)    Net +350 +990          Urine Unmeasured Occurrence 5 x 2 x          Anthropometrics     Admission Height --   Admission Weight 62.4 kg (137 lb 9.6 oz) Documented at 02/12/2023 0814       Height:   170.2 cm, 67 in   Last Filed Weight: Weight: 62.4 kg (137 lb 9.6 oz) (02/12/23 0814)  Method:  confirmed per pt.  BMI: BMI (Calculated): 21.5  BMI classification: Normal: 18.5-24.9kg/m2  IBW:      UBW: Per EMR Wt of 156 lbs on 12/19/22 Wt of 147 lb on 1/18/23w  Weight change: 10 lb loss 7% body wt from 1/18 - over 3 weeks avg 2.3%/week    Nutrition Focused Physical Exam     Date: 2/12    Patient meets criteria for severe acute malnutrition diagnosis, see MSA note.    Current Nutrition Prescription     PO: Diet: Gastrointestinal Diets; Fat-Restricted; Texture: Regular Texture (IDDSI 7); Fluid Consistency: Thin (IDDSI 0)  Oral Nutrition Supplement: Boost Breeze when on clear liquid diet  Intake: Insufficient data 38% x 2 meals recorded      Nutrition Diagnosis   Date: 2/12 Updated:   Problem Malnutrition  sever acute   Etiology Alt GI Fx   Signs/Symptoms Wt / Intake hx w wasting   Status:     Goal:   General: Nutrition to support treatment  PO: Establish PO  EN/PN: N/A    Nutrition Intervention      Follow treatment progress, Care plan reviewed, Advise alternate selection, Supplement provided when on clr liquid      Monitoring/Evaluation:    Per protocol, I&O, PO intake, Supplement intake, Pertinent labs, Weight, GI status, Symptoms      Yaquelin Willams RD  Time Spent: 30 min

## 2023-02-13 NOTE — ANESTHESIA PREPROCEDURE EVALUATION
Anesthesia Evaluation     Patient summary reviewed and Nursing notes reviewed   no history of anesthetic complications:  NPO Solid Status: > 8 hours  NPO Liquid Status: > 2 hours           Airway   Mallampati: II  TM distance: >3 FB  Neck ROM: full  No difficulty expected  Dental    (+) upper dentures    Pulmonary - normal exam   (+) a smoker Current, COPD moderate,   Cardiovascular - normal exam    ECG reviewed  PT is on anticoagulation therapy    (+) hypertension, dysrhythmias Atrial Fib, hyperlipidemia,     ROS comment: EKG ST rate 103    1/1/23- lvef 61-65, LV intracavitary gradient 70mmhg  - poss dynamic  proximal ventricular obstruction, gr1dd, nl rvsf/sz, nl valves    Neuro/Psych  (-) seizures, CVA  GI/Hepatic/Renal/Endo    (+) obesity,   renal disease,     Musculoskeletal     (+) back pain, chronic pain,   Abdominal    Substance History - negative use     OB/GYN          Other        ROS/Med Hx Other: Chronic mesenteric ischemia  hgb 9.7 k 4.3  choledocholithiasis.                  Anesthesia Plan    ASA 3     general     (Risks and benefits of GETA discussed with patient (including MI, CVA, death, recall, aspiration, oropharyngeal/dental damage), questions answered, agreeable to proceed.    HOCM - Keep NSR/avoid tachycardia  )  intravenous induction     Anesthetic plan, risks, benefits, and alternatives have been provided, discussed and informed consent has been obtained with: patient.    Use of blood products discussed with patient .   Plan discussed with CRNA.        CODE STATUS:    Level Of Support Discussed With: Patient  Code Status (Patient has no pulse and is not breathing): CPR (Attempt to Resuscitate)  Medical Interventions (Patient has pulse or is breathing): Full Support

## 2023-02-13 NOTE — BRIEF OP NOTE
ENDOSCOPIC RETROGRADE CHOLANGIOPANCREATOGRAPHY WITH STENT PLACEMENT AT BEDSIDE  Progress Note    Yamile Angel  2/13/2023    Single-pass wire cannulation of common bile duct achieved.  Cholangiogram revealed filling defect in the distal common bile duct.  Intrahepatic and common bile ducts were moderately dilated.  Sphincterotomy performed without bleeding.  Balloon sweeps yielded a single small black stone.  Bile drained freely at conclusion of procedure.    A large fundic ulcer (image below) was noted upon intubation with duodenal scope.  After ERCP, an EGD scope was used to define a 2 x 3 cm ulcer in the gastric fundus. Diffuse moderate gastritis was noted.  There was scalloping and superficial linear ulcers in the duodenum.  Findings suggest NSAID use.  Antrum biopsies taken for H. pylori.  Biopsies taken of the fundic ulcer for histology.  Biopsies taken in the duodenum to assess for celiac disease.    >> Twice daily PPI.  >> Await pathology results.  >> Surveillance EGD to assess ulcer healing in 8 to 12 weeks.      Mark I. Brunner, MD     Date: 2/13/2023  Time: 09:25 EST

## 2023-02-13 NOTE — ANESTHESIA PROCEDURE NOTES
Airway  Urgency: elective    Date/Time: 2/13/2023 8:52 AM  Airway not difficult    General Information and Staff    Patient location during procedure: OR  CRNA/CAA: Collette Griffin CRNA    Indications and Patient Condition  Indications for airway management: airway protection    Preoxygenated: yes  MILS not maintained throughout  Mask difficulty assessment: 1 - vent by mask    Final Airway Details  Final airway type: endotracheal airway      Successful airway: ETT  Cuffed: yes   Successful intubation technique: direct laryngoscopy  Endotracheal tube insertion site: oral  Blade: Graham  Blade size: 3  ETT size (mm): 7.0  Cormack-Lehane Classification: grade I - full view of glottis  Placement verified by: chest auscultation and capnometry   Measured from: lips  ETT/EBT  to lips (cm): 20  Number of attempts at approach: 1  Assessment: lips, teeth, and gum same as pre-op and atraumatic intubation    Additional Comments  Negative epigastric sounds, Breath sound equal bilaterally with symmetric chest rise and fall

## 2023-02-13 NOTE — ANESTHESIA POSTPROCEDURE EVALUATION
Patient: Yamile Angel    Procedure Summary     Date: 02/13/23 Room / Location: [facility] ENDOSCOPY 3 /  [facility]    Anesthesia Start: 0847 Anesthesia Stop: 0936    Procedure: ENDOSCOPIC RETROGRADE CHOLANGIOPANCREATOGRAPHY Diagnosis:     Surgeons: Brunner, Mark I, MD Provider: Linda Valiente DO    Anesthesia Type: general ASA Status: 3          Anesthesia Type: general    Vitals  No vitals data found for the desired time range.  100%  101/107          Post Anesthesia Care and Evaluation    Patient location during evaluation: PACU  Patient participation: complete - patient participated  Level of consciousness: awake  Pain management: adequate    Airway patency: patent  Anesthetic complications: No anesthetic complications  PONV Status: none  Cardiovascular status: hemodynamically stable and acceptable  Respiratory status: nonlabored ventilation, acceptable and nasal cannula  Hydration status: acceptable

## 2023-02-13 NOTE — PROGRESS NOTES
Malnutrition Severity Assessment    Patient Name:  Yamile Angel  YOB: 1945  MRN: [id number]  Admit Date:  2/11/2023    Patient meets criteria for : Severe Malnutrition (Pt meets criteria for severe acute malnutrition based on wt intake hx w wasting.)    Comments:      Malnutrition Severity Assessment  Malnutrition Type: Acute Disease or Injury - Related Malnutrition  Malnutrition Type (last 8 hours)     Malnutrition Severity Assessment     Row Name 02/13/23 1642       Malnutrition Severity Assessment    Malnutrition Type Acute Disease or Injury - Related Malnutrition    Row Name 02/13/23 1642       Insufficient Energy Intake     Insufficient Energy Intake Findings Severe    Insufficient Energy Intake  < or equal to 50% of est. energy requirement for > or equal to 5d)    Row Name 02/13/23 1642       Unintentional Weight Loss     Unintentional Weight Loss Findings Severe    Unintentional Weight Loss  Weight loss greater than 2% in one week    Row Name 02/13/23 1642       Muscle Loss    Loss of Muscle Mass Findings Mild    [place] Region --  mild    Clavicle Bone Region --  mild    Acromion Bone Region Moderate - acromion may slightly protrude    Scapular Bone Region --  mild    Dorsal Hand Region --  mild    Patellar Region Moderate - patella more prominent, less muscle definition around patella    Anterior Thigh Region --  mild    Posterior Calf Region Moderate - some roundness, slight firmness    Row Name 02/13/23 1642       Fat Loss    Subcutaneous Fat Loss Findings Mild    Orbital Region  --  mild    Upper Arm Region --  mild    Thoracic & Lumbar Region --  mild    Row Name 02/13/23 1642       Criteria Met (Must meet criteria for severity in at least 2 of these categories: M Wasting, Fat Loss, Fluid, Secondary Signs, Wt. Status, Intake)    Patient meets criteria for  Severe Malnutrition  Pt meets criteria for severe acute malnutrition based on wt intake hx w wasting.                 Electronically signed by:  Yaquelin Willams RD  02/13/23 16:47 EST

## 2023-02-13 NOTE — PLAN OF CARE
Problem: Adult Inpatient Plan of Care  Goal: Plan of Care Review  Outcome: Ongoing, Progressing  Flowsheets (Taken 2/13/2023 0622)  Outcome Evaluation: A&O x4, 2L O2 NC.  Middle upper abd pain.  Dilaudid given for pain relief as well as Norco.  NPO since midnight.  Goal: Patient-Specific Goal (Individualized)  Outcome: Ongoing, Progressing  Goal: Absence of Hospital-Acquired Illness or Injury  Outcome: Ongoing, Progressing  Intervention: Identify and Manage Fall Risk  Recent Flowsheet Documentation  Taken 2/13/2023 0600 by La Nichols, RN  Safety Promotion/Fall Prevention:   assistive device/personal items within reach   clutter free environment maintained   fall prevention program maintained   nonskid shoes/slippers when out of bed   room organization consistent   safety round/check completed  Taken 2/13/2023 0400 by La Nichols, RN  Safety Promotion/Fall Prevention:   assistive device/personal items within reach   clutter free environment maintained   fall prevention program maintained   nonskid shoes/slippers when out of bed   room organization consistent   safety round/check completed  Taken 2/13/2023 0200 by La Nichols, RN  Safety Promotion/Fall Prevention:   assistive device/personal items within reach   clutter free environment maintained   fall prevention program maintained   nonskid shoes/slippers when out of bed   room organization consistent   safety round/check completed  Taken 2/13/2023 0000 by La Nichols, RN  Safety Promotion/Fall Prevention:   assistive device/personal items within reach   clutter free environment maintained   fall prevention program maintained   nonskid shoes/slippers when out of bed   room organization consistent   safety round/check completed  Taken 2/12/2023 2200 by La Nichols, RN  Safety Promotion/Fall Prevention:   assistive device/personal items within reach   clutter free environment maintained   fall prevention program maintained   nonskid shoes/slippers when out of  bed   room organization consistent   safety round/check completed  Taken 2/12/2023 2000 by La Nichols RN  Safety Promotion/Fall Prevention:   assistive device/personal items within reach   clutter free environment maintained   fall prevention program maintained   nonskid shoes/slippers when out of bed   room organization consistent   safety round/check completed  Intervention: Prevent Skin Injury  Recent Flowsheet Documentation  Taken 2/13/2023 0600 by La Nichols RN  Body Position: position changed independently  Skin Protection:   adhesive use limited   incontinence pads utilized   protective footwear used   tubing/devices free from skin contact  Taken 2/13/2023 0400 by La Nichols RN  Body Position: position changed independently  Skin Protection:   adhesive use limited   protective footwear used   tubing/devices free from skin contact  Taken 2/13/2023 0200 by La Nichols RN  Body Position: position changed independently  Taken 2/13/2023 0000 by La Nichols RN  Body Position: position changed independently  Skin Protection:   adhesive use limited   protective footwear used   tubing/devices free from skin contact  Taken 2/12/2023 2200 by La Nichols RN  Body Position: position changed independently  Skin Protection:   adhesive use limited   incontinence pads utilized   protective footwear used   tubing/devices free from skin contact  Taken 2/12/2023 2000 by La Nichols RN  Body Position: position changed independently  Skin Protection:   adhesive use limited   incontinence pads utilized   protective footwear used   tubing/devices free from skin contact  Intervention: Prevent and Manage VTE (Venous Thromboembolism) Risk  Recent Flowsheet Documentation  Taken 2/13/2023 0600 by La Nichols RN  Activity Management:   activity adjusted per tolerance   activity encouraged  Taken 2/13/2023 0400 by La Nichols RN  Activity Management:   activity adjusted per tolerance   activity encouraged  Taken  2/13/2023 0200 by La Nichols, RN  Activity Management:   activity adjusted per tolerance   activity encouraged  Taken 2/13/2023 0000 by La Nichols RN  Activity Management:   activity adjusted per tolerance   activity encouraged  Taken 2/12/2023 2200 by La Nichols RN  Activity Management:   activity adjusted per tolerance   activity encouraged  Taken 2/12/2023 2000 by La Nichols, RN  Activity Management:   activity encouraged   activity adjusted per tolerance  VTE Prevention/Management:   bilateral   sequential compression devices off  Range of Motion: active ROM (range of motion) encouraged  Intervention: Prevent Infection  Recent Flowsheet Documentation  Taken 2/13/2023 0600 by La Nichols, LAURA  Infection Prevention:   environmental surveillance performed   hand hygiene promoted   personal protective equipment utilized   rest/sleep promoted   single patient room provided  Taken 2/13/2023 0400 by La Nichols RN  Infection Prevention:   environmental surveillance performed   hand hygiene promoted   personal protective equipment utilized   rest/sleep promoted   single patient room provided  Taken 2/13/2023 0200 by La Nichols RN  Infection Prevention:   environmental surveillance performed   hand hygiene promoted   personal protective equipment utilized   rest/sleep promoted   single patient room provided  Taken 2/13/2023 0000 by La Nichols RN  Infection Prevention:   environmental surveillance performed   hand hygiene promoted   personal protective equipment utilized   rest/sleep promoted   single patient room provided  Taken 2/12/2023 2200 by La Nichols RN  Infection Prevention:   environmental surveillance performed   hand hygiene promoted   personal protective equipment utilized   rest/sleep promoted   single patient room provided  Taken 2/12/2023 2000 by La Nichols, RN  Infection Prevention:   environmental surveillance performed   hand hygiene promoted   personal protective equipment  utilized   rest/sleep promoted   single patient room provided  Goal: Optimal Comfort and Wellbeing  Outcome: Ongoing, Progressing  Intervention: Monitor Pain and Promote Comfort  Recent Flowsheet Documentation  Taken 2/13/2023 0300 by La Nichols, RN  Pain Management Interventions: see MAR  Taken 2/13/2023 0100 by La Nichols, RN  Pain Management Interventions: see MAR  Taken 2/12/2023 2000 by La Nichols, RN  Pain Management Interventions: see MAR  Intervention: Provide Person-Centered Care  Recent Flowsheet Documentation  Taken 2/12/2023 2000 by La Nichols, RN  Trust Relationship/Rapport:   care explained   choices provided   emotional support provided   questions encouraged   empathic listening provided   questions answered   thoughts/feelings acknowledged  Goal: Readiness for Transition of Care  Outcome: Ongoing, Progressing   Goal Outcome Evaluation:              Outcome Evaluation: A&O x4, 2L O2 NC.  Middle upper abd pain.  Dilaudid given for pain relief as well as Norco.  NPO since midnight.

## 2023-02-14 ENCOUNTER — PREP FOR SURGERY (OUTPATIENT)
Dept: OTHER | Facility: HOSPITAL | Age: 78
End: 2023-02-14
Payer: MEDICARE

## 2023-02-14 ENCOUNTER — TELEPHONE (OUTPATIENT)
Dept: GASTROENTEROLOGY | Facility: CLINIC | Age: 78
End: 2023-02-14

## 2023-02-14 DIAGNOSIS — K25.9 GASTRIC ULCER, UNSPECIFIED CHRONICITY, UNSPECIFIED WHETHER GASTRIC ULCER HEMORRHAGE OR PERFORATION PRESENT: Primary | ICD-10-CM

## 2023-02-14 DIAGNOSIS — K80.50 BILIARY COLIC: ICD-10-CM

## 2023-02-14 NOTE — OUTREACH NOTE
Prep Survey    Flowsheet Row Responses   [facility] facility patient discharged from? [place]   Is LACE score < 7 ? No   Eligibility Readm Mgmt   Discharge diagnosis Choledocholithiasi, ERCP   Does the patient have one of the following disease processes/diagnoses(primary or secondary)? Other   Does the patient have Home health ordered? No   Is there a DME ordered? No   Prep survey completed? Yes          Maria MADRID - Registered Nurse

## 2023-02-14 NOTE — TELEPHONE ENCOUNTER
[facility] [address]  Phone: [phone]  Fax: [phone]         February 14, 2023 [address] [id number]  Phone: [phone]  Fax: [phone]                        Yamile Angel  [address] [place] [id number]  1945           Patient will be having an by Dr. Shaquille Dupont on April 11, 2023. The patient is needing cardiac clearance due to being on blood thinners. Please complete the following and fax back to the office.      Patient's was last seen in the office on:_____________________     Procedure/Test Performed:     _____ Stress Test       _____ Echocardiogram    _____ EKG     _____ Heart Cath     Based on the above test results and/or clinical evaluation it is my recommendation:     ____ Patient may proceed with the scheduled surgical procedure with an acceptable cardiac risk.     ____ Patient CAN NOT stop Plavix, Effient, Ticlid, Aspirin, Coumadin, Pradaxa, Xarelto, Eliquis, Savaysa, or Brilinta prior to procedure.      ____ Patient CAN stop Plavix, Effient, Ticlid, Aspirin, Coumadin, Pradaxa, Xarelto, Eliquis, Savaysa, or Brilinta  ______ days prior to procedure.     Please sign and date below.     Signature________________________________________   Date:_________________      Thank You        Shaquille Dupont M.D.

## 2023-02-14 NOTE — TELEPHONE ENCOUNTER
Please seek cardiac clearance for patient who is scheduled an EGD with Dr. Dupont on 04/11/23.    Patient has recent cardiac complications and takes eliquis.    I cannot be sure who her established cardiologist is, but she sees Jo Ann Lua often in cardiology.    Jo Ann Lua  Phone: [phone]  Fax: [phone]

## 2023-02-15 LAB
CYTO UR: NORMAL
LAB AP CASE REPORT: NORMAL
LAB AP CLINICAL INFORMATION: NORMAL
LAB AP SPECIAL STAINS: NORMAL
PATH REPORT.FINAL DX SPEC: NORMAL
PATH REPORT.GROSS SPEC: NORMAL

## 2023-02-16 ENCOUNTER — READMISSION MANAGEMENT (OUTPATIENT)
Dept: CALL CENTER | Facility: HOSPITAL | Age: 78
End: 2023-02-16
Payer: MEDICARE

## 2023-02-16 LAB
BACTERIA SPEC AEROBE CULT: NORMAL
BACTERIA SPEC AEROBE CULT: NORMAL

## 2023-02-16 NOTE — OUTREACH NOTE
Medical Week 1 Survey    Flowsheet Row Responses   [facility] patient discharged from? [place]   Does the patient have one of the following disease processes/diagnoses(primary or secondary)? Other   Week 1 attempt successful? No   Unsuccessful attempts Attempt 1          Laura MADRID - Licensed Nurse

## 2023-02-16 NOTE — PROGRESS NOTES
Enter Query Response Below      Query Response:       Yes       If applicable, please update the problem list.        Patient: Yamile Angel        : 1945  Account: [id number]           Admit Date: 2023        How to Respond to this query:       a. Click New Note     b. Answer query within the yellow box.                c. Update the Problem List, if applicable.      If you have any questions about this query contact me at: [email or web address]    Dr. Brunner:     Based on Medicare billing guidelines [facility] are not allowed to use diagnoses from pathology reports as the sole basis for billing. Any findings noted on a pathology report must be affirmed by the treating physician before billing.  The pathologist reported that the specimen shows Final Diagnosis -- -- --  [facility]  Result:   1.  DUODENUM:               Focal mucosal erosion with associated pyloric metaplasia and minimal active duodenitis, consistent with peptic duodenitis   Negative for neoplasia/malignancy  2.  GASTRIC ANTRUM  Reactive gastropathy with focal possible mucosal erosion  Negative for intestinal metaplasia, significant active inflammation, neoplasia, malignancy   H. pylori immunohistochemical stain negative for organisms  3.  STOMACH:               Mucosal erosion/ulceration with associated reactive changes and abundant ulcer slough               Foci of intestinal metaplasia with spotty active inflammation               Negative for neoplasia/malignancy               H. pylori immunohistochemical stain negative for organisms             Is this finding consistent with your clinical impression and treatment plan for this patient?  Yes  No  Other explanation for clinical impression and treatment plan_________  Clinically indeterminable    By submitting this query, we are merely seeking further clarification of documentation to accurately reflect all conditions that you are monitoring, evaluating, treating or that  extend the hospitalization or utilize additional resources of care. Please utilize your independent clinical judgment when addressing the question(s) above.     This query and your response, once completed, will be entered into the legal medical record.    Sincerely,  Cata Smith RN  Clinical Documentation Integrity Program

## 2023-02-17 ENCOUNTER — TELEPHONE (OUTPATIENT)
Dept: CARDIOLOGY | Facility: CLINIC | Age: 78
End: 2023-02-17
Payer: MEDICARE

## 2023-02-17 NOTE — TELEPHONE ENCOUNTER
Called pt to advise her she has been cleared for her EGD and to advise her of the instructions on the form. No answer [id number].

## 2023-02-20 ENCOUNTER — READMISSION MANAGEMENT (OUTPATIENT)
Dept: CALL CENTER | Facility: HOSPITAL | Age: 78
End: 2023-02-20
Payer: MEDICARE

## 2023-02-20 NOTE — OUTREACH NOTE
Medical Week 1 Survey    Flowsheet Row Responses   [facility] [place] patient discharged from? [place]   Does the patient have one of the following disease processes/diagnoses(primary or secondary)? Other   Week 1 attempt successful? Yes   Call start time 1343   Call end time 1346   Discharge diagnosis Choledocholithiasi, ERCP   Meds reviewed with patient/caregiver? Yes   Is the patient having any side effects they believe may be caused by any medication additions or changes? No   Does the patient have all medications ordered at discharge? Yes   Is the patient taking all medications as directed (includes completed medication regime)? Yes   Does the patient have a primary care provider?  Yes   Does the patient have an appointment with their PCP within 7 days of discharge? Yes   Has the patient kept scheduled appointments due by today? Yes   Has home health visited the patient within 72 hours of discharge? N/A   Psychosocial issues? No   Did the patient receive a copy of their discharge instructions? Yes   Nursing interventions Reviewed instructions with patient   What is the patient's perception of their health status since discharge? Improving   Is the patient/caregiver able to teach back signs and symptoms related to disease process for when to call PCP? Yes   Is the patient/caregiver able to teach back signs and symptoms related to disease process for when to call 911? Yes   Is the patient/caregiver able to teach back the hierarchy of who to call/visit for symptoms/problems? PCP, Specialist, Home health nurse, Urgent Care, ED, 911 Yes   Additional teach back comments states tolerating reg diet, states antibiotics making pt feel nauseated even when taken with food   Week 1 call completed? Yes          Jossie MADRID - Registered Nurse

## 2023-02-22 ENCOUNTER — OFFICE VISIT (OUTPATIENT)
Dept: CARDIOLOGY | Facility: CLINIC | Age: 78
End: 2023-02-22
Payer: MEDICARE

## 2023-02-22 VITALS
OXYGEN SATURATION: 93 % | BODY MASS INDEX: 21.5 KG/M2 | HEART RATE: 119 BPM | HEIGHT: 67 IN | DIASTOLIC BLOOD PRESSURE: 66 MMHG | WEIGHT: 137 LBS | SYSTOLIC BLOOD PRESSURE: 93 MMHG

## 2023-02-22 DIAGNOSIS — I48.0 PAROXYSMAL ATRIAL FIBRILLATION: Primary | ICD-10-CM

## 2023-02-22 DIAGNOSIS — I42.1 HOCM (HYPERTROPHIC OBSTRUCTIVE CARDIOMYOPATHY): ICD-10-CM

## 2023-02-22 PROCEDURE — 99213 OFFICE O/P EST LOW 20 MIN: CPT | Performed by: NURSE PRACTITIONER

## 2023-02-22 NOTE — PROGRESS NOTES
[facility]             [facility] TOMAS Lua Darryl John, MD  Yamile Angel  1945 02/22/2023    Patient Active Problem List   Diagnosis   • Preoperative clearance   • Encounter for screening for malignant neoplasm of colon   • Sepsis with acute renal failure without septic shock, due to unspecified organism, unspecified acute renal failure type (HCC)   • HOCM (hypertrophic obstructive cardiomyopathy) (HCC)   • Paroxysmal atrial fibrillation (HCC)   • Choledocholithiasis   • Leukocytosis   • HTN (hypertension)   • HLD (hyperlipidemia)   • Mesenteric ischemia, chronic (HCC)   • COPD (chronic obstructive pulmonary disease) (HCC)   • Tobacco abuse   • Gastric ulcer       Dear Cristofer Garcia MD:    Subjective     Chief Complaint   Patient presents with   • Follow-up     3 MONTH   • Chest Pain     Pt states when she takes certain medication her chest hurts. Pt states since stopping said medication she has no chest pain   • Palpitations   • Dizziness       HPI:     This is a 78 y.o. female with known past medical history of paroxysmal atrial fibrillation, essential hypertension, hyperlipidemia, chronic mesenteric ischemia, COPD and recent diagnosis of hypertrophic obstructive cardiomyopathy.    Yamile Angel presents today for routine cardiology follow up.  Patient states since her last visit she has been having some GI issues.  Of note patient has been on beta-blocker therapy for her hokum however she has been unable to tolerate multiple beta-blocker such as metoprolol tartrate, Toprol-XL and Coreg due to nausea and vomiting after taking these medications.  She was recently started on verapamil however also experienced the same symptoms.  She brings in a couple medications today and states she is unable to tolerate these which consist of Lipitor, potassium, Flagyl and verapamil.  She states every time she takes these medications she develops  nausea for about 3 to 4 hours and symptoms intermittent vomiting.  She also has associated abdominal pain and chest pain when she takes these medications.  She has not taken these medications in a few days due to her symptoms and symptoms have resolved.  She was recently transferred to [facility] from [facility] with generalized abdominal pain and was found to have choledocholithiasis.  She did undergo ERCP and sphincterectomy on 2/13/2023 with balloon sweeps yielding a single small black stone.  She also underwent EGD for which she was noted to have a 2 x 3 cm ulcer in the gastric fundus with diffuse moderate gastritis and scalloping and superficial linear ulcers in the duodenum.  Biopsy pending for H. pylori and celiac disease and it was recommended she take PPI twice daily with surveillance EGD to assess healing in 8 to 12 weeks with continued Ceftin and Flagyl outpatient.  She is scheduled to see GI in April 2023 for follow-up EGD.    • Diagnostic Testing  1. Echocardiogram 1/2023: EF 61 to 65% with mild septal asymmetric hypertrophy with left ventricular intracavity gradient noted to be 70 mmHg which may represent dynamic proximal ventricular obstruction  2. Carotid ultrasound 1/2023: Retrograde flow in the left vertebral artery with no evidence of hemodynamically significant plaques or stenosis      All other systems were reviewed and were negative.    Patient Active Problem List   Diagnosis   • Preoperative clearance   • Encounter for screening for malignant neoplasm of colon   • Sepsis with acute renal failure without septic shock, due to unspecified organism, unspecified acute renal failure type (HCC)   • HOCM (hypertrophic obstructive cardiomyopathy) ([facility])   • Paroxysmal atrial fibrillation (HCC)   • Choledocholithiasis   • Leukocytosis   • HTN (hypertension)   • HLD (hyperlipidemia)   • Mesenteric ischemia, chronic (HCC)   • COPD (chronic obstructive pulmonary disease) ([facility])   •  Tobacco abuse   • Gastric ulcer       family history includes Cancer in her father; Heart disease in her mother.     reports that she has been smoking cigarettes. She has a 60.00 pack-year smoking history. She has never used smokeless tobacco. She reports that she does not drink alcohol and does not use drugs.    Allergies   Allergen Reactions   • Penicillins Rash   • Sulfa Antibiotics Rash         Current Outpatient Medications:   •  albuterol sulfate  (90 Base) MCG/ACT inhaler, Inhale 2 puffs by mouth every 4 (Four) Hours As Needed for Wheezing., Disp: 18 g, Rfl: 0  •  apixaban (ELIQUIS) 5 MG tablet tablet, Take 1 tablet by mouth 2 (Two) Times a Day. Indications: home medication, Disp: 60 tablet, Rfl: 0  •  atorvastatin (LIPITOR) 40 MG tablet, Take 1 tablet by mouth Every Night., Disp: 30 tablet, Rfl: 0  •  cefuroxime (CEFTIN) 500 MG tablet, Take 1 tablet by mouth 2 (Two) Times a Day., Disp: 14 tablet, Rfl: 0  •  cyanocobalamin 1000 MCG/ML injection, Inject 1,000 mcg under the skin into the appropriate area as directed Every 30 (Thirty) Days., Disp: , Rfl:   •  ferrous sulfate 325 (65 FE) MG tablet, Take 325 mg by mouth 2 (Two) Times a Day., Disp: , Rfl:   •  gabapentin (NEURONTIN) 600 MG tablet, Take 600 mg by mouth 3 (Three) Times a Day., Disp: , Rfl:   •  ipratropium-albuterol (DUO-NEB) 0.5-2.5 mg/3 ml nebulizer, Take 3 mL by neb every 30 minutes as needed for shortness of air for up to 6 doses. Part of COPD Rescue Kit. (Only Start if in YELLOW ZONE.), Disp: 18 mL, Rfl: 0  •  metroNIDAZOLE (Flagyl) 500 MG tablet, Take 1 tablet by mouth 3 (Three) Times a Day., Disp: 21 tablet, Rfl: 0  •  ondansetron (ZOFRAN) 8 MG tablet, Take 8 mg by mouth 2 (Two) Times a Day As Needed for Nausea or Vomiting., Disp: , Rfl:   •  oxyCODONE-acetaminophen (PERCOCET)  MG per tablet, Take 1 tablet by mouth 4 (Four) Times a Day., Disp: , Rfl:   •  pantoprazole (Protonix) 40 MG EC tablet, Take 1 tablet by mouth 2 (Two)  Times a Day., Disp: 60 tablet, Rfl: 0  •  potassium chloride 10 MEQ CR tablet, Take 1 tablet by mouth 2 (Two) Times a Day for 15 days., Disp: 30 tablet, Rfl: 0  •  traZODone (DESYREL) 50 MG tablet, Take 1/2 tablet by mouth Every Night., Disp: 15 tablet, Rfl: 0  •  verapamil SR (CALAN-SR) 180 MG CR tablet, Take 1 tablet by mouth Daily., Disp: 60 tablet, Rfl: 3      Physical Exam:  I have reviewed the patient's current vital signs as documented in the patient's EMR.   Vitals:    02/22/23 1257   BP: 93/66   Pulse: 119   SpO2: 93%     Body mass index is 21.46 kg/m².       02/22/23  1257   Weight: 62.1 kg (137 lb)      Physical Exam  Constitutional:       General: She is not in acute distress.     Appearance: Normal appearance. She is well-developed and normal weight.   HENT:      Head: Normocephalic and atraumatic.   Eyes:      General: Lids are normal.      Conjunctiva/sclera: Conjunctivae normal.      Pupils: Pupils are equal, round, and reactive to light.   Neck:      Vascular: No carotid bruit or JVD.   Cardiovascular:      Rate and Rhythm: Normal rate and regular rhythm.      Pulses: Normal pulses.      Heart sounds: Normal heart sounds, S1 normal and S2 normal. No murmur heard.  Pulmonary:      Effort: Pulmonary effort is normal. No respiratory distress.      Breath sounds: Normal breath sounds. No wheezing.   Abdominal:      General: Bowel sounds are normal. There is no distension.      Palpations: Abdomen is soft. There is no hepatomegaly or splenomegaly.      Tenderness: There is no abdominal tenderness.   Musculoskeletal:         General: No swelling. Normal range of motion.      Cervical back: Normal range of motion and neck supple.      Right lower leg: No edema.      Left lower leg: No edema.   Skin:     General: Skin is warm and dry.      Coloration: Skin is not jaundiced.      Findings: No rash.   Neurological:      General: No focal deficit present.      Mental Status: She is alert and oriented to  person, place, and time. Mental status is at baseline.   Psychiatric:         Mood and Affect: Mood normal.         Speech: Speech normal.         Behavior: Behavior normal.         Thought Content: Thought content normal.         Judgment: Judgment normal.            DATA REVIEWED:     TTE/HECTOR:  Results for orders placed during the hospital encounter of 12/31/22    Adult Transthoracic Echo Complete W/ Cont if Necessary Per Protocol    Interpretation Summary  •  Left ventricular ejection fraction appears to be 61 - 65%.  •  Left ventricular wall thickness is consistent with mild septal asymmetric hypertrophy.  •  Left ventricular diastolic function is consistent with (grade I) impaired relaxation.  •  Left ventricular intracavitary gradient noted to be 70 mmHg.  This may represent dynamic  proximal ventricular obstruction.  Clinical correlation suggested.      Laboratory evaluations:    Lab Results   Component Value Date    GLUCOSE 97 02/13/2023    BUN 10 02/13/2023    CREATININE 0.48 (L) 02/13/2023    BCR 20.8 02/13/2023    K 4.1 02/13/2023    CO2 28.0 02/13/2023    CALCIUM 8.3 (L) 02/13/2023    ALBUMIN 3.3 (L) 02/13/2023    AST 21 02/13/2023    ALT 8 02/13/2023     Lab Results   Component Value Date    WBC 10.23 02/13/2023    HGB 9.8 (L) 02/13/2023    HCT 31.7 (L) 02/13/2023    .4 (H) 02/13/2023     02/13/2023     Lab Results   Component Value Date    CHOL 109 12/31/2022    TRIG 127 12/31/2022    HDL 37 (L) 12/31/2022    LDL 49 12/31/2022     Lab Results   Component Value Date    TSH 0.286 01/01/2023     No results found for: HGBA1C  Lab Results   Component Value Date    ALT 8 02/13/2023     No results found for: HGBA1C  Lab Results   Component Value Date    CREATININE 0.48 (L) 02/13/2023     Lab Results   Component Value Date    IRON 21 (L) 02/13/2023    TIBC 216 (L) 02/13/2023    FERRITIN 200.30 (H) 02/12/2023     Lab Results   Component Value Date    INR 1.15 (H) 02/13/2023    PROTIME 14.7 (H)  02/13/2023           --------------------------------------------------------------------------------------------------------------------------    ASSESSMENT/PLAN:      Diagnosis Plan   1. Paroxysmal atrial fibrillation (HCC)        2. HOCM (hypertrophic obstructive cardiomyopathy) ([facility])            1. Paroxysmal atrial fibrillation  • Currently normal sinus rhythm.  Patient is able to tolerate Eliquis with no issues therefore I have instructed her to continue this and stressed the importance.    2. HOCM  • Patient has been trialed on multiple beta-blockers including metoprolol tartrate, Toprol XL and Coreg but has been unable to tolerate them secondary to nausea and vomiting.  She was recently started on verapamil but also has not been able to tolerate this either and therefore she is currently not taking any beta-blocker or calcium channel blocker.  She was found to have ulcers and was treated with Flagyl and Ceftin with repeat EGD coming up.  I have advised patient to follow-up with GI regarding this issue.  At this time patient unable to tolerate any calcium channel blockers or beta-blockers.  She is currently symptomatic from cardiac standpoint therefore we will continue to monitor.      This document has been @Electronically signed by TOMAS Rizzo, 02/22/23, 12:55 PM EST.       Dictated Utilizing Dragon Dictation: Part of this note may be an electronic transcription/translation of spoken language to printed text using the Dragon Dictation System.    Follow-up appointment and medication changes provided in hand delivered After Visit Summary as well as reviewed in the room.

## 2023-02-28 ENCOUNTER — READMISSION MANAGEMENT (OUTPATIENT)
Dept: CALL CENTER | Facility: HOSPITAL | Age: 78
End: 2023-02-28
Payer: MEDICARE

## 2023-02-28 NOTE — OUTREACH NOTE
"Medical Week 2 Survey    Flowsheet Row Responses   [facility], [place], operated by [facility] patient discharged from? [place]   Does the patient have one of the following disease processes/diagnoses(primary or secondary)? Other   Week 2 attempt successful? Yes   Call start time 1622   Discharge diagnosis Choledocholithiasi, ERCP   Call end time 1626   Meds reviewed with patient/caregiver? Yes   Is the patient taking all medications as directed (includes completed medication regime)? Yes   Medication comments Pt states she was told by cardiology if some of the meds she is taking is making her really sick, she is to stop them   Does the patient have a primary care provider?  Yes   Has the patient kept scheduled appointments due by today? Yes  [Cards apt 2/22/23]   What is the patient's perception of their health status since discharge? Same   Is the patient/caregiver able to teach back signs and symptoms related to disease process for when to call PCP? Yes   Is the patient/caregiver able to teach back signs and symptoms related to disease process for when to call 911? Yes   Is the patient/caregiver able to teach back the hierarchy of who to call/visit for symptoms/problems? PCP, Specialist, Home health nurse, Urgent Care, ED, 911 Yes   If the patient is a current smoker, are they able to teach back resources for cessation? [id number]   Week 2 Call Completed? Yes   Wrap up additional comments Pt states she has stopped taking some of her meds d/t them \"making her sick\" -  pt states cardiology approved this-pt states at times she still feels sick -encouraged pt to seek medical attention if symptoms worsen           Jyoti H - Registered Nurse  "

## 2023-03-09 ENCOUNTER — READMISSION MANAGEMENT (OUTPATIENT)
Dept: CALL CENTER | Facility: HOSPITAL | Age: 78
End: 2023-03-09
Payer: MEDICARE

## 2023-03-09 NOTE — OUTREACH NOTE
"    2/13/2023         RE: Ingrid Amaral  [address] [address]        Dear Colleague,    Thank you for referring your patient, Ingrid Amaral, to the [facility]. Please see a copy of my visit note below.    Gastroenterology CLINIC VISIT, NEW PATIENT    CC/REFERRING PROVIDER: Soha Lino  REASON FOR CONSULTATION: diarrhea    HPI: 78 year old female with PMH of Lewy body dementia, asthma, HTN, and GERD, presenting to GI clinic with her  for evaluation of ongoing episodes of diarrhea. Patient is a poor historian; majority of history was obtained from her .  Patient's  stated that he gives the patient her medications. Said that there was a misconception regarding indications for Creon. The bottle label says \"for diarrhea\" so her has been giving Creon only when the patient complained of loose stools. Also, the patient discarded prescribed fiber supplement and has not been taking it for a while. He added that the patient has abdominal distention at times, after she misses OTC anti-gas pills (Gas-X or Beano). Admits to not giving the patient omeprazole as she has no complains of acid reflux for some time.  Said, \"if we were taking those medications correctly, we won't be here today\".    He reported that the patient has problems with gulping down her food and probably, swallows lots of air. She has lactose intolerance, but likes dairy creamers and ice-cream. They have Lactaid tablet available but sometimes, fail to take it. Request screening for gluten intolerance as many of the patient's family members suffer celiac disease. Admits to avoiding gluten. \"Our daughter moved to live with us and buys gluten free foods\".  Patient complains of occasional rectal pain from hemorrhoids. Denies seeing any blood. Reported family history of colon cancer in her father.    ROS: 10pt ROS performed and otherwise negative.    PAST MEDICAL HISTORY:  Past " Medical Week 3 Survey    Flowsheet Row Responses   [facility] patient discharged from? [facility]   Does the patient have one of the following disease processes/diagnoses(primary or secondary)? Other   Week 3 attempt successful? No   Unsuccessful attempts Attempt 1          Librado JULIEN - Registered Nurse   Medical History:   Diagnosis Date     Asthma      GERD (gastroesophageal reflux disease)      Radial head fracture 03/18/2010       PREVIOUS ABDOMINAL/GYNECOLOGIC SURGERIES:    Past Surgical History:   Procedure Laterality Date     ANKLE SURGERY      bilateral     COLONOSCOPY       COLONOSCOPY N/A 3/20/2015    Procedure: COLONOSCOPY;  Surgeon: Britney Martinez MD;  Location: [facility]     COLONOSCOPY N/A 5/22/2017    Procedure: COLONOSCOPY;  Colonoscopy;  Surgeon: Tristen Rangel MD;  Location: [facility]     ESOPHAGOSCOPY, GASTROSCOPY, DUODENOSCOPY (EGD), COMBINED N/A 6/7/2018    Procedure: COMBINED ESOPHAGOSCOPY, GASTROSCOPY, DUODENOSCOPY (EGD);  gastroscopy;  Surgeon: Tristen Rangel MD;  Location: [facility]     HC ESOPH/GAS REFLUX TEST W NASAL IMPED >1 HR  4/19/2012    Procedure:ESOPHAGEAL IMPEDENCE FUNCTION TEST WITH 24 HOUR PH GREATER THAN 1 HOUR; Surgeon:VALDO UMANZOR; Location: GI     HERNIA REPAIR      umbilcal     HYSTERECTOMY, PAP NO LONGER INDICATED  1991     TONSILLECTOMY       UPPER GI ENDOSCOPY           PERTINENT MEDICATIONS:  Current Outpatient Medications   Medication Sig Dispense Refill     alpha-d-galactosidase (BEANO) tablet Take 1 tablet by mouth daily as needed for other (bloating)       amLODIPine (NORVASC) 2.5 MG tablet Take 1 tablet (2.5 mg) by mouth daily FOR BLOOD PRESSURE 90 tablet 1     amylase-lipase-protease (CREON) 2251-99730-52506 units CPEP TAKE TWO CAPSULES BY MOUTH THREE TIMES A DAY WITH MEALS FOR DIARRHEA (Patient taking differently: Take 1 capsule by mouth 2 times daily (with meals) TAKE TWO CAPSULES BY MOUTH THREE TIMES A DAY WITH MEALS FOR DIARRHEA) 180 capsule 5     ascorbic acid 1000 MG TABS tablet Take 1,000 mg by mouth daily       Biotin w/ Vitamins C & E (HAIR/SKIN/NAILS PO) Take 1 capsule by mouth daily       cetirizine (ZYRTEC) 10 MG tablet TAKE ONE TABLET BY MOUTH ONCE DAILY IN THE MORNING FOR ALLERGIES 90 tablet 3     cyanocobalamin (VITAMIN B-12) 100 MCG tablet  TAKE TWO TABLETS BY MOUTH ONCE DAILY FOR B12 DEFICIENCY 60 tablet 11     donepezil (ARICEPT) 10 MG tablet TAKE ONE TABLET BY MOUTH EVERY NIGHT AT BEDTIME FOR MEMORY 90 tablet 3     DULoxetine (CYMBALTA) 30 MG capsule Take 1 capsule (30 mg) by mouth 2 times daily 60 capsule 11     gabapentin (NEURONTIN) 300 MG capsule Take 1 capsule (300 mg) by mouth 3 times daily (Patient taking differently: Take 300 mg by mouth 3 times daily Take 1 capsule by mouth twice daily (morning and evening). May take an additional 1 capsule if needed in the afternoon. Maximum of 3 capsules per day.) 180 capsule 5     lactase (LACTAID) 9000 units TABS tablet Take 1 tablet (9,000 Units) by mouth 3 times daily (with meals) For lactose intolerance, when eating milk-related foods. 90 tablet 11     polyethylene glycol (MIRALAX) 17 GM/Dose powder Take 17 g (1 capful) by mouth daily Start with 1/2 of the capful and take it in the morning for constipation, every day. Increase the dose to one capful a day if no bowel movement for 2 or more days. 507 g 1     psyllium (METAMUCIL/KONSYL) 58.6 % powder Take 6 g (1 teaspoonful) by mouth daily Take in the evening 283 g 3     traZODone (DESYREL) 100 MG tablet TAKE ONE TABLET BY MOUTH EVERY NIGHT AT BEDTIME FOR SLEEP 30 tablet 11     calcium carb-cholecalciferol (OS-LARS) 500-200 MG-UNIT tablet Take 1 tablet by mouth 2 times daily (with meals) FOR BONE HEALTH 180 tablet 3     omeprazole (PRILOSEC) 10 MG DR capsule TAKE 1 CAPSULE (10 MG) BY MOUTH DAILY FOR HEARTBURN 30 capsule 3     vitamin D3 (CHOLECALCIFEROL) 10 MCG (400 UNIT) capsule Take 1 capsule (400 Units) by mouth daily FOR BONE HEALTH 90 capsule 3     No other OTC/herbal/supplements reported by patient.    SOCIAL HISTORY:  Social History     Socioeconomic History     Marital status:      Spouse name: Not on file     Number of children: Not on file     Years of education: Not on file     Highest education level: Not on file   Occupational  History     Not on file   Tobacco Use     Smoking status: Former     Packs/day: 0.50     Years: 3.00     Pack years: 1.50     Types: Cigarettes     Smokeless tobacco: Never     Tobacco comments:     smoked for 3 years when she was around 20   Substance and Sexual Activity     Alcohol use: Yes     Comment: RARELY     Drug use: No     Sexual activity: Not Currently   Other Topics Concern     Parent/sibling w/ CABG, MI or angioplasty before 65F 55M? Yes     Comment: mother- valve problem   Social History Narrative     Not on file     Social Determinants of Health     Financial Resource Strain: Not on file   Food Insecurity: Not on file   Transportation Needs: Not on file   Physical Activity: Not on file   Stress: Not on file   Social Connections: Not on file   Intimate Partner Violence: Not on file   Housing Stability: Not on file       FAMILY HISTORY:  Denies colon/panc/esophageal/other GI CA, no other Parker or other HPS-related Barbara. No IBD/celiac, no other AI/liver/thyroid disease.    Family History   Problem Relation Age of Onset     Heart Disease Mother         valve problem     Hypertension Mother      Diabetes Mother         type 2     Cerebrovascular Disease Mother      Allergies Mother      Eye Disorder Mother         Macular degeneration     Osteoporosis Mother      Respiratory Mother      Migraines Mother      Cardiovascular Father         MI at age 80     Cancer - colorectal Father 87     Diabetes Father      Hypertension Father         type 2     Eye Disorder Father         macular degeneration     Lipids Father      C.A.D. Maternal Grandmother      Diabetes Paternal Grandmother         type 2     Cardiovascular Paternal Grandmother         MI     Diabetes Sister         type 2     Allergies Sister      Gastrointestinal Disease Sister      Depression Sister      Gastrointestinal Disease Daughter      Migraines Daughter      Gastrointestinal Disease Daughter      Migraines Daughter      Migraines Son       "Aneurysm No family hx of      Brain Hemorrhage No family hx of      Brain Tumor No family hx of      Cancer No family hx of      Chiari Malformation No family hx of      LUNG DISEASE No family hx of      Seizure Disorder No family hx of        PHYSICAL EXAMINATION:  Vitals reviewed  /76 (BP Location: Right arm, Patient Position: Sitting, Cuff Size: Adult Regular)   Ht 1.54 m (5' 0.63\")   Wt 46.7 kg (103 lb)   LMP  (LMP Unknown)   BMI 19.70 kg/m      General: Patient appears well in no acute distress. Orientated to self and place. Patient's  provided majority of history.  Skin: No visualized rash or lesions on visualized skin  Eyes: EOMI, no erythema, sclera icterus or discharge noted  Resp: breathing comfortably without accessory muscle usage, speaking in full sentences, no cough  Lung sounds clear  Card: Regular and rhythmic S1 and S2. No murmur, gallop, or rub  Abdomen: Active bowel sounds X 4 quadrants. Soft to palpation, no guarding or rebound tenderness   MSK: Appears to have normal range of motion based on visualized movements  Neurologic: No apparent tremors, facial movements symmetric      PERTINENT STUDIES Reviewed in EMR    ASSESSMENT/PLAN:  78 year old female  presented  to GI clinic with her . Initially, was referred for management of ongoing episodes of loose stools and bloating. They saw the MT provider, who addressed incorrect administration of Creon (was given as needed for diarrhea). Since the patient started receiving the medication twice a day, her symptoms had improved. Although, she still takes it incorrectly- one capsule twice a day instead of 2 capsules three times a day. Education provided. Patient's  stated, \"we don't take any pills at lunch\", and asked for twice a day dosing. Recommended to take 2 capsules twice a day.    Reviewed the patient's last EGD in 2018 and x-ray esophagram in 2019, suggestive for moderate to large size hiatal hernia. Normal " swallowing evaluation by speech therapist in 2018. We briefly talked about symptoms consistent with hiatal hernia and benefits of a PPI therapy. Patient's  is convinced that she does not need omeprazole at this time. We talked about indication for hiatal hernia surgery and I explained risks of the surgery. Conservative management was recommended.  Patient had normal colonoscopy in 2017. Family history significant for colon cancer in father. I explained that due to advanced age, we typically do not perform surveillance colonoscopy every 5 years, particularly if her last study was normal. May consider colonoscopy if develops hematochezia, or if her bloating/diarrhea/constipation persist despite taking the prescribed medications correctly.  Noted that for some time, the patient was on yearly MRCP surveillance schedule for enlarged pancreatic duct. It was discontinued in 2015, when she had normal MRCP.    We discussed bloating management. Suggested to use OTC anti-gas medications on a regular basis. Avoid dairy or take Lactaid before consuming dairy.   Ordered TTG. Advised the patient and her  to have at least 3 gram of gluten a day for 2 weeks before testing. Reportably, the patient has been consuming both- gluten containing and gluten free products.  Suspect that the patient suffers IBS, mixed type, or overflow diarrhea. Suggested to start Miralax and take it every morning. Resume fiber supplement in the evening.      ICD-10-CM    1. Chronic diarrhea  K52.9 Tissue transglutaminase robert IgA and IgG     CBC with platelets and differential     Vitamin B12     polyethylene glycol (MIRALAX) 17 GM/Dose powder     psyllium (METAMUCIL/KONSYL) 58.6 % powder      2. Lactose intolerance  E73.9 CBC with platelets and differential     polyethylene glycol (MIRALAX) 17 GM/Dose powder     psyllium (METAMUCIL/KONSYL) 58.6 % powder      3. Abdominal bloating  R14.0           Patient verbalized understanding and  appreciation of care provided. Stated that all of the questions were answered to her/his satisfaction.    Additional 17 minutes on the date of service was spent performing the following:   -Preparing to see the patient (eg, review of tests,providers progress notes, medical/surgical history,etc)   -Ordering medications, tests, or procedures   -Documenting clinical information in the electronic or other health record     RTC in 12 weeks.     Thank you for this consultation. It was a pleasure to participate in the care of this patient; please contact us with any further questions.    MARVIN Mauro, BRADYP-C  [facility]  Gastroenterology Department  [place], [place]    This note was created with Dragon voice recognition software, and while reviewed for accuracy, inadvertent minor typographic errors may occur. Please contact the provider if you have any questions.      Again, thank you for allowing me to participate in the care of your patient.        Sincerely,        MARVIN MAURO CNP

## 2023-03-14 ENCOUNTER — READMISSION MANAGEMENT (OUTPATIENT)
Dept: CALL CENTER | Facility: HOSPITAL | Age: 78
End: 2023-03-14
Payer: MEDICARE

## 2023-03-14 NOTE — OUTREACH NOTE
Medical Week 3 Survey    Flowsheet Row Responses   [facility] patient discharged from? [facility]   Does the patient have one of the following disease processes/diagnoses(primary or secondary)? Other   Week 3 attempt successful? No   Unsuccessful attempts Attempt 2   Discharge diagnosis Choledocholithiasi, ERCP          Savana FLORES - Registered Nurse

## 2023-04-10 ENCOUNTER — PRE-ADMISSION TESTING (OUTPATIENT)
Dept: PREADMISSION TESTING | Facility: HOSPITAL | Age: 78
End: 2023-04-10
Payer: MEDICARE

## 2023-04-10 VITALS — BODY MASS INDEX: 24.12 KG/M2 | WEIGHT: 131.06 LBS | HEIGHT: 62 IN

## 2023-04-10 LAB
DEPRECATED RDW RBC AUTO: 48.3 FL (ref 37–54)
ERYTHROCYTE [DISTWIDTH] IN BLOOD BY AUTOMATED COUNT: 13.4 % (ref 12.3–15.4)
HCT VFR BLD AUTO: 30.2 % (ref 34–46.6)
HGB BLD-MCNC: 9.1 G/DL (ref 12–15.9)
INR PPP: 1.08 (ref 0.84–1.13)
MCH RBC QN AUTO: 29.7 PG (ref 26.6–33)
MCHC RBC AUTO-ENTMCNC: 30.1 G/DL (ref 31.5–35.7)
MCV RBC AUTO: 98.7 FL (ref 79–97)
PLATELET # BLD AUTO: 229 10*3/MM3 (ref 140–450)
PMV BLD AUTO: 10.2 FL (ref 6–12)
POTASSIUM SERPL-SCNC: 3.2 MMOL/L (ref 3.5–5.2)
PROTHROMBIN TIME: 14 SECONDS (ref 11.4–14.4)
RBC # BLD AUTO: 3.06 10*6/MM3 (ref 3.77–5.28)
WBC NRBC COR # BLD: 6.73 10*3/MM3 (ref 3.4–10.8)

## 2023-04-10 PROCEDURE — 36415 COLL VENOUS BLD VENIPUNCTURE: CPT

## 2023-04-10 PROCEDURE — 84132 ASSAY OF SERUM POTASSIUM: CPT

## 2023-04-10 PROCEDURE — 85610 PROTHROMBIN TIME: CPT

## 2023-04-10 PROCEDURE — 85027 COMPLETE CBC AUTOMATED: CPT

## 2023-04-10 RX ORDER — NALOXEGOL OXALATE 25 MG/1
TABLET, FILM COATED ORAL
COMMUNITY
Start: 2023-03-20

## 2023-04-10 RX ORDER — POTASSIUM CHLORIDE 750 MG/1
10 TABLET, FILM COATED, EXTENDED RELEASE ORAL 2 TIMES DAILY
COMMUNITY

## 2023-04-10 NOTE — PAT
An arrival time for procedure was not provided during PAT visit. If patient had any questions or concerns about their arrival time, they were instructed to contact their surgeon/physician.  Additionally, if the patient referred to an arrival time that was acquired from their my chart account, patient was encouraged to verify that time with their surgeon/physician. Arrival times are NOT provided in Pre Admission Testing Department.    Patient denies any current skin issues.     EKG in [facility] from 1/26/23 and 2/11/23.

## 2023-04-11 ENCOUNTER — HOSPITAL ENCOUNTER (OUTPATIENT)
Facility: HOSPITAL | Age: 78
Setting detail: HOSPITAL OUTPATIENT SURGERY
Discharge: HOME | End: 2023-04-11
Attending: INTERNAL MEDICINE | Admitting: INTERNAL MEDICINE
Payer: MEDICARE

## 2023-04-11 ENCOUNTER — ANESTHESIA EVENT (OUTPATIENT)
Dept: GASTROENTEROLOGY | Facility: HOSPITAL | Age: 78
End: 2023-04-11
Payer: MEDICARE

## 2023-04-11 ENCOUNTER — ANESTHESIA (OUTPATIENT)
Dept: GASTROENTEROLOGY | Facility: HOSPITAL | Age: 78
End: 2023-04-11
Payer: MEDICARE

## 2023-04-11 VITALS
OXYGEN SATURATION: 99 % | HEART RATE: 98 BPM | SYSTOLIC BLOOD PRESSURE: 157 MMHG | TEMPERATURE: 97.4 F | RESPIRATION RATE: 16 BRPM | DIASTOLIC BLOOD PRESSURE: 79 MMHG

## 2023-04-11 DIAGNOSIS — K25.9 GASTRIC ULCER, UNSPECIFIED CHRONICITY, UNSPECIFIED WHETHER GASTRIC ULCER HEMORRHAGE OR PERFORATION PRESENT: ICD-10-CM

## 2023-04-11 PROCEDURE — 88313 SPECIAL STAINS GROUP 2: CPT | Performed by: INTERNAL MEDICINE

## 2023-04-11 PROCEDURE — 88342 IMHCHEM/IMCYTCHM 1ST ANTB: CPT | Performed by: INTERNAL MEDICINE

## 2023-04-11 PROCEDURE — 25010000002 SUCCINYLCHOLINE PER 20 MG

## 2023-04-11 PROCEDURE — 43239 EGD BIOPSY SINGLE/MULTIPLE: CPT | Performed by: INTERNAL MEDICINE

## 2023-04-11 PROCEDURE — S0260 H&P FOR SURGERY: HCPCS | Performed by: INTERNAL MEDICINE

## 2023-04-11 PROCEDURE — 25010000002 PROPOFOL 10 MG/ML EMULSION

## 2023-04-11 PROCEDURE — 88305 TISSUE EXAM BY PATHOLOGIST: CPT | Performed by: INTERNAL MEDICINE

## 2023-04-11 RX ORDER — IPRATROPIUM BROMIDE AND ALBUTEROL SULFATE 2.5; .5 MG/3ML; MG/3ML
3 SOLUTION RESPIRATORY (INHALATION) ONCE AS NEEDED
Status: DISCONTINUED | OUTPATIENT
Start: 2023-04-11 | End: 2023-04-11 | Stop reason: HOSPADM

## 2023-04-11 RX ORDER — SUCCINYLCHOLINE CHLORIDE 20 MG/ML
INJECTION INTRAMUSCULAR; INTRAVENOUS AS NEEDED
Status: DISCONTINUED | OUTPATIENT
Start: 2023-04-11 | End: 2023-04-11 | Stop reason: SURG

## 2023-04-11 RX ORDER — SODIUM CHLORIDE, SODIUM LACTATE, POTASSIUM CHLORIDE, AND CALCIUM CHLORIDE .6; .31; .03; .02 G/100ML; G/100ML; G/100ML; G/100ML
20 INJECTION, SOLUTION INTRAVENOUS CONTINUOUS
Status: DISCONTINUED | OUTPATIENT
Start: 2023-04-11 | End: 2023-04-11 | Stop reason: HOSPADM

## 2023-04-11 RX ORDER — BUPIVACAINE HCL/0.9 % NACL/PF 0.125 %
PLASTIC BAG, INJECTION (ML) EPIDURAL AS NEEDED
Status: DISCONTINUED | OUTPATIENT
Start: 2023-04-11 | End: 2023-04-11 | Stop reason: SURG

## 2023-04-11 RX ORDER — ONDANSETRON 2 MG/ML
4 INJECTION INTRAMUSCULAR; INTRAVENOUS ONCE AS NEEDED
Status: DISCONTINUED | OUTPATIENT
Start: 2023-04-11 | End: 2023-04-11 | Stop reason: HOSPADM

## 2023-04-11 RX ORDER — SODIUM CHLORIDE 0.9 % (FLUSH) 0.9 %
10 SYRINGE (ML) INJECTION AS NEEDED
Status: DISCONTINUED | OUTPATIENT
Start: 2023-04-11 | End: 2023-04-11 | Stop reason: HOSPADM

## 2023-04-11 RX ORDER — SODIUM CHLORIDE 0.9 % (FLUSH) 0.9 %
10 SYRINGE (ML) INJECTION EVERY 12 HOURS SCHEDULED
Status: DISCONTINUED | OUTPATIENT
Start: 2023-04-11 | End: 2023-04-11 | Stop reason: HOSPADM

## 2023-04-11 RX ORDER — ESMOLOL HYDROCHLORIDE 10 MG/ML
INJECTION INTRAVENOUS AS NEEDED
Status: DISCONTINUED | OUTPATIENT
Start: 2023-04-11 | End: 2023-04-11 | Stop reason: SURG

## 2023-04-11 RX ORDER — LIDOCAINE HYDROCHLORIDE 10 MG/ML
INJECTION, SOLUTION EPIDURAL; INFILTRATION; INTRACAUDAL; PERINEURAL AS NEEDED
Status: DISCONTINUED | OUTPATIENT
Start: 2023-04-11 | End: 2023-04-11 | Stop reason: SURG

## 2023-04-11 RX ORDER — PROPOFOL 10 MG/ML
VIAL (ML) INTRAVENOUS CONTINUOUS PRN
Status: DISCONTINUED | OUTPATIENT
Start: 2023-04-11 | End: 2023-04-11 | Stop reason: SURG

## 2023-04-11 RX ADMIN — Medication 100 MCG: at 13:56

## 2023-04-11 RX ADMIN — LIDOCAINE HYDROCHLORIDE 100 MG: 10 INJECTION, SOLUTION EPIDURAL; INFILTRATION; INTRACAUDAL; PERINEURAL at 13:56

## 2023-04-11 RX ADMIN — SODIUM CHLORIDE, POTASSIUM CHLORIDE, SODIUM LACTATE AND CALCIUM CHLORIDE 100 ML: 600; 310; 30; 20 INJECTION, SOLUTION INTRAVENOUS at 13:55

## 2023-04-11 RX ADMIN — ESMOLOL HYDROCHLORIDE 30 MG: 10 INJECTION, SOLUTION INTRAVENOUS at 14:09

## 2023-04-11 RX ADMIN — PROPOFOL 125 MCG/KG/MIN: 10 INJECTION, EMULSION INTRAVENOUS at 13:56

## 2023-04-11 RX ADMIN — ESMOLOL HYDROCHLORIDE 20 MG: 10 INJECTION, SOLUTION INTRAVENOUS at 13:56

## 2023-04-11 RX ADMIN — SODIUM CHLORIDE, POTASSIUM CHLORIDE, SODIUM LACTATE AND CALCIUM CHLORIDE 20 ML/HR: 600; 310; 30; 20 INJECTION, SOLUTION INTRAVENOUS at 13:36

## 2023-04-11 RX ADMIN — PROPOFOL 75 MG: 10 INJECTION, EMULSION INTRAVENOUS at 14:05

## 2023-04-11 RX ADMIN — SUCCINYLCHOLINE CHLORIDE 120 MG: 20 INJECTION, SOLUTION INTRAMUSCULAR; INTRAVENOUS at 14:05

## 2023-04-11 NOTE — ANESTHESIA PROCEDURE NOTES
Airway  Urgency: elective    Date/Time: 4/11/2023 2:05 PM  Airway not difficult    General Information and Staff    Patient location during procedure: OR  CRNA/CAA: Jameson Gerber CRNA    Indications and Patient Condition  Indications for airway management: airway protection    Preoxygenated: yes  MILS not maintained throughout  Mask difficulty assessment: 0 - not attempted    Final Airway Details  Final airway type: endotracheal airway      Successful airway: ETT  Cuffed: yes   Successful intubation technique: video laryngoscopy and RSI  Facilitating devices/methods: intubating stylet and cricoid pressure  Endotracheal tube insertion site: oral  Blade: Graham  Blade size: 3  ETT size (mm): 7.5  Cormack-Lehane Classification: grade I - full view of glottis (Indirect)  Placement verified by: chest auscultation and capnometry   Cuff volume (mL): 10  Measured from: lips  ETT/EBT  to lips (cm): 21  Number of attempts at approach: 1  Assessment: lips, teeth, and gum same as pre-op and atraumatic intubation    Additional Comments  Negative epigastric sounds, Breath sound equal bilaterally with symmetric chest rise and fall

## 2023-04-11 NOTE — ANESTHESIA PREPROCEDURE EVALUATION
Anesthesia Evaluation     Patient summary reviewed and Nursing notes reviewed   NPO Solid Status: > 8 hours  NPO Liquid Status: > 2 hours           Airway   Mallampati: III  TM distance: >3 FB  Neck ROM: full  Possible difficult intubation  Dental      Pulmonary    (+) a smoker, COPD (MDI ) moderate, sleep apnea,   (-) shortness of breath, recent URI  Cardiovascular     ECG reviewed    (+) hypertension, valvular problems/murmurs (HOCM), dysrhythmias Paroxysmal Atrial Fib, angina (rare ), hyperlipidemia,   (-) past MI, cardiac stents    ROS comment: HOCM   1/1/23- EF > 61% , LV intracavitary gradient 70mmhg  - poss dynamic  proximal ventricular obstruction, gr1dd, nl rvsf/sz, nl valves    Neuro/Psych  (-) seizures, CVA  GI/Hepatic/Renal/Endo    (+)  PUD,  renal disease,   (-) diabetes, no thyroid disorder    ROS Comment: Mesenteric ischemia     Musculoskeletal     Abdominal    Substance History      OB/GYN          Other   arthritis,      ROS/Med Hx Other: eliquis   HCT 30   K 3.2                   Anesthesia Plan    ASA 4     MAC     (PFL   Fluid bolus - avoid sympathetic stim -B blocker s/b  )  intravenous induction     Anesthetic plan, risks, benefits, and alternatives have been provided, discussed and informed consent has been obtained with: patient.    Plan discussed with CRNA.        CODE STATUS:

## 2023-04-11 NOTE — ANESTHESIA POSTPROCEDURE EVALUATION
Patient: Yamile Angel    Procedure Summary     Date: 04/11/23 Room / Location:  [facility]    Anesthesia Start: 1355 Anesthesia Stop: 1421    Procedure: ESOPHAGOGASTRODUODENOSCOPY Diagnosis:       Gastric ulcer, unspecified chronicity, unspecified whether gastric ulcer hemorrhage or perforation present      (Gastric ulcer, unspecified chronicity, unspecified whether gastric ulcer hemorrhage or perforation present [K25.9])    Surgeons: Shaquille Dupont MD Provider: Gt Aragon MD    Anesthesia Type: general, MAC ASA Status: 4          Anesthesia Type: general, MAC    Vitals  Vitals Value Taken Time   /97 04/11/23 1421   Temp 97.4 °F (36.3 °C) 04/11/23 1421   Pulse 84 04/11/23 1421   Resp 12 04/11/23 1421   SpO2 99 % 04/11/23 1421           Post Anesthesia Care and Evaluation    Patient location during evaluation: PACU  Patient participation: complete - patient participated  Level of consciousness: awake and alert  Pain score: 0  Pain management: adequate    Airway patency: patent  Anesthetic complications: No anesthetic complications  PONV Status: none  Cardiovascular status: hemodynamically stable and acceptable  Respiratory status: nonlabored ventilation, acceptable and nasal cannula  Hydration status: acceptable

## 2023-04-11 NOTE — H&P
HPI: Ms. Angel is a 77 yo with a history of atrial fibrillation, hypertension and hyperlipidemia. She presents for follow up of gastric ulcer. The patient has a good appetite. There is no weight loss. Ms. Angel has heartburn at times without difficult swallowing.She has abdominal discomfort at times with meals in the upper center without radiation.    PMH: PAF            Hypertension            Hyperlipidemia  PSH: Appendectomy           Hysterectomy            ERCP  All: PCN        Sulfa  Meds; see list  Fam hx: Sister with colon cancer  Soc hx;     ROS: see HPI    Phy Exam: Gen-pleasant female, no acute distress                     HEENT- oropharynx clear, no lesions                     Chest- clear to auscultation                     Cardiac- s1s2                     Abd- soft, bowel sounds present, no tenderness                     Ext- no cyanosis, no clubbing                     Neuro- awake, alert    Imp: Follow up of gastric ulcer    Plan: EGD to assess healing

## 2023-04-18 ENCOUNTER — TELEPHONE (OUTPATIENT)
Dept: GASTROENTEROLOGY | Facility: CLINIC | Age: 78
End: 2023-04-18
Payer: MEDICARE

## 2023-04-18 NOTE — TELEPHONE ENCOUNTER
----- Message from Shaquille Dupont MD sent at 4/18/2023  9:13 AM EDT -----  Let Ms. Angel know the biopsy of the stomach also did not show any dysplasia or H. pylori.  She will need to follow-up in the office in 2 months with Sheela Grajeda.  She needs to take the Protonix medication every day.  Also avoid NSAIDs.

## 2023-04-18 NOTE — TELEPHONE ENCOUNTER
I tried to call Ms Angel to give biopsy results. No answer; left voice message. I will mail results to patient.

## 2023-08-08 NOTE — PROGRESS NOTES
DATE:  8/9/2023    DIAGNOSIS: Weight loss, gastric ulcer    CHIEF COMPLAINT:  Weight loss, epigastric pain, nausea and vomiting    HPI:  Mr. Angel presents today for follow up. She was previously seen by Celio Wynn PA-C and was last seen in November 2021. At that time, she was seen for drug induced constipation and was started on Movantik. Also attempted colonoscopy in December 2021 but had inadequate colon prep. Repeat colonoscopy was recommended in 1 year but she has not had this done. Since February, she has been feeling poorly with complaints of epigastric pain, nausea and vomiting and weight loss. She weighed ~147 lbs in January 2023 per medical records and currently weighs 105 lbs. In February 2023, she was admitted to [facility] with epigastric pain. She underwent MRCP which showed choledocholithiasis and subsequently underwent ERCP and sphincterotomy on 2/13/23, balloon sweeps yielded a single small black stone. Also noted a 2 x 3 cm ulcer in the gastric fundus, and diffuse moderate gastritis, also scalloping and superficial linear ulcers in the duodenum. Pathology was benign and negative for H. Pylori and celiac disease. She was started on PPI therapy and had repeat EGD on 4/11/23 which showed a non-bleeding gastric ulcer with no stigmata of bleeding.Pathology was again benign. At present, she continues to struggle with epigastric pain particularly after eating. She says Protonix caused nausea and vomiting and therefore has discontinued. She is currently take Pepcid BID. She continues to have occasional nausea and vomiting. Denies burning in her throat or chest. She continues to lose weight as above. She complains of fatigue and weakness. Most recent CBC from April 2023 showed macrocytic anemia with Hg 9.1. She says she previously took oral iron which she discontinued as this caused worsening nausea. She denies having melena or BRBPR. She reports her sister had colon cancer. She reports her bowels  are moving well with Movantik and chronically takes Percocet. Denies NSAID use. She has no other complaints today.     PAST MEDICAL HISTORY:  Past Medical History:   Diagnosis Date    Anemia     Arthritis     Atrial fibrillation     Choledocholithiasis 02/11/2023    COPD (chronic obstructive pulmonary disease)     Elevated cholesterol     Heart murmur     History of claustrophobia     History of transfusion 2018    2 units PRBC at [facility] for anemia, pt denies transfusion reaction    HOCM (hypertrophic obstructive cardiomyopathy) 01/19/2023    Hyperlipidemia     Hypertension     Sepsis with acute renal failure without septic shock, due to unspecified organism, unspecified acute renal failure type 12/31/2022    Sleep apnea     NON-COMPLIANT with CPAP       PAST SURGICAL HISTORY:  Past Surgical History:   Procedure Laterality Date    APPENDECTOMY      BREAST BIOPSY Left     COLONOSCOPY      COLONOSCOPY N/A 12/15/2021    Procedure: COLONOSCOPY FOR SCREENING;  Surgeon: Rhonda Parada MD;  Location:  [facility];  Service: Gastroenterology;  Laterality: N/A;    ENDOSCOPY N/A 4/11/2023    Procedure: ESOPHAGOGASTRODUODENOSCOPY;  Surgeon: Shaquille Dupont MD;  Location:  [facility];  Service: Gastroenterology;  Laterality: N/A;    ERCP N/A 02/13/2023    Procedure: ENDOSCOPIC RETROGRADE CHOLANGIOPANCREATOGRAPHY;  Surgeon: Brunner, Mark I, MD;  Location:  [facility];  Service: Gastroenterology;  Laterality: N/A;    EYE SURGERY Bilateral     with lens implants    HYSTERECTOMY      SHOULDER SURGERY Right     Tendon Repair    TUBAL ABDOMINAL LIGATION      UPPER GASTROINTESTINAL ENDOSCOPY      WRIST SURGERY Right        SOCIAL HISTORY:  Social History     Socioeconomic History    Marital status:    Tobacco Use    Smoking status: Every Day     Packs/day: 1.00     Years: 65.00     Pack years: 65.00     Types: Cigarettes     Start date: 1958    Smokeless tobacco: Never   Vaping Use     "Vaping Use: Former   Substance and Sexual Activity    Alcohol use: Never    Drug use: Never    Sexual activity: Defer       FAMILY HISTORY:  Family History   Problem Relation Age of Onset    Heart disease Mother     Cancer Father      MEDICATIONS:  The current medication list was reviewed in the EMR    Current Outpatient Medications:     albuterol sulfate  (90 Base) MCG/ACT inhaler, Inhale 2 puffs by mouth every 4 (Four) Hours As Needed for Wheezing., Disp: 18 g, Rfl: 0    apixaban (ELIQUIS) 5 MG tablet tablet, Take 1 tablet by mouth 2 (Two) Times a Day. Indications: home medication (Patient taking differently: Take 1 tablet by mouth 2 (Two) Times a Day. Indications: Atrial Fibrillation, home medication), Disp: 60 tablet, Rfl: 0    atorvastatin (LIPITOR) 40 MG tablet, Take 1 tablet by mouth Every Night. (Patient taking differently: Take 1 tablet by mouth Every Night. 4/10/23: Patient states, \"the nurse (Jo Ann Lua) at my heart doctor's office told me I could stop taking any medicine that was making me sick to my stomach, so I stopped them all on 2/21/23\"), Disp: 30 tablet, Rfl: 0    cefuroxime (CEFTIN) 500 MG tablet, Take 1 tablet by mouth 2 (Two) Times a Day. (Patient taking differently: Take 1 tablet by mouth 2 (Two) Times a Day. 4/10/23: Patient states, \"the nurse (Jo Ann Lua) at my heart doctor's office told me I could stop taking any medicine that was making me sick to my stomach, so I stopped them all on 2/21/23\"), Disp: 14 tablet, Rfl: 0    cyanocobalamin 1000 MCG/ML injection, Inject 1 mL under the skin into the appropriate area as directed Every 30 (Thirty) Days., Disp: , Rfl:     ferrous sulfate 325 (65 FE) MG tablet, Take 1 tablet by mouth 2 (Two) Times a Day., Disp: , Rfl:     gabapentin (NEURONTIN) 600 MG tablet, Take 1 tablet by mouth 3 (Three) Times a Day., Disp: , Rfl:     ipratropium-albuterol (DUO-NEB) 0.5-2.5 mg/3 ml nebulizer, Take 3 mL by neb every 30 minutes as needed for " "shortness of air for up to 6 doses. Part of COPD Rescue Kit. (Only Start if in YELLOW ZONE.), Disp: 18 mL, Rfl: 0    metroNIDAZOLE (Flagyl) 500 MG tablet, Take 1 tablet by mouth 3 (Three) Times a Day. (Patient taking differently: Take 1 tablet by mouth 3 (Three) Times a Day. 4/10/23: Patient states, \"the nurse (Jo Ann Lua) at my heart doctor's office told me I could stop taking any medicine that was making me sick to my stomach, so I stopped them all on 2/21/23\"), Disp: 21 tablet, Rfl: 0    Movantik 25 MG tablet, Take 1 tablet every day by oral route as directed for 30 days., Disp: , Rfl:     ondansetron (ZOFRAN) 8 MG tablet, Take 1 tablet by mouth 2 (Two) Times a Day As Needed for Nausea or Vomiting., Disp: , Rfl:     oxyCODONE-acetaminophen (PERCOCET)  MG per tablet, Take 1 tablet by mouth 4 (Four) Times a Day., Disp: , Rfl:     pantoprazole (Protonix) 40 MG EC tablet, Take 1 tablet by mouth 2 (Two) Times a Day., Disp: 60 tablet, Rfl: 0    potassium chloride 10 MEQ CR tablet, Take 1 tablet by mouth 2 (Two) Times a Day. 4/10/23: Patient states, \"the nurse (Jo Ann Lua) at my heart doctor's office told me I could stop taking any medicine that was making me sick to my stomach, so I stopped them all on 2/21/23\", Disp: , Rfl:     traZODone (DESYREL) 50 MG tablet, Take 1/2 tablet by mouth Every Night., Disp: 15 tablet, Rfl: 0    verapamil SR (CALAN-SR) 180 MG CR tablet, Take 1 tablet by mouth Daily. (Patient taking differently: Take 1 tablet by mouth Daily. 4/10/23: Patient states, \"the nurse (Jo Ann Lua) at my heart doctor's office told me I could stop taking any medicine that was making me sick to my stomach, so I stopped them all on 2/21/23\"), Disp: 60 tablet, Rfl: 3    ALLERGIES:    Allergies   Allergen Reactions    Penicillins Rash    Sulfa Antibiotics Rash       REVIEW OF SYSTEMS:    A comprehensive 14 point review of systems was performed.  Significant findings as mentioned above.  All other " systems reviewed and are negative.      Physical Exam   Vital Signs:   Vitals:    23 1009   BP: 120/76   Pulse: 113    General: Elderly, alert and oriented x 3, in no acute distress.   Head: ATNC   Eyes: PERRL, No evidence of conjunctivitis.   Nose: No nasal discharge.   Mouth: Oral mucosal membranes moist. No oral ulceration or hemorrhages.   Neck: Neck supple. No thyromegaly. No JVD.   Lungs: Clear in all fields to A&P without rales, rhonchi or wheezing.   Heart: Regular tachycardia. No murmurs, rubs, or gallops.   Abdomen: Soft. Bowel sounds are normoactive. Nontender with palpation.   Extremities: No cyanosis or edema.   Neurologic: MS as above. Grossly non focal exam.    MAGIN23  Narrative & Impression   MRCP, NONCONTRAST, LIMITED, 2023     HISTORY:  78-year-old female presenting to the ED with abdominal pain, vomiting and sepsis. CT imaging showing bile duct dilatation. Total bilirubin level is normal, but alkaline phosphatase is elevated.     TECHNIQUE:  MRI examination of the upper abdomen was performed without gadolinium contrast administration. MRCP sequences were also performed.     FINDINGS:  Mild intrahepatic and moderate extrahepatic bile duct dilatation to the level of the ampulla. Upper CBD measures about 15 mm.     There is a solitary small round filling defect in the distal CBD above the ampulla measuring about 3 mm (series #7/image 50; series #3/image 15). Distal common bile duct stone is likely. No other bile duct filling defects are identified.     No pancreatic duct dilatation. Tiny amount of material layering within nondistended, mildly thick-walled gallbladder.     IMPRESSION:  1.  Suspected choledocholithiasis with a single small filling defect in distal common bile duct.  2.  Mild intrahepatic and moderate extrahepatic bile duct dilatation to the level of the ampulla. CBD 15 mm.  3.  Normal pancreatic duct.  4.  Material layering within the dependent portion of a  nondistended, mildly thick-walled gallbladder.     Signer Name: Kaz Peterson MD   Signed: 2023 4:31 PM   Workstation Name: [id number]    [facility]       RECENT LABS:  Lab Results   Component Value Date    WBC 6.73 04/10/2023    HGB 9.1 (L) 04/10/2023    HCT 30.2 (L) 04/10/2023    MCV 98.7 (H) 04/10/2023    RDW 13.4 04/10/2023     04/10/2023    NEUTRORELPCT 60.8 2023    LYMPHORELPCT 21.0 2023    MONORELPCT 6.0 2023    EOSRELPCT 11.5 (H) 2023    BASORELPCT 0.3 2023    NEUTROABS 8.65 (H) 2023    LYMPHSABS 2.98 2023       Lab Results   Component Value Date     2023    K 3.2 (L) 04/10/2023    CO2 28.0 2023     2023    BUN 10 2023    CREATININE 0.48 (L) 2023    GLUCOSE 97 2023    CALCIUM 8.3 (L) 2023    ALKPHOS 104 2023    AST 21 2023    ALT 8 2023    BILITOT 0.5 2023    ALBUMIN 3.3 (L) 2023    PROTEINTOT 5.9 (L) 2023    MG 2.0 2023       ASSESSMENT & PLAN:  Yamile Angel is a very pleasant 78 y.o. female with    1.  Weight loss:  2.  Family history of colon cancer (sister):  3. Drug induced constipation:  4. Nausea and vomitin. Gastric ulcer:  6. Choledocholithiasis s/p ERCP and sphincterotomy on 23  7. Anemia:    -Recommended EGD/Colonoscopy and after discussing the risks/benefits, patient was agreeable. Will obtain cardiac clearance given chronic anticoagulation for A-fib prior to scheduling.   -Will obtain repeat CBC, along with CMP, Iron panel, ferritin, B12 and folate today.   -She is not currently on PPI therapy. Will start omeprazole 40 mg PO twice daily. Will also start carafate four times daily.   -She will continue with Movantik for drug induced constipation which is working well.   -RTC following the above procedures.     Electronically Signed by: TOMAS Downing ,  2023 14:29 EDT        CC:   MD Jose Tavares Darryl John, MD

## 2023-08-08 NOTE — H&P (VIEW-ONLY)
DATE:  8/9/2023    DIAGNOSIS: Weight loss, gastric ulcer    CHIEF COMPLAINT:  Weight loss, epigastric pain, nausea and vomiting    HPI:  Mr. Angel presents today for follow up. She was previously seen by Celio Wynn PA-C and was last seen in November 2021. At that time, she was seen for drug induced constipation and was started on Movantik. Also attempted colonoscopy in December 2021 but had inadequate colon prep. Repeat colonoscopy was recommended in 1 year but she has not had this done. Since February, she has been feeling poorly with complaints of epigastric pain, nausea and vomiting and weight loss. She weighed ~147 lbs in January 2023 per medical records and currently weighs 105 lbs. In February 2023, she was admitted to [facility] with epigastric pain. She underwent MRCP which showed choledocholithiasis and subsequently underwent ERCP and sphincterotomy on 2/13/23, balloon sweeps yielded a single small black stone. Also noted a 2 x 3 cm ulcer in the gastric fundus, and diffuse moderate gastritis, also scalloping and superficial linear ulcers in the duodenum. Pathology was benign and negative for H. Pylori and celiac disease. She was started on PPI therapy and had repeat EGD on 4/11/23 which showed a non-bleeding gastric ulcer with no stigmata of bleeding.Pathology was again benign. At present, she continues to struggle with epigastric pain particularly after eating. She says Protonix caused nausea and vomiting and therefore has discontinued. She is currently take Pepcid BID. She continues to have occasional nausea and vomiting. Denies burning in her throat or chest. She continues to lose weight as above. She complains of fatigue and weakness. Most recent CBC from April 2023 showed macrocytic anemia with Hg 9.1. She says she previously took oral iron which she discontinued as this caused worsening nausea. She denies having melena or BRBPR. She reports her sister had colon cancer. She reports her bowels  are moving well with Movantik and chronically takes Percocet. Denies NSAID use. She has no other complaints today.     PAST MEDICAL HISTORY:  Past Medical History:   Diagnosis Date    Anemia     Arthritis     Atrial fibrillation     Choledocholithiasis 02/11/2023    COPD (chronic obstructive pulmonary disease)     Elevated cholesterol     Heart murmur     History of claustrophobia     History of transfusion 2018    2 units PRBC at [facility] for anemia, pt denies transfusion reaction    HOCM (hypertrophic obstructive cardiomyopathy) 01/19/2023    Hyperlipidemia     Hypertension     Sepsis with acute renal failure without septic shock, due to unspecified organism, unspecified acute renal failure type 12/31/2022    Sleep apnea     NON-COMPLIANT with CPAP       PAST SURGICAL HISTORY:  Past Surgical History:   Procedure Laterality Date    APPENDECTOMY      BREAST BIOPSY Left     COLONOSCOPY      COLONOSCOPY N/A 12/15/2021    Procedure: COLONOSCOPY FOR SCREENING;  Surgeon: Rhonda Parada MD;  Location:  [facility];  Service: Gastroenterology;  Laterality: N/A;    ENDOSCOPY N/A 4/11/2023    Procedure: ESOPHAGOGASTRODUODENOSCOPY;  Surgeon: Shaquille Dupont MD;  Location:  [facility];  Service: Gastroenterology;  Laterality: N/A;    ERCP N/A 02/13/2023    Procedure: ENDOSCOPIC RETROGRADE CHOLANGIOPANCREATOGRAPHY;  Surgeon: Brunner, Mark I, MD;  Location:  [facility];  Service: Gastroenterology;  Laterality: N/A;    EYE SURGERY Bilateral     with lens implants    HYSTERECTOMY      SHOULDER SURGERY Right     Tendon Repair    TUBAL ABDOMINAL LIGATION      UPPER GASTROINTESTINAL ENDOSCOPY      WRIST SURGERY Right        SOCIAL HISTORY:  Social History     Socioeconomic History    Marital status:    Tobacco Use    Smoking status: Every Day     Packs/day: 1.00     Years: 65.00     Pack years: 65.00     Types: Cigarettes     Start date: 1958    Smokeless tobacco: Never   Vaping Use     "Vaping Use: Former   Substance and Sexual Activity    Alcohol use: Never    Drug use: Never    Sexual activity: Defer       FAMILY HISTORY:  Family History   Problem Relation Age of Onset    Heart disease Mother     Cancer Father      MEDICATIONS:  The current medication list was reviewed in the EMR    Current Outpatient Medications:     albuterol sulfate  (90 Base) MCG/ACT inhaler, Inhale 2 puffs by mouth every 4 (Four) Hours As Needed for Wheezing., Disp: 18 g, Rfl: 0    apixaban (ELIQUIS) 5 MG tablet tablet, Take 1 tablet by mouth 2 (Two) Times a Day. Indications: home medication (Patient taking differently: Take 1 tablet by mouth 2 (Two) Times a Day. Indications: Atrial Fibrillation, home medication), Disp: 60 tablet, Rfl: 0    atorvastatin (LIPITOR) 40 MG tablet, Take 1 tablet by mouth Every Night. (Patient taking differently: Take 1 tablet by mouth Every Night. 4/10/23: Patient states, \"the nurse (Jo Ann Lua) at my heart doctor's office told me I could stop taking any medicine that was making me sick to my stomach, so I stopped them all on 2/21/23\"), Disp: 30 tablet, Rfl: 0    cefuroxime (CEFTIN) 500 MG tablet, Take 1 tablet by mouth 2 (Two) Times a Day. (Patient taking differently: Take 1 tablet by mouth 2 (Two) Times a Day. 4/10/23: Patient states, \"the nurse (Jo Ann Lua) at my heart doctor's office told me I could stop taking any medicine that was making me sick to my stomach, so I stopped them all on 2/21/23\"), Disp: 14 tablet, Rfl: 0    cyanocobalamin 1000 MCG/ML injection, Inject 1 mL under the skin into the appropriate area as directed Every 30 (Thirty) Days., Disp: , Rfl:     ferrous sulfate 325 (65 FE) MG tablet, Take 1 tablet by mouth 2 (Two) Times a Day., Disp: , Rfl:     gabapentin (NEURONTIN) 600 MG tablet, Take 1 tablet by mouth 3 (Three) Times a Day., Disp: , Rfl:     ipratropium-albuterol (DUO-NEB) 0.5-2.5 mg/3 ml nebulizer, Take 3 mL by neb every 30 minutes as needed for " "shortness of air for up to 6 doses. Part of COPD Rescue Kit. (Only Start if in YELLOW ZONE.), Disp: 18 mL, Rfl: 0    metroNIDAZOLE (Flagyl) 500 MG tablet, Take 1 tablet by mouth 3 (Three) Times a Day. (Patient taking differently: Take 1 tablet by mouth 3 (Three) Times a Day. 4/10/23: Patient states, \"the nurse (Jo Ann Lua) at my heart doctor's office told me I could stop taking any medicine that was making me sick to my stomach, so I stopped them all on 2/21/23\"), Disp: 21 tablet, Rfl: 0    Movantik 25 MG tablet, Take 1 tablet every day by oral route as directed for 30 days., Disp: , Rfl:     ondansetron (ZOFRAN) 8 MG tablet, Take 1 tablet by mouth 2 (Two) Times a Day As Needed for Nausea or Vomiting., Disp: , Rfl:     oxyCODONE-acetaminophen (PERCOCET)  MG per tablet, Take 1 tablet by mouth 4 (Four) Times a Day., Disp: , Rfl:     pantoprazole (Protonix) 40 MG EC tablet, Take 1 tablet by mouth 2 (Two) Times a Day., Disp: 60 tablet, Rfl: 0    potassium chloride 10 MEQ CR tablet, Take 1 tablet by mouth 2 (Two) Times a Day. 4/10/23: Patient states, \"the nurse (Jo Ann Lua) at my heart doctor's office told me I could stop taking any medicine that was making me sick to my stomach, so I stopped them all on 2/21/23\", Disp: , Rfl:     traZODone (DESYREL) 50 MG tablet, Take 1/2 tablet by mouth Every Night., Disp: 15 tablet, Rfl: 0    verapamil SR (CALAN-SR) 180 MG CR tablet, Take 1 tablet by mouth Daily. (Patient taking differently: Take 1 tablet by mouth Daily. 4/10/23: Patient states, \"the nurse (Jo Ann Lua) at my heart doctor's office told me I could stop taking any medicine that was making me sick to my stomach, so I stopped them all on 2/21/23\"), Disp: 60 tablet, Rfl: 3    ALLERGIES:    Allergies   Allergen Reactions    Penicillins Rash    Sulfa Antibiotics Rash       REVIEW OF SYSTEMS:    A comprehensive 14 point review of systems was performed.  Significant findings as mentioned above.  All other " systems reviewed and are negative.      Physical Exam   Vital Signs:   Vitals:    23 1009   BP: 120/76   Pulse: 113    General: Elderly, alert and oriented x 3, in no acute distress.   Head: ATNC   Eyes: PERRL, No evidence of conjunctivitis.   Nose: No nasal discharge.   Mouth: Oral mucosal membranes moist. No oral ulceration or hemorrhages.   Neck: Neck supple. No thyromegaly. No JVD.   Lungs: Clear in all fields to A&P without rales, rhonchi or wheezing.   Heart: Regular tachycardia. No murmurs, rubs, or gallops.   Abdomen: Soft. Bowel sounds are normoactive. Nontender with palpation.   Extremities: No cyanosis or edema.   Neurologic: MS as above. Grossly non focal exam.    MAGIN23  Narrative & Impression   MRCP, NONCONTRAST, LIMITED, 2023     HISTORY:  78-year-old female presenting to the ED with abdominal pain, vomiting and sepsis. CT imaging showing bile duct dilatation. Total bilirubin level is normal, but alkaline phosphatase is elevated.     TECHNIQUE:  MRI examination of the upper abdomen was performed without gadolinium contrast administration. MRCP sequences were also performed.     FINDINGS:  Mild intrahepatic and moderate extrahepatic bile duct dilatation to the level of the ampulla. Upper CBD measures about 15 mm.     There is a solitary small round filling defect in the distal CBD above the ampulla measuring about 3 mm (series #7/image 50; series #3/image 15). Distal common bile duct stone is likely. No other bile duct filling defects are identified.     No pancreatic duct dilatation. Tiny amount of material layering within nondistended, mildly thick-walled gallbladder.     IMPRESSION:  1.  Suspected choledocholithiasis with a single small filling defect in distal common bile duct.  2.  Mild intrahepatic and moderate extrahepatic bile duct dilatation to the level of the ampulla. CBD 15 mm.  3.  Normal pancreatic duct.  4.  Material layering within the dependent portion of a  nondistended, mildly thick-walled gallbladder.     Signer Name: Kaz Peterson MD   Signed: 2023 4:31 PM   Workstation Name: [id number]    [facility]       RECENT LABS:  Lab Results   Component Value Date    WBC 6.73 04/10/2023    HGB 9.1 (L) 04/10/2023    HCT 30.2 (L) 04/10/2023    MCV 98.7 (H) 04/10/2023    RDW 13.4 04/10/2023     04/10/2023    NEUTRORELPCT 60.8 2023    LYMPHORELPCT 21.0 2023    MONORELPCT 6.0 2023    EOSRELPCT 11.5 (H) 2023    BASORELPCT 0.3 2023    NEUTROABS 8.65 (H) 2023    LYMPHSABS 2.98 2023       Lab Results   Component Value Date     2023    K 3.2 (L) 04/10/2023    CO2 28.0 2023     2023    BUN 10 2023    CREATININE 0.48 (L) 2023    GLUCOSE 97 2023    CALCIUM 8.3 (L) 2023    ALKPHOS 104 2023    AST 21 2023    ALT 8 2023    BILITOT 0.5 2023    ALBUMIN 3.3 (L) 2023    PROTEINTOT 5.9 (L) 2023    MG 2.0 2023       ASSESSMENT & PLAN:  Yamile Angel is a very pleasant 78 y.o. female with    1.  Weight loss:  2.  Family history of colon cancer (sister):  3. Drug induced constipation:  4. Nausea and vomitin. Gastric ulcer:  6. Choledocholithiasis s/p ERCP and sphincterotomy on 23  7. Anemia:    -Recommended EGD/Colonoscopy and after discussing the risks/benefits, patient was agreeable. Will obtain cardiac clearance given chronic anticoagulation for A-fib prior to scheduling.   -Will obtain repeat CBC, along with CMP, Iron panel, ferritin, B12 and folate today.   -She is not currently on PPI therapy. Will start omeprazole 40 mg PO twice daily. Will also start carafate four times daily.   -She will continue with Movantik for drug induced constipation which is working well.   -RTC following the above procedures.     Electronically Signed by: TOMAS Downing ,  2023 14:29 EDT        CC:   MD Jose Tavares Darryl John, MD

## 2023-08-09 ENCOUNTER — TELEPHONE (OUTPATIENT)
Dept: CARDIOLOGY | Facility: CLINIC | Age: 78
End: 2023-08-09
Payer: MEDICARE

## 2023-08-09 ENCOUNTER — OFFICE VISIT (OUTPATIENT)
Dept: GASTROENTEROLOGY | Facility: CLINIC | Age: 78
End: 2023-08-09
Payer: MEDICARE

## 2023-08-09 VITALS
DIASTOLIC BLOOD PRESSURE: 76 MMHG | BODY MASS INDEX: 19.32 KG/M2 | WEIGHT: 105 LBS | HEART RATE: 113 BPM | HEIGHT: 62 IN | SYSTOLIC BLOOD PRESSURE: 120 MMHG

## 2023-08-09 DIAGNOSIS — Z80.0 FAMILY HISTORY OF COLON CANCER: ICD-10-CM

## 2023-08-09 DIAGNOSIS — R63.4 WEIGHT LOSS: ICD-10-CM

## 2023-08-09 DIAGNOSIS — Z12.11 ENCOUNTER FOR SCREENING FOR MALIGNANT NEOPLASM OF COLON: ICD-10-CM

## 2023-08-09 DIAGNOSIS — K80.50 CHOLEDOCHOLITHIASIS: ICD-10-CM

## 2023-08-09 DIAGNOSIS — R10.13 EPIGASTRIC PAIN: ICD-10-CM

## 2023-08-09 DIAGNOSIS — K21.9 GASTROESOPHAGEAL REFLUX DISEASE, UNSPECIFIED WHETHER ESOPHAGITIS PRESENT: Primary | ICD-10-CM

## 2023-08-09 DIAGNOSIS — K59.03 DRUG INDUCED CONSTIPATION: ICD-10-CM

## 2023-08-09 DIAGNOSIS — K25.7 CHRONIC GASTRIC ULCER, UNSPECIFIED WHETHER GASTRIC ULCER HEMORRHAGE OR PERFORATION PRESENT: ICD-10-CM

## 2023-08-09 DIAGNOSIS — D64.9 ANEMIA, UNSPECIFIED TYPE: ICD-10-CM

## 2023-08-09 PROCEDURE — 82728 ASSAY OF FERRITIN: CPT | Performed by: NURSE PRACTITIONER

## 2023-08-09 PROCEDURE — 80053 COMPREHEN METABOLIC PANEL: CPT | Performed by: NURSE PRACTITIONER

## 2023-08-09 PROCEDURE — 1159F MED LIST DOCD IN RCRD: CPT | Performed by: NURSE PRACTITIONER

## 2023-08-09 PROCEDURE — 84466 ASSAY OF TRANSFERRIN: CPT | Performed by: NURSE PRACTITIONER

## 2023-08-09 PROCEDURE — 82607 VITAMIN B-12: CPT | Performed by: NURSE PRACTITIONER

## 2023-08-09 PROCEDURE — 3074F SYST BP LT 130 MM HG: CPT | Performed by: NURSE PRACTITIONER

## 2023-08-09 PROCEDURE — 99214 OFFICE O/P EST MOD 30 MIN: CPT | Performed by: NURSE PRACTITIONER

## 2023-08-09 PROCEDURE — 82746 ASSAY OF FOLIC ACID SERUM: CPT | Performed by: NURSE PRACTITIONER

## 2023-08-09 PROCEDURE — 85025 COMPLETE CBC W/AUTO DIFF WBC: CPT | Performed by: NURSE PRACTITIONER

## 2023-08-09 PROCEDURE — 83540 ASSAY OF IRON: CPT | Performed by: NURSE PRACTITIONER

## 2023-08-09 PROCEDURE — 36415 COLL VENOUS BLD VENIPUNCTURE: CPT | Performed by: NURSE PRACTITIONER

## 2023-08-09 PROCEDURE — 3078F DIAST BP <80 MM HG: CPT | Performed by: NURSE PRACTITIONER

## 2023-08-09 PROCEDURE — 1160F RVW MEDS BY RX/DR IN RCRD: CPT | Performed by: NURSE PRACTITIONER

## 2023-08-09 RX ORDER — OMEPRAZOLE 40 MG/1
40 CAPSULE, DELAYED RELEASE ORAL
Qty: 60 CAPSULE | Refills: 3 | Status: SHIPPED | OUTPATIENT
Start: 2023-08-09

## 2023-08-09 RX ORDER — SUCRALFATE ORAL 1 G/10ML
1 SUSPENSION ORAL 4 TIMES DAILY
Qty: 414 ML | Refills: 2 | Status: SHIPPED | OUTPATIENT
Start: 2023-08-09

## 2023-08-09 RX ORDER — NALOXEGOL OXALATE 25 MG/1
25 TABLET, FILM COATED ORAL EVERY MORNING
Qty: 30 TABLET | Refills: 11 | Status: SHIPPED | OUTPATIENT
Start: 2023-08-09

## 2023-08-09 RX ORDER — POLYETHYLENE GLYCOL 3350 17 G/17G
510 POWDER, FOR SOLUTION ORAL TAKE AS DIRECTED
Qty: 510 G | Refills: 0 | Status: SHIPPED | OUTPATIENT
Start: 2023-08-09

## 2023-08-09 RX ORDER — BISACODYL 5 MG/1
20 TABLET, DELAYED RELEASE ORAL ONCE
Qty: 4 TABLET | Refills: 0 | Status: SHIPPED | OUTPATIENT
Start: 2023-08-09 | End: 2023-08-09

## 2023-08-09 NOTE — TELEPHONE ENCOUNTER
----- Message from Rhonda Haines May sent at 8/9/2023 12:25 PM EDT -----  Patient is scheduled to have a EGD/Colonoscopy on 8-15-23 and we need to obtain a cardiac clearance please for patient to hold her Elquis 3 days prior.

## 2023-08-10 DIAGNOSIS — D50.9 IRON DEFICIENCY ANEMIA, UNSPECIFIED IRON DEFICIENCY ANEMIA TYPE: Primary | ICD-10-CM

## 2023-08-10 DIAGNOSIS — E53.8 FOLATE DEFICIENCY: Primary | ICD-10-CM

## 2023-08-10 LAB
ALBUMIN SERPL-MCNC: 3.8 G/DL (ref 3.5–5.2)
ALBUMIN/GLOB SERPL: 1.3 G/DL
ALP SERPL-CCNC: 92 U/L (ref 39–117)
ALT SERPL W P-5'-P-CCNC: 8 U/L (ref 1–33)
ANION GAP SERPL CALCULATED.3IONS-SCNC: 10 MMOL/L (ref 5–15)
AST SERPL-CCNC: 12 U/L (ref 1–32)
BASOPHILS # BLD AUTO: 0.04 10*3/MM3 (ref 0–0.2)
BASOPHILS NFR BLD AUTO: 0.3 % (ref 0–1.5)
BILIRUB SERPL-MCNC: 0.4 MG/DL (ref 0–1.2)
BUN SERPL-MCNC: 16 MG/DL (ref 8–23)
BUN/CREAT SERPL: 26.7 (ref 7–25)
CALCIUM SPEC-SCNC: 8.8 MG/DL (ref 8.6–10.5)
CHLORIDE SERPL-SCNC: 103 MMOL/L (ref 98–107)
CO2 SERPL-SCNC: 27 MMOL/L (ref 22–29)
CREAT SERPL-MCNC: 0.6 MG/DL (ref 0.57–1)
DEPRECATED RDW RBC AUTO: 49.3 FL (ref 37–54)
EGFRCR SERPLBLD CKD-EPI 2021: 92 ML/MIN/1.73
EOSINOPHIL # BLD AUTO: 0.32 10*3/MM3 (ref 0–0.4)
EOSINOPHIL NFR BLD AUTO: 2.2 % (ref 0.3–6.2)
ERYTHROCYTE [DISTWIDTH] IN BLOOD BY AUTOMATED COUNT: 15 % (ref 12.3–15.4)
FERRITIN SERPL-MCNC: 27 NG/ML (ref 13–150)
FOLATE SERPL-MCNC: 4.84 NG/ML (ref 4.78–24.2)
GLOBULIN UR ELPH-MCNC: 3 GM/DL
GLUCOSE SERPL-MCNC: 109 MG/DL (ref 65–99)
HCT VFR BLD AUTO: 24.7 % (ref 34–46.6)
HGB BLD-MCNC: 8.1 G/DL (ref 12–15.9)
IMM GRANULOCYTES # BLD AUTO: 0.09 10*3/MM3 (ref 0–0.05)
IMM GRANULOCYTES NFR BLD AUTO: 0.6 % (ref 0–0.5)
IRON 24H UR-MRATE: 20 MCG/DL (ref 37–145)
IRON SATN MFR SERPL: 6 % (ref 20–50)
LYMPHOCYTES # BLD AUTO: 2.37 10*3/MM3 (ref 0.7–3.1)
LYMPHOCYTES NFR BLD AUTO: 16 % (ref 19.6–45.3)
MCH RBC QN AUTO: 29.7 PG (ref 26.6–33)
MCHC RBC AUTO-ENTMCNC: 32.8 G/DL (ref 31.5–35.7)
MCV RBC AUTO: 90.5 FL (ref 79–97)
MONOCYTES # BLD AUTO: 0.83 10*3/MM3 (ref 0.1–0.9)
MONOCYTES NFR BLD AUTO: 5.6 % (ref 5–12)
NEUTROPHILS NFR BLD AUTO: 11.19 10*3/MM3 (ref 1.7–7)
NEUTROPHILS NFR BLD AUTO: 75.3 % (ref 42.7–76)
NRBC BLD AUTO-RTO: 0 /100 WBC (ref 0–0.2)
PLATELET # BLD AUTO: 330 10*3/MM3 (ref 140–450)
PMV BLD AUTO: 10.8 FL (ref 6–12)
POTASSIUM SERPL-SCNC: 3.9 MMOL/L (ref 3.5–5.2)
PROT SERPL-MCNC: 6.8 G/DL (ref 6–8.5)
RBC # BLD AUTO: 2.73 10*6/MM3 (ref 3.77–5.28)
SODIUM SERPL-SCNC: 140 MMOL/L (ref 136–145)
TIBC SERPL-MCNC: 317 MCG/DL (ref 298–536)
TRANSFERRIN SERPL-MCNC: 213 MG/DL (ref 200–360)
VIT B12 BLD-MCNC: 485 PG/ML (ref 211–946)
WBC NRBC COR # BLD: 14.84 10*3/MM3 (ref 3.4–10.8)

## 2023-08-10 RX ORDER — FOLIC ACID 1 MG/1
1 TABLET ORAL DAILY
Qty: 30 TABLET | Refills: 11 | Status: SHIPPED | OUTPATIENT
Start: 2023-08-10

## 2023-08-10 NOTE — PROGRESS NOTES
CBC showed Hg 8.1 and iron stores remain low. She previously took oral iron and could not tolerate. Therefore, will replace with IV iron. She will have EGD/colonoscopy as scheduled. Also checked B12 which was replete. Folate was marginally low and also likely contributing to anemia therefore will start on folic acid 1 mg PO daily. Discussed the above with patient.

## 2023-08-11 PROBLEM — D64.9 ANEMIA: Status: ACTIVE | Noted: 2023-08-11

## 2023-08-11 PROBLEM — Z80.0 FAMILY HISTORY OF COLON CANCER: Status: ACTIVE | Noted: 2023-08-11

## 2023-08-11 PROBLEM — R63.4 WEIGHT LOSS: Status: ACTIVE | Noted: 2023-08-11

## 2023-08-11 PROBLEM — K21.9 GASTROESOPHAGEAL REFLUX DISEASE: Status: ACTIVE | Noted: 2023-08-11

## 2023-08-11 PROBLEM — R10.13 EPIGASTRIC PAIN: Status: ACTIVE | Noted: 2023-08-11

## 2023-08-15 ENCOUNTER — ANESTHESIA (OUTPATIENT)
Dept: PERIOP | Facility: HOSPITAL | Age: 78
End: 2023-08-15
Payer: MEDICARE

## 2023-08-15 ENCOUNTER — ANESTHESIA EVENT (OUTPATIENT)
Dept: PERIOP | Facility: HOSPITAL | Age: 78
End: 2023-08-15
Payer: MEDICARE

## 2023-08-15 ENCOUNTER — HOSPITAL ENCOUNTER (OUTPATIENT)
Facility: HOSPITAL | Age: 78
Setting detail: HOSPITAL OUTPATIENT SURGERY
Discharge: HOME | End: 2023-08-15
Attending: INTERNAL MEDICINE | Admitting: INTERNAL MEDICINE
Payer: MEDICARE

## 2023-08-15 VITALS
BODY MASS INDEX: 19.32 KG/M2 | SYSTOLIC BLOOD PRESSURE: 147 MMHG | HEIGHT: 62 IN | RESPIRATION RATE: 20 BRPM | WEIGHT: 105 LBS | OXYGEN SATURATION: 100 % | TEMPERATURE: 97.5 F | HEART RATE: 78 BPM | DIASTOLIC BLOOD PRESSURE: 76 MMHG

## 2023-08-15 DIAGNOSIS — D64.9 ANEMIA, UNSPECIFIED TYPE: ICD-10-CM

## 2023-08-15 DIAGNOSIS — Z80.0 FAMILY HISTORY OF COLON CANCER: ICD-10-CM

## 2023-08-15 DIAGNOSIS — Z12.11 ENCOUNTER FOR SCREENING FOR MALIGNANT NEOPLASM OF COLON: ICD-10-CM

## 2023-08-15 DIAGNOSIS — K25.7 CHRONIC GASTRIC ULCER, UNSPECIFIED WHETHER GASTRIC ULCER HEMORRHAGE OR PERFORATION PRESENT: ICD-10-CM

## 2023-08-15 DIAGNOSIS — R63.4 WEIGHT LOSS: ICD-10-CM

## 2023-08-15 DIAGNOSIS — K21.9 GASTROESOPHAGEAL REFLUX DISEASE, UNSPECIFIED WHETHER ESOPHAGITIS PRESENT: ICD-10-CM

## 2023-08-15 DIAGNOSIS — R10.13 EPIGASTRIC PAIN: ICD-10-CM

## 2023-08-15 PROCEDURE — 88300 SURGICAL PATH GROSS: CPT

## 2023-08-15 PROCEDURE — 45380 COLONOSCOPY AND BIOPSY: CPT | Performed by: INTERNAL MEDICINE

## 2023-08-15 PROCEDURE — 43239 EGD BIOPSY SINGLE/MULTIPLE: CPT | Performed by: INTERNAL MEDICINE

## 2023-08-15 PROCEDURE — 25010000002 PROPOFOL 200 MG/20ML EMULSION: Performed by: NURSE ANESTHETIST, CERTIFIED REGISTERED

## 2023-08-15 PROCEDURE — 88305 TISSUE EXAM BY PATHOLOGIST: CPT

## 2023-08-15 RX ORDER — METOPROLOL SUCCINATE 50 MG/1
50 TABLET, EXTENDED RELEASE ORAL DAILY
COMMUNITY

## 2023-08-15 RX ORDER — SODIUM CHLORIDE 0.9 % (FLUSH) 0.9 %
10 SYRINGE (ML) INJECTION AS NEEDED
Status: DISCONTINUED | OUTPATIENT
Start: 2023-08-15 | End: 2023-08-15 | Stop reason: HOSPADM

## 2023-08-15 RX ORDER — FAMOTIDINE 40 MG/1
40 TABLET, FILM COATED ORAL DAILY
COMMUNITY

## 2023-08-15 RX ORDER — IPRATROPIUM BROMIDE AND ALBUTEROL SULFATE 2.5; .5 MG/3ML; MG/3ML
3 SOLUTION RESPIRATORY (INHALATION) ONCE AS NEEDED
Status: DISCONTINUED | OUTPATIENT
Start: 2023-08-15 | End: 2023-08-15 | Stop reason: HOSPADM

## 2023-08-15 RX ORDER — SODIUM CHLORIDE 0.9 % (FLUSH) 0.9 %
10 SYRINGE (ML) INJECTION EVERY 12 HOURS SCHEDULED
Status: DISCONTINUED | OUTPATIENT
Start: 2023-08-15 | End: 2023-08-15 | Stop reason: HOSPADM

## 2023-08-15 RX ORDER — SODIUM CHLORIDE 9 MG/ML
40 INJECTION, SOLUTION INTRAVENOUS AS NEEDED
Status: DISCONTINUED | OUTPATIENT
Start: 2023-08-15 | End: 2023-08-15 | Stop reason: HOSPADM

## 2023-08-15 RX ORDER — ONDANSETRON 2 MG/ML
4 INJECTION INTRAMUSCULAR; INTRAVENOUS AS NEEDED
Status: DISCONTINUED | OUTPATIENT
Start: 2023-08-15 | End: 2023-08-15 | Stop reason: HOSPADM

## 2023-08-15 RX ORDER — SODIUM CHLORIDE, SODIUM LACTATE, POTASSIUM CHLORIDE, CALCIUM CHLORIDE 600; 310; 30; 20 MG/100ML; MG/100ML; MG/100ML; MG/100ML
125 INJECTION, SOLUTION INTRAVENOUS ONCE
Status: COMPLETED | OUTPATIENT
Start: 2023-08-15 | End: 2023-08-15

## 2023-08-15 RX ORDER — MIDAZOLAM HYDROCHLORIDE 1 MG/ML
0.5 INJECTION INTRAMUSCULAR; INTRAVENOUS
Status: DISCONTINUED | OUTPATIENT
Start: 2023-08-15 | End: 2023-08-15 | Stop reason: HOSPADM

## 2023-08-15 RX ORDER — OXYCODONE HYDROCHLORIDE AND ACETAMINOPHEN 5; 325 MG/1; MG/1
1 TABLET ORAL ONCE AS NEEDED
Status: DISCONTINUED | OUTPATIENT
Start: 2023-08-15 | End: 2023-08-15 | Stop reason: HOSPADM

## 2023-08-15 RX ORDER — PROPOFOL 10 MG/ML
INJECTION, EMULSION INTRAVENOUS AS NEEDED
Status: DISCONTINUED | OUTPATIENT
Start: 2023-08-15 | End: 2023-08-15 | Stop reason: SURG

## 2023-08-15 RX ORDER — FENTANYL CITRATE 50 UG/ML
50 INJECTION, SOLUTION INTRAMUSCULAR; INTRAVENOUS
Status: DISCONTINUED | OUTPATIENT
Start: 2023-08-15 | End: 2023-08-15 | Stop reason: HOSPADM

## 2023-08-15 RX ORDER — SODIUM CHLORIDE, SODIUM LACTATE, POTASSIUM CHLORIDE, CALCIUM CHLORIDE 600; 310; 30; 20 MG/100ML; MG/100ML; MG/100ML; MG/100ML
100 INJECTION, SOLUTION INTRAVENOUS ONCE AS NEEDED
Status: DISCONTINUED | OUTPATIENT
Start: 2023-08-15 | End: 2023-08-15 | Stop reason: HOSPADM

## 2023-08-15 RX ORDER — LIDOCAINE HYDROCHLORIDE 20 MG/ML
INJECTION, SOLUTION EPIDURAL; INFILTRATION; INTRACAUDAL; PERINEURAL AS NEEDED
Status: DISCONTINUED | OUTPATIENT
Start: 2023-08-15 | End: 2023-08-15 | Stop reason: SURG

## 2023-08-15 RX ADMIN — PROPOFOL 50 MG: 10 INJECTION, EMULSION INTRAVENOUS at 14:28

## 2023-08-15 RX ADMIN — SODIUM CHLORIDE, POTASSIUM CHLORIDE, SODIUM LACTATE AND CALCIUM CHLORIDE: 600; 310; 30; 20 INJECTION, SOLUTION INTRAVENOUS at 14:10

## 2023-08-15 RX ADMIN — PROPOFOL 50 MG: 10 INJECTION, EMULSION INTRAVENOUS at 14:23

## 2023-08-15 RX ADMIN — PROPOFOL 50 MG: 10 INJECTION, EMULSION INTRAVENOUS at 14:43

## 2023-08-15 RX ADMIN — PROPOFOL 50 MG: 10 INJECTION, EMULSION INTRAVENOUS at 14:38

## 2023-08-15 RX ADMIN — LIDOCAINE HYDROCHLORIDE 100 MG: 20 INJECTION, SOLUTION EPIDURAL; INFILTRATION; INTRACAUDAL; PERINEURAL at 14:10

## 2023-08-15 RX ADMIN — PROPOFOL 50 MG: 10 INJECTION, EMULSION INTRAVENOUS at 14:33

## 2023-08-15 RX ADMIN — PROPOFOL 50 MG: 10 INJECTION, EMULSION INTRAVENOUS at 14:13

## 2023-08-15 RX ADMIN — PROPOFOL 50 MG: 10 INJECTION, EMULSION INTRAVENOUS at 14:18

## 2023-08-15 NOTE — ANESTHESIA POSTPROCEDURE EVALUATION
Patient: Yamile Angel    Procedure Summary       Date: 08/15/23 Room / Location:  [facility]  /  [facility]    Anesthesia Start: 1410 Anesthesia Stop: 1455    Procedures:       ESOPHAGOGASTRODUODENOSCOPY WITH BIOPSY (Esophagus)      COLONOSCOPY FOR SCREENING Diagnosis:       Gastroesophageal reflux disease, unspecified whether esophagitis present      Chronic gastric ulcer, unspecified whether gastric ulcer hemorrhage or perforation present      Epigastric pain      Anemia, unspecified type      Weight loss      Encounter for screening for malignant neoplasm of colon      Family history of colon cancer      (Gastroesophageal reflux disease, unspecified whether esophagitis present [K21.9])      (Chronic gastric ulcer, unspecified whether gastric ulcer hemorrhage or perforation present [K25.7])      (Epigastric pain [R10.13])      (Anemia, unspecified type [D64.9])      (Weight loss [R63.4])      (Encounter for screening for malignant neoplasm of colon [Z12.11])      (Family history of colon cancer [Z80.0])    Surgeons: Rhonda Parada MD Provider: Warren Titus MD    Anesthesia Type: general ASA Status: 4            Anesthesia Type: general    Vitals  Vitals Value Taken Time   /63 08/15/23 1502   Temp 97.5 øF (36.4 øC) 08/15/23 1502   Pulse 74 08/15/23 1502   Resp 20 08/15/23 1502   SpO2 100 % 08/15/23 1502           Post Anesthesia Care and Evaluation    Patient location during evaluation: PHASE II  Patient participation: complete - patient participated  Level of consciousness: awake and alert  Pain score: 0  Pain management: adequate    Airway patency: patent  Anesthetic complications: No anesthetic complications    Cardiovascular status: acceptable  Respiratory status: acceptable  Hydration status: acceptable

## 2023-08-15 NOTE — ANESTHESIA PREPROCEDURE EVALUATION
Anesthesia Evaluation     Patient summary reviewed and Nursing notes reviewed   no history of anesthetic complications:   NPO Solid Status: > 8 hours  NPO Liquid Status: > 8 hours           Airway   Mallampati: III  TM distance: >3 FB  Neck ROM: full  Possible difficult intubation  Dental      Pulmonary    (+) a smoker, COPD (MDI ) moderate,sleep apnea  (-) shortness of breath, recent URI  Cardiovascular     ECG reviewed    (+) hypertension, valvular problems/murmurs (HOCM)dysrhythmias Paroxysmal Atrial Fib, angina (rare ), hyperlipidemia  (-) past MI, cardiac stents    ROS comment: HOCM   1/1/23- EF > 61% , LV intracavitary gradient 70mmhg  - poss dynamic  proximal ventricular obstruction, gr1dd, nl rvsf/sz, nl valves    Neuro/Psych  (-) seizures, CVA  GI/Hepatic/Renal/Endo    (+) GERD, PUD, renal disease  (-) diabetes, no thyroid disorder    ROS Comment: Mesenteric ischemia     Musculoskeletal     Abdominal    Substance History      OB/GYN          Other   arthritis,     ROS/Med Hx Other: eliquis   HCT 30   K 3.2                     Anesthesia Plan    ASA 4     general     (PFL   Fluid bolus - avoid sympathetic stim -B blocker s/b  )  intravenous induction     Anesthetic plan, risks, benefits, and alternatives have been provided, discussed and informed consent has been obtained with: patient.    Plan discussed with CRNA.      CODE STATUS:

## 2023-08-16 ENCOUNTER — INFUSION (OUTPATIENT)
Dept: ONCOLOGY | Facility: HOSPITAL | Age: 78
End: 2023-08-16
Payer: MEDICARE

## 2023-08-16 VITALS
SYSTOLIC BLOOD PRESSURE: 118 MMHG | HEART RATE: 76 BPM | OXYGEN SATURATION: 97 % | RESPIRATION RATE: 18 BRPM | TEMPERATURE: 98.6 F | DIASTOLIC BLOOD PRESSURE: 59 MMHG

## 2023-08-16 DIAGNOSIS — D50.9 IRON DEFICIENCY ANEMIA, UNSPECIFIED IRON DEFICIENCY ANEMIA TYPE: Primary | ICD-10-CM

## 2023-08-16 PROCEDURE — 96366 THER/PROPH/DIAG IV INF ADDON: CPT

## 2023-08-16 PROCEDURE — 96365 THER/PROPH/DIAG IV INF INIT: CPT

## 2023-08-16 PROCEDURE — 25010000002 IRON SUCROSE PER 1 MG: Performed by: NURSE PRACTITIONER

## 2023-08-16 RX ADMIN — IRON SUCROSE 300 MG: 20 INJECTION, SOLUTION INTRAVENOUS at 10:14

## 2023-08-17 ENCOUNTER — INFUSION (OUTPATIENT)
Dept: ONCOLOGY | Facility: HOSPITAL | Age: 78
End: 2023-08-17
Payer: MEDICARE

## 2023-08-17 VITALS
DIASTOLIC BLOOD PRESSURE: 74 MMHG | RESPIRATION RATE: 18 BRPM | SYSTOLIC BLOOD PRESSURE: 163 MMHG | OXYGEN SATURATION: 97 % | HEART RATE: 82 BPM | TEMPERATURE: 98 F

## 2023-08-17 DIAGNOSIS — D50.9 IRON DEFICIENCY ANEMIA, UNSPECIFIED IRON DEFICIENCY ANEMIA TYPE: Primary | ICD-10-CM

## 2023-08-17 LAB — REF LAB TEST METHOD: NORMAL

## 2023-08-17 PROCEDURE — 96365 THER/PROPH/DIAG IV INF INIT: CPT

## 2023-08-17 PROCEDURE — 25010000002 IRON SUCROSE PER 1 MG: Performed by: NURSE PRACTITIONER

## 2023-08-17 PROCEDURE — 96366 THER/PROPH/DIAG IV INF ADDON: CPT

## 2023-08-17 RX ADMIN — IRON SUCROSE 300 MG: 20 INJECTION, SOLUTION INTRAVENOUS at 10:10

## 2023-08-18 ENCOUNTER — INFUSION (OUTPATIENT)
Dept: ONCOLOGY | Facility: HOSPITAL | Age: 78
End: 2023-08-18
Payer: MEDICARE

## 2023-08-18 VITALS
HEART RATE: 83 BPM | RESPIRATION RATE: 18 BRPM | DIASTOLIC BLOOD PRESSURE: 64 MMHG | OXYGEN SATURATION: 95 % | TEMPERATURE: 98 F | SYSTOLIC BLOOD PRESSURE: 133 MMHG

## 2023-08-18 DIAGNOSIS — D50.9 IRON DEFICIENCY ANEMIA, UNSPECIFIED IRON DEFICIENCY ANEMIA TYPE: Primary | ICD-10-CM

## 2023-08-18 PROCEDURE — 25010000002 IRON SUCROSE PER 1 MG: Performed by: NURSE PRACTITIONER

## 2023-08-18 PROCEDURE — 96366 THER/PROPH/DIAG IV INF ADDON: CPT

## 2023-08-18 PROCEDURE — 96365 THER/PROPH/DIAG IV INF INIT: CPT

## 2023-08-18 RX ADMIN — IRON SUCROSE 300 MG: 20 INJECTION, SOLUTION INTRAVENOUS at 10:07

## 2023-08-22 NOTE — PROGRESS NOTES
Recently, you underwent upper endoscopy to check healing epigastric ulcer.  The ulcer has healed.  Biopsies of the stomach were negative for H. pylori gastritis.  An area of inflammation was seen in the colon.  A biopsy was taken.  The biopsy was lost upon retrieval.  Please keep your follow-up appointment.

## 2023-09-20 ENCOUNTER — TRANSCRIBE ORDERS (OUTPATIENT)
Dept: ADMINISTRATIVE | Facility: HOSPITAL | Age: 78
End: 2023-09-20
Payer: MEDICARE

## 2023-09-20 DIAGNOSIS — R10.13 EPIGASTRIC PAIN: Primary | ICD-10-CM

## 2023-09-25 ENCOUNTER — HOSPITAL ENCOUNTER (OUTPATIENT)
Dept: CT IMAGING | Facility: HOSPITAL | Age: 78
Discharge: HOME | End: 2023-09-25
Admitting: FAMILY MEDICINE
Payer: MEDICARE

## 2023-09-25 DIAGNOSIS — R10.13 EPIGASTRIC PAIN: ICD-10-CM

## 2023-09-25 LAB — CREAT BLDA-MCNC: 0.6 MG/DL (ref 0.6–1.3)

## 2023-09-25 PROCEDURE — 74177 CT ABD & PELVIS W/CONTRAST: CPT

## 2023-09-25 PROCEDURE — 25510000001 IOPAMIDOL 61 % SOLUTION: Performed by: FAMILY MEDICINE

## 2023-09-25 PROCEDURE — 82565 ASSAY OF CREATININE: CPT

## 2023-09-25 PROCEDURE — 74177 CT ABD & PELVIS W/CONTRAST: CPT | Performed by: RADIOLOGY

## 2023-09-25 RX ADMIN — IOPAMIDOL 100 ML: 612 INJECTION, SOLUTION INTRAVENOUS at 10:09

## 2023-10-12 ENCOUNTER — OFFICE VISIT (OUTPATIENT)
Dept: GASTROENTEROLOGY | Facility: CLINIC | Age: 78
End: 2023-10-12
Payer: MEDICARE

## 2023-10-12 VITALS
WEIGHT: 110.4 LBS | SYSTOLIC BLOOD PRESSURE: 110 MMHG | BODY MASS INDEX: 20.32 KG/M2 | HEART RATE: 75 BPM | DIASTOLIC BLOOD PRESSURE: 66 MMHG | HEIGHT: 62 IN

## 2023-10-12 DIAGNOSIS — K21.9 GASTROESOPHAGEAL REFLUX DISEASE, UNSPECIFIED WHETHER ESOPHAGITIS PRESENT: ICD-10-CM

## 2023-10-12 DIAGNOSIS — D64.9 ANEMIA, UNSPECIFIED TYPE: Primary | ICD-10-CM

## 2023-10-12 LAB
ALBUMIN SERPL-MCNC: 3.1 G/DL (ref 3.5–5.2)
ALBUMIN/GLOB SERPL: 1 G/DL
ALP SERPL-CCNC: 81 U/L (ref 39–117)
ALT SERPL W P-5'-P-CCNC: 11 U/L (ref 1–33)
ANION GAP SERPL CALCULATED.3IONS-SCNC: 8.9 MMOL/L (ref 5–15)
AST SERPL-CCNC: 15 U/L (ref 1–32)
BASOPHILS # BLD AUTO: 0.03 10*3/MM3 (ref 0–0.2)
BASOPHILS NFR BLD AUTO: 0.4 % (ref 0–1.5)
BILIRUB SERPL-MCNC: 0.3 MG/DL (ref 0–1.2)
BUN SERPL-MCNC: 18 MG/DL (ref 8–23)
BUN/CREAT SERPL: 27.7 (ref 7–25)
CALCIUM SPEC-SCNC: 8.3 MG/DL (ref 8.6–10.5)
CHLORIDE SERPL-SCNC: 109 MMOL/L (ref 98–107)
CO2 SERPL-SCNC: 24.1 MMOL/L (ref 22–29)
CREAT SERPL-MCNC: 0.65 MG/DL (ref 0.57–1)
DEPRECATED RDW RBC AUTO: 47.4 FL (ref 37–54)
EGFRCR SERPLBLD CKD-EPI 2021: 90.2 ML/MIN/1.73
EOSINOPHIL # BLD AUTO: 0.46 10*3/MM3 (ref 0–0.4)
EOSINOPHIL NFR BLD AUTO: 6.2 % (ref 0.3–6.2)
ERYTHROCYTE [DISTWIDTH] IN BLOOD BY AUTOMATED COUNT: 14 % (ref 12.3–15.4)
FERRITIN SERPL-MCNC: 51.5 NG/ML (ref 13–150)
FOLATE SERPL-MCNC: >20 NG/ML (ref 4.78–24.2)
GLOBULIN UR ELPH-MCNC: 3.2 GM/DL
GLUCOSE SERPL-MCNC: 84 MG/DL (ref 65–99)
HCT VFR BLD AUTO: 24.7 % (ref 34–46.6)
HGB BLD-MCNC: 7.5 G/DL (ref 12–15.9)
IMM GRANULOCYTES # BLD AUTO: 0.01 10*3/MM3 (ref 0–0.05)
IMM GRANULOCYTES NFR BLD AUTO: 0.1 % (ref 0–0.5)
IRON 24H UR-MRATE: 19 MCG/DL (ref 37–145)
IRON SATN MFR SERPL: 7 % (ref 20–50)
LYMPHOCYTES # BLD AUTO: 2.7 10*3/MM3 (ref 0.7–3.1)
LYMPHOCYTES NFR BLD AUTO: 36.2 % (ref 19.6–45.3)
MCH RBC QN AUTO: 28.6 PG (ref 26.6–33)
MCHC RBC AUTO-ENTMCNC: 30.4 G/DL (ref 31.5–35.7)
MCV RBC AUTO: 94.3 FL (ref 79–97)
MONOCYTES # BLD AUTO: 0.49 10*3/MM3 (ref 0.1–0.9)
MONOCYTES NFR BLD AUTO: 6.6 % (ref 5–12)
NEUTROPHILS NFR BLD AUTO: 3.76 10*3/MM3 (ref 1.7–7)
NEUTROPHILS NFR BLD AUTO: 50.5 % (ref 42.7–76)
NRBC BLD AUTO-RTO: 0.1 /100 WBC (ref 0–0.2)
PLATELET # BLD AUTO: 199 10*3/MM3 (ref 140–450)
PMV BLD AUTO: 11.9 FL (ref 6–12)
POTASSIUM SERPL-SCNC: 4.2 MMOL/L (ref 3.5–5.2)
PROT SERPL-MCNC: 6.3 G/DL (ref 6–8.5)
RBC # BLD AUTO: 2.62 10*6/MM3 (ref 3.77–5.28)
SODIUM SERPL-SCNC: 142 MMOL/L (ref 136–145)
TIBC SERPL-MCNC: 258 MCG/DL (ref 298–536)
TRANSFERRIN SERPL-MCNC: 173 MG/DL (ref 200–360)
VIT B12 BLD-MCNC: 400 PG/ML (ref 211–946)
WBC NRBC COR # BLD: 7.45 10*3/MM3 (ref 3.4–10.8)

## 2023-10-12 PROCEDURE — 83540 ASSAY OF IRON: CPT | Performed by: PHYSICIAN ASSISTANT

## 2023-10-12 PROCEDURE — 82746 ASSAY OF FOLIC ACID SERUM: CPT | Performed by: PHYSICIAN ASSISTANT

## 2023-10-12 PROCEDURE — 82728 ASSAY OF FERRITIN: CPT | Performed by: PHYSICIAN ASSISTANT

## 2023-10-12 PROCEDURE — 85025 COMPLETE CBC W/AUTO DIFF WBC: CPT | Performed by: PHYSICIAN ASSISTANT

## 2023-10-12 PROCEDURE — 82607 VITAMIN B-12: CPT | Performed by: PHYSICIAN ASSISTANT

## 2023-10-12 PROCEDURE — 80053 COMPREHEN METABOLIC PANEL: CPT | Performed by: PHYSICIAN ASSISTANT

## 2023-10-12 PROCEDURE — 84466 ASSAY OF TRANSFERRIN: CPT | Performed by: PHYSICIAN ASSISTANT

## 2023-10-12 RX ORDER — LANSOPRAZOLE 30 MG/1
30 CAPSULE, DELAYED RELEASE ORAL 2 TIMES DAILY
Qty: 60 CAPSULE | Refills: 11 | Status: SHIPPED | OUTPATIENT
Start: 2023-10-12

## 2023-10-12 NOTE — PROGRESS NOTES
DATE:  10/12/2023    DIAGNOSIS: Epigastric abdominal pain, loss of appetite, GERD    CHIEF COMPLAINT:  Chief Complaint   Patient presents with    GERD     HPI: Cyndi MARIN  Mr. Angel presents today for follow up. She was previously seen by Celio Wynn PA-C and was last seen in November 2021. At that time, she was seen for drug induced constipation and was started on Movantik. Also attempted colonoscopy in December 2021 but had inadequate colon prep. Repeat colonoscopy was recommended in 1 year but she has not had this done. Since February, she has been feeling poorly with complaints of epigastric pain, nausea and vomiting and weight loss. She weighed ~147 lbs in January 2023 per medical records and currently weighs 105 lbs. In February 2023, she was admitted to [facility] with epigastric pain. She underwent MRCP which showed choledocholithiasis and subsequently underwent ERCP and sphincterotomy on 2/13/23, balloon sweeps yielded a single small black stone. Also noted a 2 x 3 cm ulcer in the gastric fundus, and diffuse moderate gastritis, also scalloping and superficial linear ulcers in the duodenum. Pathology was benign and negative for H. Pylori and celiac disease. She was started on PPI therapy and had repeat EGD on 4/11/23 which showed a non-bleeding gastric ulcer with no stigmata of bleeding.Pathology was again benign. At present, she continues to struggle with epigastric pain particularly after eating. She says Protonix caused nausea and vomiting and therefore has discontinued. She is currently take Pepcid BID. She continues to have occasional nausea and vomiting. Denies burning in her throat or chest. She continues to lose weight as above. She complains of fatigue and weakness. Most recent CBC from April 2023 showed macrocytic anemia with Hg 9.1. She says she previously took oral iron which she discontinued as this caused worsening nausea. She denies having melena or BRBPR. She reports her  sister had colon cancer. She reports her bowels are moving well with Movantik and chronically takes Percocet. Denies NSAID use. She has no other complaints today.       Interval History:  Patient reports that she has been doing somewhat better since she was last seen in clinic by Cyndi MARIN.  She recently did undergo an EGD and colonoscopy with Dr. Nichols in August 2023.  He has a history of a gastric ulcer that had resolved on last EGD.  She continued to have some moderate gastritis noted however.  Colonoscopy results revealed some Tatian occurring in the cecum however the tissue was lost upon retrieval therefore we do not have definitive results on this.  Patient has gained roughly 5 pounds since she was last seen in clinic.  She states that her appetite is doing somewhat better.  She continues to have some epigastric abdominal pain with eating.  She has not been taking the omeprazole due to it causing increased bouts of nausea.  Patient states that she does have nausea that occurs daily but denies vomiting.  She did undergo 3 iron infusions that was ordered by Cyndi Almeida as well as taking daily folic acid.  She has not had any labs performed since she was last seen in clinic.  She has underwent a recent CT scan ordered by Dr. Mcmahon that showed very abundant stool throughout the colon.    The following portions of the patient's history were reviewed and updated as appropriate: allergies, current medications, past family history, past medical history, past social history, past surgical history and problem list.  REVIEW OF SYSTEMS:   Review of Systems   Constitutional: Negative.  Negative for appetite change and unexpected weight change.   HENT:  Negative for sore throat and trouble swallowing.    Eyes: Negative.    Respiratory:  Negative for chest tightness.    Cardiovascular:  Negative for chest pain.   Gastrointestinal:  Positive for abdominal distention, abdominal pain, constipation and  nausea. Negative for anal bleeding, blood in stool, diarrhea, rectal pain and vomiting.   Endocrine: Negative.    Genitourinary:  Negative for difficulty urinating.   Musculoskeletal:  Positive for back pain. Negative for neck pain.   Skin: Negative.    Allergic/Immunologic: Negative for environmental allergies and food allergies.   Neurological:  Positive for dizziness and light-headedness. Negative for headaches.   Hematological:  Bruises/bleeds easily.   Psychiatric/Behavioral:  Positive for agitation and sleep disturbance. The patient is nervous/anxious.        PAST MEDICAL HISTORY:  Past Medical History:   Diagnosis Date    Anemia     Arthritis     Atrial fibrillation     Choledocholithiasis 02/11/2023    COPD (chronic obstructive pulmonary disease)     Elevated cholesterol     Heart murmur     History of claustrophobia     History of transfusion 2018    2 units PRBC at [facility] for anemia, pt denies transfusion reaction    HOCM (hypertrophic obstructive cardiomyopathy) 01/19/2023    Hyperlipidemia     Hypertension     Sepsis with acute renal failure without septic shock, due to unspecified organism, unspecified acute renal failure type 12/31/2022    Sleep apnea     NON-COMPLIANT with CPAP       PAST SURGICAL HISTORY:  Past Surgical History:   Procedure Laterality Date    APPENDECTOMY      BREAST BIOPSY Left     CATARACT EXTRACTION EXTRACAPSULAR W/ INTRAOCULAR LENS IMPLANTATION Bilateral     COLONOSCOPY      COLONOSCOPY N/A 12/15/2021    Procedure: COLONOSCOPY FOR SCREENING;  Surgeon: Rhonda Parada MD;  Location: [facility] OR;  Service: Gastroenterology;  Laterality: N/A;    COLONOSCOPY N/A 8/15/2023    Procedure: COLONOSCOPY FOR SCREENING;  Surgeon: Rhonda Parada MD;  Location: [facility] OR;  Service: Gastroenterology;  Laterality: N/A;    ENDOSCOPY N/A 04/11/2023    Procedure: ESOPHAGOGASTRODUODENOSCOPY;  Surgeon: Shaquille Dupont MD;  Location: [facility] ENDOSCOPY;  Service:  Gastroenterology;  Laterality: N/A;    ENDOSCOPY N/A 8/15/2023    Procedure: ESOPHAGOGASTRODUODENOSCOPY WITH BIOPSY;  Surgeon: Rhonda Parada MD;  Location[facility];  Service: Gastroenterology;  Laterality: N/A;    ERCP N/A 02/13/2023    Procedure: ENDOSCOPIC RETROGRADE CHOLANGIOPANCREATOGRAPHY;  Surgeon: Brunner, Mark I, MD;  Location:  [facility];  Service: Gastroenterology;  Laterality: N/A;    HYSTERECTOMY      SHOULDER SURGERY Right     Tendon Repair    TUBAL ABDOMINAL LIGATION      UPPER GASTROINTESTINAL ENDOSCOPY      WRIST SURGERY Right        SOCIAL HISTORY:  Social History     Socioeconomic History    Marital status:    Tobacco Use    Smoking status: Every Day     Packs/day: 1.00     Years: 65.00     Additional pack years: 0.00     Total pack years: 65.00     Types: Cigarettes     Start date: 1958    Smokeless tobacco: Never   Vaping Use    Vaping Use: Former   Substance and Sexual Activity    Alcohol use: Never    Drug use: Never    Sexual activity: Defer       FAMILY HISTORY:  Family History   Problem Relation Age of Onset    Heart disease Mother     Cancer Father        MEDICATIONS:      Current Outpatient Medications:     albuterol sulfate  (90 Base) MCG/ACT inhaler, Inhale 2 puffs by mouth every 4 (Four) Hours As Needed for Wheezing., Disp: 18 g, Rfl: 0    apixaban (ELIQUIS) 5 MG tablet tablet, Take 1 tablet by mouth 2 (Two) Times a Day. Indications: home medication (Patient taking differently: Take 1 tablet by mouth 2 (Two) Times a Day. Indications: Atrial Fibrillation, home medication), Disp: 60 tablet, Rfl: 0    cyanocobalamin 1000 MCG/ML injection, Inject 1 mL under the skin into the appropriate area as directed Every 30 (Thirty) Days., Disp: , Rfl:     ferrous sulfate 325 (65 FE) MG tablet, Take 1 tablet by mouth 2 (Two) Times a Day., Disp: , Rfl:     folic acid (FOLVITE) 1 MG tablet, Take 1 tablet by mouth Daily., Disp: 30 tablet, Rfl: 11    gabapentin  "(NEURONTIN) 600 MG tablet, Take 1 tablet by mouth 3 (Three) Times a Day., Disp: , Rfl:     ipratropium-albuterol (DUO-NEB) 0.5-2.5 mg/3 ml nebulizer, Take 3 mL by neb every 30 minutes as needed for shortness of air for up to 6 doses. Part of COPD Rescue Kit. (Only Start if in YELLOW ZONE.), Disp: 18 mL, Rfl: 0    metoprolol succinate XL (TOPROL-XL) 50 MG 24 hr tablet, Take 1 tablet by mouth Daily., Disp: , Rfl:     Naloxegol Oxalate (Movantik) 25 MG tablet, Take 1 tablet by mouth Every Morning., Disp: 30 tablet, Rfl: 11    ondansetron (ZOFRAN) 8 MG tablet, Take 1 tablet by mouth 2 (Two) Times a Day As Needed for Nausea or Vomiting., Disp: , Rfl:     oxyCODONE-acetaminophen (PERCOCET)  MG per tablet, Take 1 tablet by mouth 4 (Four) Times a Day., Disp: , Rfl:     sucralfate (Carafate) 1 GM/10ML suspension, Take 10 mL by mouth 4 (Four) Times a Day., Disp: 414 mL, Rfl: 2    traZODone (DESYREL) 50 MG tablet, Take 1/2 tablet by mouth Every Night., Disp: 15 tablet, Rfl: 0    verapamil SR (CALAN-SR) 180 MG CR tablet, Take 1 tablet by mouth Daily. (Patient taking differently: Take 1 tablet by mouth Daily. 4/10/23: Patient states, \"the nurse (Jo Ann Lua) at my heart doctor's office told me I could stop taking any medicine that was making me sick to my stomach, so I stopped them all on 2/21/23\"), Disp: 60 tablet, Rfl: 3    lansoprazole (PREVACID) 30 MG capsule, Take 1 capsule by mouth 2 (Two) Times a Day., Disp: 60 capsule, Rfl: 11    ALLERGIES:    Allergies   Allergen Reactions    Penicillins Rash    Sulfa Antibiotics Rash       VIST VITALS/PHYSICAL EXAM:  /66   Pulse 75   Ht 157.5 cm (62\")   Wt 50.1 kg (110 lb 6.4 oz)   BMI 20.19 kg/mý   Physical Exam  Constitutional:       General: She is not in acute distress.     Appearance: Normal appearance. She is well-developed.   HENT:      Head: Normocephalic and atraumatic.   Eyes:      Pupils: Pupils are equal, round, and reactive to light.   Pulmonary:      " Effort: Pulmonary effort is normal. No respiratory distress.   Abdominal:      General: Abdomen is flat. There is no distension.      Palpations: Abdomen is soft. There is no mass.      Tenderness: There is no abdominal tenderness. There is no guarding or rebound.      Hernia: No hernia is present.   Musculoskeletal:         General: No swelling. Normal range of motion.      Cervical back: Normal range of motion.   Skin:     General: Skin is warm and dry.   Neurological:      Mental Status: She is alert and oriented to person, place, and time.   Psychiatric:         Attention and Perception: Attention normal.         Mood and Affect: Mood normal.         Speech: Speech normal.         Behavior: Behavior normal. Behavior is cooperative.         Thought Content: Thought content normal.           PATHOLOGY:  TISSUE EXAM, [facility]) (08/15/2023 14:21)       ENDOSCOPY:  UPPER GI ENDOSCOPY (08/15/2023 14:12)   COLONOSCOPY (08/15/2023 14:24)     IMAGING:  CT Abdomen Pelvis With Contrast    Result Date: 9/25/2023  1.  Bladder wall thickening which may be due to the decompressed state of the bladder or due to cystitis. 2.  Very abundant stool throughout the colon. 3.  Degenerative changes lumbar spine as described.   This report was finalized on 9/25/2023 10:26 AM by Dr. Jere Ayala MD.            RECENT LABS:  Lab Results   Component Value Date    WBC 14.84 (H) 08/09/2023    HGB 8.1 (L) 08/09/2023    HCT 24.7 (L) 08/09/2023    MCV 90.5 08/09/2023    RDW 15.0 08/09/2023     08/09/2023    NEUTRORELPCT 75.3 08/09/2023    LYMPHORELPCT 16.0 (L) 08/09/2023    MONORELPCT 5.6 08/09/2023    EOSRELPCT 2.2 08/09/2023    BASORELPCT 0.3 08/09/2023    NEUTROABS 11.19 (H) 08/09/2023    LYMPHSABS 2.37 08/09/2023       Lab Results   Component Value Date     08/09/2023    K 3.9 08/09/2023    CO2 27.0 08/09/2023     08/09/2023    BUN 16 08/09/2023    CREATININE 0.60 09/25/2023    GLUCOSE 109 (H) 08/09/2023     CALCIUM 8.8 08/09/2023    ALKPHOS 92 08/09/2023    AST 12 08/09/2023    ALT 8 08/09/2023    BILITOT 0.4 08/09/2023    ALBUMIN 3.8 08/09/2023    PROTEINTOT 6.8 08/09/2023    MG 2.0 02/12/2023             ASSESSMENT & PLAN:  Patient's recent EGD/colonoscopy results were reviewed with her-she voiced understanding of these results.  She was unable to take omeprazole 40 mg twice daily therefore prescription of lansoprazole 30 mg twice daily dosing will be sent to her pharmacy.  Patient was instructed on the importance of PPI therapy in healing her gastritis.  I will also be ordering labs to follow-up with her anemia.   Diagnosis Plan   1. Anemia, unspecified type  CBC & Differential    Comprehensive Metabolic Panel    Iron Profile    Ferritin    Vitamin B12    Folate      2. Gastroesophageal reflux disease, unspecified whether esophagitis present  lansoprazole (PREVACID) 30 MG capsule          Return in about 6 weeks (around 11/23/2023).        Electronically Signed by: Celio Wynn PA-C , October 12, 2023 13:18 EDT       CC:   Cristofer Garcia MD

## 2023-10-13 ENCOUNTER — TELEPHONE (OUTPATIENT)
Dept: GASTROENTEROLOGY | Facility: CLINIC | Age: 78
End: 2023-10-13
Payer: MEDICARE

## 2023-10-13 DIAGNOSIS — D64.9 ANEMIA, UNSPECIFIED TYPE: Primary | ICD-10-CM

## 2023-10-13 NOTE — TELEPHONE ENCOUNTER
Please let patient know that she continues to be very anemic therefore I am placing a hematology referral for her.  Her hemoglobin level is 7.5 which is down from roughly 2 months ago at 8.2.  Her iron levels also continue to be low despite her iron infusions.

## 2023-10-17 ENCOUNTER — TELEPHONE (OUTPATIENT)
Dept: GASTROENTEROLOGY | Facility: CLINIC | Age: 78
End: 2023-10-17
Payer: MEDICARE

## 2023-10-17 DIAGNOSIS — D50.0 IRON DEFICIENCY ANEMIA DUE TO CHRONIC BLOOD LOSS: Primary | ICD-10-CM

## 2023-10-17 NOTE — TELEPHONE ENCOUNTER
Spoke with patient and scheduled her for her Pillcam for 10-26-23. I went over instructions as well as mailed them to her.

## 2023-10-17 NOTE — TELEPHONE ENCOUNTER
----- Message from Cyndi Almeida, APRN sent at 10/17/2023 11:08 AM EDT -----  I have ordered pill cam endoscopy on Ms. Angel for ZUNILDA. She has recently followed with both me and Celio. Celio referred her to hematology and has appointment next week. Do you care to please let patient know that we will proceed with pill cam endoscopy to evaluate her anemia and keep appointment with hematology as scheduled? Please make sure her follow up with me is adjusted for at least a couple of weeks after pill cam endoscopy. Thanks ! If have any questions, just let me know.

## 2023-10-23 ENCOUNTER — CONSULT (OUTPATIENT)
Dept: ONCOLOGY | Facility: CLINIC | Age: 78
End: 2023-10-23
Payer: MEDICARE

## 2023-10-23 ENCOUNTER — LAB (OUTPATIENT)
Dept: ONCOLOGY | Facility: CLINIC | Age: 78
End: 2023-10-23
Payer: MEDICARE

## 2023-10-23 VITALS
DIASTOLIC BLOOD PRESSURE: 73 MMHG | BODY MASS INDEX: 19.58 KG/M2 | OXYGEN SATURATION: 96 % | WEIGHT: 106.4 LBS | RESPIRATION RATE: 18 BRPM | HEART RATE: 95 BPM | HEIGHT: 62 IN | SYSTOLIC BLOOD PRESSURE: 143 MMHG | TEMPERATURE: 97.7 F

## 2023-10-23 DIAGNOSIS — D64.9 ANEMIA, UNSPECIFIED TYPE: Primary | ICD-10-CM

## 2023-10-23 DIAGNOSIS — D64.9 NORMOCYTIC ANEMIA: Primary | ICD-10-CM

## 2023-10-23 LAB
ALBUMIN SERPL-MCNC: 3.8 G/DL (ref 3.5–5.2)
ALBUMIN/GLOB SERPL: 1.2 G/DL
ALP SERPL-CCNC: 90 U/L (ref 39–117)
ALT SERPL W P-5'-P-CCNC: 13 U/L (ref 1–33)
ANION GAP SERPL CALCULATED.3IONS-SCNC: 9 MMOL/L (ref 5–15)
ANISOCYTOSIS BLD QL: NORMAL
AST SERPL-CCNC: 29 U/L (ref 1–32)
BASOPHILS # BLD AUTO: 0.05 10*3/MM3 (ref 0–0.2)
BASOPHILS NFR BLD AUTO: 0.3 % (ref 0–1.5)
BILIRUB SERPL-MCNC: 0.6 MG/DL (ref 0–1.2)
BUN SERPL-MCNC: 18 MG/DL (ref 8–23)
BUN/CREAT SERPL: 21.7 (ref 7–25)
CALCIUM SPEC-SCNC: 8.3 MG/DL (ref 8.6–10.5)
CHLORIDE SERPL-SCNC: 105 MMOL/L (ref 98–107)
CO2 SERPL-SCNC: 27 MMOL/L (ref 22–29)
CREAT SERPL-MCNC: 0.83 MG/DL (ref 0.57–1)
CRP SERPL-MCNC: 3.06 MG/DL (ref 0–0.5)
DACRYOCYTES BLD QL SMEAR: NORMAL
DEPRECATED RDW RBC AUTO: 79.6 FL (ref 37–54)
EGFRCR SERPLBLD CKD-EPI 2021: 72.3 ML/MIN/1.73
EOSINOPHIL # BLD AUTO: 0.87 10*3/MM3 (ref 0–0.4)
EOSINOPHIL NFR BLD AUTO: 5.5 % (ref 0.3–6.2)
ERYTHROCYTE [DISTWIDTH] IN BLOOD BY AUTOMATED COUNT: 20.3 % (ref 12.3–15.4)
ERYTHROCYTE [SEDIMENTATION RATE] IN BLOOD: 34 MM/HR (ref 0–30)
FERRITIN SERPL-MCNC: 795.9 NG/ML (ref 13–150)
FOLATE SERPL-MCNC: >20 NG/ML (ref 4.78–24.2)
GLOBULIN UR ELPH-MCNC: 3.1 GM/DL
GLUCOSE SERPL-MCNC: 103 MG/DL (ref 65–99)
HAPTOGLOB SERPL-MCNC: 158 MG/DL (ref 30–200)
HCT VFR BLD AUTO: 29.3 % (ref 34–46.6)
HGB BLD-MCNC: 8.4 G/DL (ref 12–15.9)
HYPOCHROMIA BLD QL: NORMAL
IMM GRANULOCYTES # BLD AUTO: 0.07 10*3/MM3 (ref 0–0.05)
IMM GRANULOCYTES NFR BLD AUTO: 0.4 % (ref 0–0.5)
IRON 24H UR-MRATE: 26 MCG/DL (ref 37–145)
IRON SATN MFR SERPL: 9 % (ref 20–50)
LDH SERPL-CCNC: 240 U/L (ref 135–214)
LYMPHOCYTES # BLD AUTO: 2.3 10*3/MM3 (ref 0.7–3.1)
LYMPHOCYTES NFR BLD AUTO: 14.7 % (ref 19.6–45.3)
MACROCYTES BLD QL SMEAR: NORMAL
MCH RBC QN AUTO: 30.9 PG (ref 26.6–33)
MCHC RBC AUTO-ENTMCNC: 28.7 G/DL (ref 31.5–35.7)
MCV RBC AUTO: 107.7 FL (ref 79–97)
MONOCYTES # BLD AUTO: 0.62 10*3/MM3 (ref 0.1–0.9)
MONOCYTES NFR BLD AUTO: 4 % (ref 5–12)
NEUTROPHILS NFR BLD AUTO: 11.77 10*3/MM3 (ref 1.7–7)
NEUTROPHILS NFR BLD AUTO: 75.1 % (ref 42.7–76)
NRBC BLD AUTO-RTO: 0 /100 WBC (ref 0–0.2)
PLAT MORPH BLD: NORMAL
PLATELET # BLD AUTO: 230 10*3/MM3 (ref 140–450)
PMV BLD AUTO: 10.3 FL (ref 6–12)
POLYCHROMASIA BLD QL SMEAR: NORMAL
POTASSIUM SERPL-SCNC: 4.1 MMOL/L (ref 3.5–5.2)
PROT SERPL-MCNC: 6.9 G/DL (ref 6–8.5)
RBC # BLD AUTO: 2.72 10*6/MM3 (ref 3.77–5.28)
RETICS # AUTO: 0.14 10*6/MM3 (ref 0.02–0.13)
RETICS/RBC NFR AUTO: 5.07 % (ref 0.7–1.9)
SODIUM SERPL-SCNC: 141 MMOL/L (ref 136–145)
TIBC SERPL-MCNC: 277 MCG/DL (ref 298–536)
TRANSFERRIN SERPL-MCNC: 186 MG/DL (ref 200–360)
TSH SERPL DL<=0.05 MIU/L-ACNC: 1.79 UIU/ML (ref 0.27–4.2)
VIT B12 BLD-MCNC: 616 PG/ML (ref 211–946)
WBC NRBC COR # BLD: 15.68 10*3/MM3 (ref 3.4–10.8)

## 2023-10-23 PROCEDURE — 82728 ASSAY OF FERRITIN: CPT | Performed by: NURSE PRACTITIONER

## 2023-10-23 PROCEDURE — 82746 ASSAY OF FOLIC ACID SERUM: CPT | Performed by: NURSE PRACTITIONER

## 2023-10-23 PROCEDURE — 83615 LACTATE (LD) (LDH) ENZYME: CPT | Performed by: NURSE PRACTITIONER

## 2023-10-23 PROCEDURE — 85045 AUTOMATED RETICULOCYTE COUNT: CPT | Performed by: NURSE PRACTITIONER

## 2023-10-23 PROCEDURE — 84630 ASSAY OF ZINC: CPT | Performed by: NURSE PRACTITIONER

## 2023-10-23 PROCEDURE — 84443 ASSAY THYROID STIM HORMONE: CPT | Performed by: NURSE PRACTITIONER

## 2023-10-23 PROCEDURE — 85652 RBC SED RATE AUTOMATED: CPT | Performed by: NURSE PRACTITIONER

## 2023-10-23 PROCEDURE — 85025 COMPLETE CBC W/AUTO DIFF WBC: CPT | Performed by: NURSE PRACTITIONER

## 2023-10-23 PROCEDURE — 86140 C-REACTIVE PROTEIN: CPT | Performed by: NURSE PRACTITIONER

## 2023-10-23 PROCEDURE — 83540 ASSAY OF IRON: CPT | Performed by: NURSE PRACTITIONER

## 2023-10-23 PROCEDURE — 82525 ASSAY OF COPPER: CPT | Performed by: NURSE PRACTITIONER

## 2023-10-23 PROCEDURE — 85007 BL SMEAR W/DIFF WBC COUNT: CPT | Performed by: NURSE PRACTITIONER

## 2023-10-23 PROCEDURE — 80053 COMPREHEN METABOLIC PANEL: CPT | Performed by: NURSE PRACTITIONER

## 2023-10-23 PROCEDURE — 83010 ASSAY OF HAPTOGLOBIN QUANT: CPT | Performed by: NURSE PRACTITIONER

## 2023-10-23 PROCEDURE — 86364 TISS TRNSGLTMNASE EA IG CLAS: CPT | Performed by: NURSE PRACTITIONER

## 2023-10-23 PROCEDURE — 82668 ASSAY OF ERYTHROPOIETIN: CPT | Performed by: NURSE PRACTITIONER

## 2023-10-23 PROCEDURE — 84466 ASSAY OF TRANSFERRIN: CPT | Performed by: NURSE PRACTITIONER

## 2023-10-23 PROCEDURE — 82607 VITAMIN B-12: CPT | Performed by: NURSE PRACTITIONER

## 2023-10-23 RX ORDER — MIRTAZAPINE 30 MG/1
30 TABLET, FILM COATED ORAL NIGHTLY
COMMUNITY

## 2023-10-23 RX ORDER — SIMVASTATIN 80 MG
80 TABLET ORAL NIGHTLY
COMMUNITY

## 2023-10-23 NOTE — PROGRESS NOTES
Venipuncture Blood Specimen Collection  Venipuncture performed in right arm by Savannah Evans MA with good hemostasis. Patient tolerated the procedure well without complications.   10/23/23   Savannah Evans MA

## 2023-10-24 ENCOUNTER — APPOINTMENT (OUTPATIENT)
Dept: GENERAL RADIOLOGY | Facility: HOSPITAL | Age: 78
End: 2023-10-24
Payer: MEDICARE

## 2023-10-24 ENCOUNTER — APPOINTMENT (OUTPATIENT)
Dept: CT IMAGING | Facility: HOSPITAL | Age: 78
End: 2023-10-24
Payer: MEDICARE

## 2023-10-24 ENCOUNTER — HOSPITAL ENCOUNTER (EMERGENCY)
Facility: HOSPITAL | Age: 78
Discharge: ANOTHER HOSPITAL | End: 2023-10-24
Attending: EMERGENCY MEDICINE | Admitting: EMERGENCY MEDICINE
Payer: MEDICARE

## 2023-10-24 VITALS
RESPIRATION RATE: 18 BRPM | TEMPERATURE: 97.9 F | WEIGHT: 106 LBS | SYSTOLIC BLOOD PRESSURE: 171 MMHG | BODY MASS INDEX: 16.64 KG/M2 | OXYGEN SATURATION: 99 % | DIASTOLIC BLOOD PRESSURE: 91 MMHG | HEIGHT: 67 IN | HEART RATE: 67 BPM

## 2023-10-24 DIAGNOSIS — K55.029 ISCHEMIC NECROSIS OF SMALL BOWEL: Primary | ICD-10-CM

## 2023-10-24 LAB
A-A DO2: 6.2 MMHG (ref 0–300)
ABO GROUP BLD: NORMAL
ALBUMIN SERPL-MCNC: 3.5 G/DL (ref 3.5–5.2)
ALBUMIN/GLOB SERPL: 1.1 G/DL
ALP SERPL-CCNC: 87 U/L (ref 39–117)
ALT SERPL W P-5'-P-CCNC: 13 U/L (ref 1–33)
ANION GAP SERPL CALCULATED.3IONS-SCNC: 21.5 MMOL/L (ref 5–15)
ANISOCYTOSIS BLD QL: NORMAL
APTT PPP: 40.9 SECONDS (ref 26.5–34.5)
ARTERIAL PATENCY WRIST A: ABNORMAL
AST SERPL-CCNC: 27 U/L (ref 1–32)
ATMOSPHERIC PRESS: 733 MMHG
BASE EXCESS BLDA CALC-SCNC: -10.4 MMOL/L (ref 0–2)
BASOPHILS # BLD AUTO: 0.03 10*3/MM3 (ref 0–0.2)
BASOPHILS NFR BLD AUTO: 0.1 % (ref 0–1.5)
BDY SITE: ABNORMAL
BILIRUB SERPL-MCNC: 0.7 MG/DL (ref 0–1.2)
BLD GP AB SCN SERPL QL: NEGATIVE
BUN SERPL-MCNC: 22 MG/DL (ref 8–23)
BUN/CREAT SERPL: 23.7 (ref 7–25)
CALCIUM SPEC-SCNC: 8.5 MG/DL (ref 8.6–10.5)
CHLORIDE SERPL-SCNC: 105 MMOL/L (ref 98–107)
CO2 BLDA-SCNC: 16.1 MMOL/L (ref 22–33)
CO2 SERPL-SCNC: 16.5 MMOL/L (ref 22–29)
COHGB MFR BLD: 4.4 % (ref 0–5)
CREAT SERPL-MCNC: 0.93 MG/DL (ref 0.57–1)
CRP SERPL-MCNC: 5.79 MG/DL (ref 0–0.5)
D-LACTATE SERPL-SCNC: 11.3 MMOL/L (ref 0.5–2)
D-LACTATE SERPL-SCNC: 8.8 MMOL/L (ref 0.5–2)
DEPRECATED RDW RBC AUTO: 78.3 FL (ref 37–54)
EGFRCR SERPLBLD CKD-EPI 2021: 63 ML/MIN/1.73
EOSINOPHIL # BLD AUTO: 0.01 10*3/MM3 (ref 0–0.4)
EOSINOPHIL NFR BLD AUTO: 0 % (ref 0.3–6.2)
EPO SERPL-ACNC: 100.2 MIU/ML (ref 2.6–18.5)
ERYTHROCYTE [DISTWIDTH] IN BLOOD BY AUTOMATED COUNT: 19.8 % (ref 12.3–15.4)
GLOBULIN UR ELPH-MCNC: 3.1 GM/DL
GLUCOSE BLDC GLUCOMTR-MCNC: 236 MG/DL (ref 70–130)
GLUCOSE SERPL-MCNC: 221 MG/DL (ref 65–99)
HCO3 BLDA-SCNC: 15.2 MMOL/L (ref 20–26)
HCT VFR BLD AUTO: 24.4 % (ref 34–46.6)
HCT VFR BLD CALC: 20 % (ref 38–51)
HGB BLD-MCNC: 6.8 G/DL (ref 12–15.9)
HGB BLDA-MCNC: 6.5 G/DL (ref 13.5–17.5)
HOLD SPECIMEN: NORMAL
HOLD SPECIMEN: NORMAL
HYPOCHROMIA BLD QL: NORMAL
IMM GRANULOCYTES # BLD AUTO: 0.23 10*3/MM3 (ref 0–0.05)
IMM GRANULOCYTES NFR BLD AUTO: 1 % (ref 0–0.5)
INHALED O2 CONCENTRATION: 21 %
INR PPP: 2.03 (ref 0.9–1.1)
LIPASE SERPL-CCNC: 20 U/L (ref 13–60)
LYMPHOCYTES # BLD AUTO: 1.4 10*3/MM3 (ref 0.7–3.1)
LYMPHOCYTES NFR BLD AUTO: 5.8 % (ref 19.6–45.3)
Lab: ABNORMAL
Lab: ABNORMAL
MACROCYTES BLD QL SMEAR: NORMAL
MAGNESIUM SERPL-MCNC: 2.5 MG/DL (ref 1.6–2.4)
MCH RBC QN AUTO: 30.6 PG (ref 26.6–33)
MCHC RBC AUTO-ENTMCNC: 27.9 G/DL (ref 31.5–35.7)
MCV RBC AUTO: 109.9 FL (ref 79–97)
METHGB BLD QL: 0.5 % (ref 0–3)
MODALITY: ABNORMAL
MONOCYTES # BLD AUTO: 1.24 10*3/MM3 (ref 0.1–0.9)
MONOCYTES NFR BLD AUTO: 5.2 % (ref 5–12)
NEUTROPHILS NFR BLD AUTO: 21.12 10*3/MM3 (ref 1.7–7)
NEUTROPHILS NFR BLD AUTO: 87.9 % (ref 42.7–76)
NOTE: ABNORMAL
NOTIFIED BY: ABNORMAL
NOTIFIED WHO: ABNORMAL
NRBC BLD AUTO-RTO: 0.1 /100 WBC (ref 0–0.2)
OXYHGB MFR BLDV: 93 % (ref 94–99)
PCO2 BLDA: 31.3 MM HG (ref 35–45)
PCO2 TEMP ADJ BLD: ABNORMAL MM[HG]
PH BLDA: 7.29 PH UNITS (ref 7.35–7.45)
PH, TEMP CORRECTED: ABNORMAL
PLATELET # BLD AUTO: 255 10*3/MM3 (ref 140–450)
PMV BLD AUTO: 10.5 FL (ref 6–12)
PO2 BLDA: 102 MM HG (ref 83–108)
PO2 TEMP ADJ BLD: ABNORMAL MM[HG]
POTASSIUM SERPL-SCNC: 4.4 MMOL/L (ref 3.5–5.2)
PROT SERPL-MCNC: 6.6 G/DL (ref 6–8.5)
PROTHROMBIN TIME: 23.6 SECONDS (ref 12.1–14.7)
QT INTERVAL: 428 MS
QTC INTERVAL: 515 MS
RBC # BLD AUTO: 2.22 10*6/MM3 (ref 3.77–5.28)
REF LAB TEST METHOD: NORMAL
RH BLD: NEGATIVE
RH BLD: NEGATIVE
RH BLD: NORMAL
SAO2 % BLDCOA: 97.8 % (ref 94–99)
SMALL PLATELETS BLD QL SMEAR: ADEQUATE
SODIUM SERPL-SCNC: 143 MMOL/L (ref 136–145)
T&S EXPIRATION DATE: NORMAL
TROPONIN T SERPL HS-MCNC: 17 NG/L
TSH SERPL DL<=0.05 MIU/L-ACNC: 1.06 UIU/ML (ref 0.27–4.2)
TTG IGA SER-ACNC: <2 U/ML (ref 0–3)
UFH PPP CHRO-ACNC: >1.1 IU/ML (ref 0.3–0.7)
VENTILATOR MODE: ABNORMAL
WBC NRBC COR # BLD: 24.03 10*3/MM3 (ref 3.4–10.8)
WHOLE BLOOD HOLD COAG: NORMAL
WHOLE BLOOD HOLD SPECIMEN: NORMAL

## 2023-10-24 PROCEDURE — 82948 REAGENT STRIP/BLOOD GLUCOSE: CPT

## 2023-10-24 PROCEDURE — 99285 EMERGENCY DEPT VISIT HI MDM: CPT

## 2023-10-24 PROCEDURE — 86901 BLOOD TYPING SEROLOGIC RH(D): CPT | Performed by: EMERGENCY MEDICINE

## 2023-10-24 PROCEDURE — 36415 COLL VENOUS BLD VENIPUNCTURE: CPT

## 2023-10-24 PROCEDURE — 83050 HGB METHEMOGLOBIN QUAN: CPT

## 2023-10-24 PROCEDURE — 85007 BL SMEAR W/DIFF WBC COUNT: CPT | Performed by: EMERGENCY MEDICINE

## 2023-10-24 PROCEDURE — 84443 ASSAY THYROID STIM HORMONE: CPT | Performed by: EMERGENCY MEDICINE

## 2023-10-24 PROCEDURE — 25010000002 HYDROMORPHONE PER 4 MG: Performed by: EMERGENCY MEDICINE

## 2023-10-24 PROCEDURE — 86901 BLOOD TYPING SEROLOGIC RH(D): CPT

## 2023-10-24 PROCEDURE — 25010000002 MORPHINE PER 10 MG: Performed by: EMERGENCY MEDICINE

## 2023-10-24 PROCEDURE — 25810000003 SODIUM CHLORIDE 0.9 % SOLUTION: Performed by: EMERGENCY MEDICINE

## 2023-10-24 PROCEDURE — 25010000002 METRONIDAZOLE 500 MG/100ML SOLUTION: Performed by: EMERGENCY MEDICINE

## 2023-10-24 PROCEDURE — 85520 HEPARIN ASSAY: CPT | Performed by: EMERGENCY MEDICINE

## 2023-10-24 PROCEDURE — 82805 BLOOD GASES W/O2 SATURATION: CPT

## 2023-10-24 PROCEDURE — 71045 X-RAY EXAM CHEST 1 VIEW: CPT

## 2023-10-24 PROCEDURE — 74175 CTA ABDOMEN W/CONTRAST: CPT | Performed by: RADIOLOGY

## 2023-10-24 PROCEDURE — 93010 ELECTROCARDIOGRAM REPORT: CPT | Performed by: SPECIALIST

## 2023-10-24 PROCEDURE — 71045 X-RAY EXAM CHEST 1 VIEW: CPT | Performed by: RADIOLOGY

## 2023-10-24 PROCEDURE — 83735 ASSAY OF MAGNESIUM: CPT | Performed by: EMERGENCY MEDICINE

## 2023-10-24 PROCEDURE — 25010000002 LABETALOL 5 MG/ML SOLUTION: Performed by: EMERGENCY MEDICINE

## 2023-10-24 PROCEDURE — P9016 RBC LEUKOCYTES REDUCED: HCPCS

## 2023-10-24 PROCEDURE — 36430 TRANSFUSION BLD/BLD COMPNT: CPT

## 2023-10-24 PROCEDURE — 85025 COMPLETE CBC W/AUTO DIFF WBC: CPT | Performed by: EMERGENCY MEDICINE

## 2023-10-24 PROCEDURE — 86850 RBC ANTIBODY SCREEN: CPT | Performed by: EMERGENCY MEDICINE

## 2023-10-24 PROCEDURE — 74175 CTA ABDOMEN W/CONTRAST: CPT

## 2023-10-24 PROCEDURE — 93005 ELECTROCARDIOGRAM TRACING: CPT | Performed by: EMERGENCY MEDICINE

## 2023-10-24 PROCEDURE — 96367 TX/PROPH/DG ADDL SEQ IV INF: CPT

## 2023-10-24 PROCEDURE — 25510000001 IOPAMIDOL 61 % SOLUTION: Performed by: EMERGENCY MEDICINE

## 2023-10-24 PROCEDURE — 86900 BLOOD TYPING SEROLOGIC ABO: CPT | Performed by: EMERGENCY MEDICINE

## 2023-10-24 PROCEDURE — 36600 WITHDRAWAL OF ARTERIAL BLOOD: CPT

## 2023-10-24 PROCEDURE — 80053 COMPREHEN METABOLIC PANEL: CPT | Performed by: EMERGENCY MEDICINE

## 2023-10-24 PROCEDURE — 87040 BLOOD CULTURE FOR BACTERIA: CPT | Performed by: EMERGENCY MEDICINE

## 2023-10-24 PROCEDURE — 86900 BLOOD TYPING SEROLOGIC ABO: CPT

## 2023-10-24 PROCEDURE — 83605 ASSAY OF LACTIC ACID: CPT | Performed by: EMERGENCY MEDICINE

## 2023-10-24 PROCEDURE — 86140 C-REACTIVE PROTEIN: CPT | Performed by: EMERGENCY MEDICINE

## 2023-10-24 PROCEDURE — 96365 THER/PROPH/DIAG IV INF INIT: CPT

## 2023-10-24 PROCEDURE — 96368 THER/DIAG CONCURRENT INF: CPT

## 2023-10-24 PROCEDURE — 25010000002 LEVOFLOXACIN PER 250 MG: Performed by: EMERGENCY MEDICINE

## 2023-10-24 PROCEDURE — 96375 TX/PRO/DX INJ NEW DRUG ADDON: CPT

## 2023-10-24 PROCEDURE — 84484 ASSAY OF TROPONIN QUANT: CPT | Performed by: EMERGENCY MEDICINE

## 2023-10-24 PROCEDURE — 25510000001 IOPAMIDOL PER 1 ML: Performed by: EMERGENCY MEDICINE

## 2023-10-24 PROCEDURE — 85610 PROTHROMBIN TIME: CPT | Performed by: EMERGENCY MEDICINE

## 2023-10-24 PROCEDURE — 85730 THROMBOPLASTIN TIME PARTIAL: CPT | Performed by: EMERGENCY MEDICINE

## 2023-10-24 PROCEDURE — 74177 CT ABD & PELVIS W/CONTRAST: CPT

## 2023-10-24 PROCEDURE — 83690 ASSAY OF LIPASE: CPT | Performed by: EMERGENCY MEDICINE

## 2023-10-24 PROCEDURE — 82375 ASSAY CARBOXYHB QUANT: CPT

## 2023-10-24 PROCEDURE — 86923 COMPATIBILITY TEST ELECTRIC: CPT

## 2023-10-24 PROCEDURE — 74177 CT ABD & PELVIS W/CONTRAST: CPT | Performed by: RADIOLOGY

## 2023-10-24 RX ORDER — HEPARIN SODIUM 5000 [USP'U]/ML
50 INJECTION, SOLUTION INTRAVENOUS; SUBCUTANEOUS AS NEEDED
Status: DISCONTINUED | OUTPATIENT
Start: 2023-10-24 | End: 2023-10-24 | Stop reason: ALTCHOICE

## 2023-10-24 RX ORDER — HEPARIN SODIUM 10000 [USP'U]/100ML
12 INJECTION, SOLUTION INTRAVENOUS
Status: DISCONTINUED | OUTPATIENT
Start: 2023-10-24 | End: 2023-10-24 | Stop reason: ALTCHOICE

## 2023-10-24 RX ORDER — HEPARIN SODIUM 5000 [USP'U]/ML
25 INJECTION, SOLUTION INTRAVENOUS; SUBCUTANEOUS AS NEEDED
Status: DISCONTINUED | OUTPATIENT
Start: 2023-10-24 | End: 2023-10-24 | Stop reason: ALTCHOICE

## 2023-10-24 RX ORDER — LABETALOL HYDROCHLORIDE 5 MG/ML
10 INJECTION, SOLUTION INTRAVENOUS ONCE
Status: COMPLETED | OUTPATIENT
Start: 2023-10-24 | End: 2023-10-24

## 2023-10-24 RX ORDER — SODIUM CHLORIDE 9 MG/ML
125 INJECTION, SOLUTION INTRAVENOUS CONTINUOUS
Status: DISCONTINUED | OUTPATIENT
Start: 2023-10-24 | End: 2023-10-24 | Stop reason: HOSPADM

## 2023-10-24 RX ORDER — HYDROMORPHONE HYDROCHLORIDE 1 MG/ML
0.12 INJECTION, SOLUTION INTRAMUSCULAR; INTRAVENOUS; SUBCUTANEOUS ONCE
Status: COMPLETED | OUTPATIENT
Start: 2023-10-24 | End: 2023-10-24

## 2023-10-24 RX ORDER — LEVOFLOXACIN 5 MG/ML
500 INJECTION, SOLUTION INTRAVENOUS ONCE
Status: COMPLETED | OUTPATIENT
Start: 2023-10-24 | End: 2023-10-24

## 2023-10-24 RX ORDER — SODIUM CHLORIDE 0.9 % (FLUSH) 0.9 %
10 SYRINGE (ML) INJECTION AS NEEDED
Status: DISCONTINUED | OUTPATIENT
Start: 2023-10-24 | End: 2023-10-24 | Stop reason: HOSPADM

## 2023-10-24 RX ORDER — METRONIDAZOLE 500 MG/100ML
500 INJECTION, SOLUTION INTRAVENOUS ONCE
Status: COMPLETED | OUTPATIENT
Start: 2023-10-24 | End: 2023-10-24

## 2023-10-24 RX ADMIN — MORPHINE SULFATE 6 MG: 4 INJECTION, SOLUTION INTRAMUSCULAR; INTRAVENOUS at 09:08

## 2023-10-24 RX ADMIN — SODIUM CHLORIDE 125 ML/HR: 9 INJECTION, SOLUTION INTRAVENOUS at 06:10

## 2023-10-24 RX ADMIN — HYDROMORPHONE HYDROCHLORIDE 0.12 MG: 1 INJECTION, SOLUTION INTRAMUSCULAR; INTRAVENOUS; SUBCUTANEOUS at 06:11

## 2023-10-24 RX ADMIN — LABETALOL HYDROCHLORIDE 10 MG: 5 INJECTION, SOLUTION INTRAVENOUS at 07:37

## 2023-10-24 RX ADMIN — IOPAMIDOL 60 ML: 612 INJECTION, SOLUTION INTRAVENOUS at 06:38

## 2023-10-24 RX ADMIN — METRONIDAZOLE 500 MG: 500 INJECTION, SOLUTION INTRAVENOUS at 06:11

## 2023-10-24 RX ADMIN — SODIUM CHLORIDE 1443 ML: 9 INJECTION, SOLUTION INTRAVENOUS at 06:11

## 2023-10-24 RX ADMIN — LEVOFLOXACIN 500 MG: 5 INJECTION, SOLUTION INTRAVENOUS at 06:54

## 2023-10-24 RX ADMIN — IOPAMIDOL 100 ML: 755 INJECTION, SOLUTION INTRAVENOUS at 07:51

## 2023-10-24 NOTE — ED NOTES
Dr. Rubin speaking with patient at this time about CT findings. Patient and family verbalize understanding and want to be aggressive as possible, including possible surgery.

## 2023-10-24 NOTE — ED NOTES
Blood transfusion handed off at this time to Vini Delong RN. Approx 150mL left to be infused with approx 150mL already infused. Patient has no signs or symptoms of transfusion reaction.

## 2023-10-24 NOTE — PROGRESS NOTES
HEPARIN INFUSION  Yamile Angel is a  78 y.o. female receiving heparin infusion.     Therapy for (VTE/Cardiac):   cardiac  Patient Dosing Weight:     48.1  Initial Bolus (Y/N):   no  Any Bolus (Y/N):   yes     Signs or Symptoms of Bleeding:     no    Cardiac or Other (Not VTE)   Initial Bolus: 60 units/kg (Max 4,000 units)  Initial rate: 12 units/kg/hr (Max 1,000 units/hr)   Anti Xa Rebolus Infusion Hold time Change infusion Dose (Units/kg/hr) Next Anti Xa or aPTT Level Due   < 0.11 50 Units/kg  (4000 Units Max) None Increase by  3 Units/kg/hr 6 hours   0.11- 0.19 25 Units/kg  (2000 Units Max) None Increase by  2 Units/kg/hr 6 hours   0.2 - 0.29 0 None Increase by  1 Units/kg/hr 6 hours   0.3 - 0.5 0 None No Change 6 hours (after 2 consecutive levels in range check q24h @0700)   0.51 - 0.6 0 None Decrease by  1 Units/kg/hr 6 hours   0.61 - 0.8 0 30 Minutes Decrease by  2 Units/kg/hr 6 hours   0.81 - 1 0 60 Minutes Decrease by  3 Units/kg/hr 6 hours   >1 0 Hold  After Anti Xa less than 0.5 decrease previous rate by  4 Units/kg/hr  Every 2 hours until Anti Xa  less than 0.5 then when infusion restarts in 6 hours         Recommend anti-Xa every 6 hours.          Date   Time   Anti-Xa Current Rate (Unit/kg/hr) Bolus   (Units) Rate Change   (Unit/kg/hr) New Rate (Unit/kg/hr) Next   Anti-Xa Comments  Pump Check Daily   10/24 0900 > 1.10 0 no - - 1500 Continue to hold,  d/w   Mary  RN                                                                                                                                                                                                                                 Pharmacy will continue to follow anti-Xa results and monitor for signs and symptoms of bleeding or thrombosis.    Frank Henriquez, PharmD  10/24/23 08:28 EDT

## 2023-10-24 NOTE — Clinical Note
[facility] EMERGENCY DEPARTMENT  [address] [address]  Phone: [phone]    Yamile Angel was seen and treated in our emergency department on 10/24/2023.  She may return to work on 10/25/2023.         Thank you for choosing [facility].    Tonie Rubin MD

## 2023-10-24 NOTE — ED PROVIDER NOTES
Subjective   History of Present Illness  Patient is a 78-year-old female who presents complaining of the onset yesterday of lower abdominal pain, more so on the left.  This pain has steadily worsened.  She denies fever, chills, chest pain, shortness of breath, nausea, vomiting, diarrhea (reports is chronically constipated), hematemesis, hematochezia or melena, syncope or near syncope, focal numbness or weakness, other symptoms or other complaints.  Patient has a history of normocytic anemia, in fact she was evaluated by hematology yesterday, that note was reviewed.  Patient has a history of gastric ulcer, but as of most recent EGD in August that had healed, takes Prevacid 30 mg twice daily.  Colonoscopy in August revealed no source of bleeding, no acute findings.  She is scheduled for a PillCam study day after tomorrow.  She follows with gastroenterologist Dr. Nichols.  She has required blood transfusion and Venofer infusions in the past.  She does have a history of atrial fibrillation and is chronically anticoagulated with Eliquis 5 mg twice daily.  She has had a 60 pound weight loss since February of this year, unintentional.  CBC yesterday showed showed a hemoglobin of 8.4, white count of 15.  Chemistry panel yesterday showed normal renal function.  Last echocardiogram from January of this year:    Interpretation Summary       ·  Left ventricular ejection fraction appears to be 61 - 65%.  ·  Left ventricular wall thickness is consistent with mild septal asymmetric hypertrophy.  ·  Left ventricular diastolic function is consistent with (grade I) impaired relaxation.  ·  Left ventricular intracavitary gradient noted to be 70 mmHg.  This may represent dynamic  proximal ventricular obstruction.  Clinical correlation suggested.          Review of Systems   All other systems reviewed and are negative.      Past Medical History:   Diagnosis Date    Anemia     Arthritis     Atrial fibrillation     Choledocholithiasis  02/11/2023    COPD (chronic obstructive pulmonary disease)     Elevated cholesterol     Heart murmur     History of claustrophobia     History of transfusion 2018    2 units PRBC at [facility] for anemia, pt denies transfusion reaction    HOCM (hypertrophic obstructive cardiomyopathy) 01/19/2023    Hyperlipidemia     Hypertension     Sepsis with acute renal failure without septic shock, due to unspecified organism, unspecified acute renal failure type 12/31/2022    Sleep apnea     NON-COMPLIANT with CPAP       Allergies   Allergen Reactions    Penicillins Rash    Sulfa Antibiotics Rash       Past Surgical History:   Procedure Laterality Date    APPENDECTOMY      BREAST BIOPSY Left     CATARACT EXTRACTION EXTRACAPSULAR W/ INTRAOCULAR LENS IMPLANTATION Bilateral     COLONOSCOPY      COLONOSCOPY N/A 12/15/2021    Procedure: COLONOSCOPY FOR SCREENING;  Surgeon: Rhonda Parada MD;  Location: [facility] OR;  Service: Gastroenterology;  Laterality: N/A;    COLONOSCOPY N/A 8/15/2023    Procedure: COLONOSCOPY FOR SCREENING;  Surgeon: Rhonda Parada MD;  Location: [facility] OR;  Service: Gastroenterology;  Laterality: N/A;    ENDOSCOPY N/A 04/11/2023    Procedure: ESOPHAGOGASTRODUODENOSCOPY;  Surgeon: Shaquille Dupont MD;  Location:  [facility];  Service: Gastroenterology;  Laterality: N/A;    ENDOSCOPY N/A 8/15/2023    Procedure: ESOPHAGOGASTRODUODENOSCOPY WITH BIOPSY;  Surgeon: Rhonda Parada MD;  Location:  [facility] OR;  Service: Gastroenterology;  Laterality: N/A;    ERCP N/A 02/13/2023    Procedure: ENDOSCOPIC RETROGRADE CHOLANGIOPANCREATOGRAPHY;  Surgeon: Brunner, Mark I, MD;  Location:  [facility];  Service: Gastroenterology;  Laterality: N/A;    HYSTERECTOMY      SHOULDER SURGERY Right     Tendon Repair    TUBAL ABDOMINAL LIGATION      UPPER GASTROINTESTINAL ENDOSCOPY      WRIST SURGERY Right        Family History   Problem Relation Age of Onset    Heart disease Mother      Cancer Father        Social History     Socioeconomic History    Marital status:    Tobacco Use    Smoking status: Every Day     Packs/day: 1.00     Years: 65.00     Additional pack years: 0.00     Total pack years: 65.00     Types: Cigarettes     Start date: 1958     Passive exposure: Current    Smokeless tobacco: Never   Vaping Use    Vaping Use: Former   Substance and Sexual Activity    Alcohol use: Never    Drug use: Never    Sexual activity: Defer           Objective   Physical Exam  Vitals and nursing note reviewed.   Constitutional:       General: She is not in acute distress.     Appearance: Normal appearance. She is well-developed. She is not toxic-appearing or diaphoretic.      Comments: Well-developed well-nourished elderly female, awake and alert, does not appear to be in acute distress.  She is pale, but warm and dry.   HENT:      Head: Normocephalic and atraumatic.   Eyes:      General: No scleral icterus.     Pupils: Pupils are equal, round, and reactive to light.   Neck:      Trachea: No tracheal deviation.   Cardiovascular:      Rate and Rhythm: Normal rate and regular rhythm.   Pulmonary:      Effort: Pulmonary effort is normal. No respiratory distress.      Breath sounds: Normal breath sounds.   Chest:      Chest wall: No tenderness.   Abdominal:      General: Bowel sounds are normal.      Palpations: Abdomen is soft.      Tenderness: There is abdominal tenderness (Mild generalized tenderness, more focal in the left lower quadrant.  No acute peritoneal signs.). There is no guarding or rebound.   Genitourinary:     Comments: Digital rectal exam was performed.  No appreciable masses.  Soft brown stool.  No gross blood, no melena.  Hospital policy does not allow for Hemoccult testing, either at bedside or in lab.  Musculoskeletal:         General: No tenderness. Normal range of motion.      Cervical back: Normal range of motion and neck supple. No rigidity or tenderness.      Right lower  leg: No edema.      Left lower leg: No edema.   Skin:     General: Skin is warm and dry.      Capillary Refill: Capillary refill takes less than 2 seconds.      Coloration: Skin is pale.   Neurological:      General: No focal deficit present.      Mental Status: She is alert and oriented to person, place, and time.      GCS: GCS eye subscore is 4. GCS verbal subscore is 5. GCS motor subscore is 6.      Motor: No abnormal muscle tone.   Psychiatric:         Mood and Affect: Mood normal.         Behavior: Behavior normal.         Critical Care    Performed by: Tonie Rubin MD  Authorized by: Tonie Rubin MD    Critical care provider statement:     Critical care time (minutes):  35    Critical care time was exclusive of:  Separately billable procedures and treating other patients    Critical care was time spent personally by me on the following activities:  Development of treatment plan with patient or surrogate, examination of patient, obtaining history from patient or surrogate, ordering and performing treatments and interventions, ordering and review of laboratory studies, ordering and review of radiographic studies, pulse oximetry and re-evaluation of patient's condition    EKG shows sinus rhythm, rate 87.  MD interval 140, QRS duration 94, QTc 515 ms.  Evidence of LVH.  No apparent acute ischemia.  No evidence for STEMI.  Prolonged QT.         ED Course  ED Course as of 10/24/23 1647   Tue Oct 24, 2023   0552 Lab just called a lactic acid of 11.  Patient has a penicillin allergy.  We will start Levaquin and Flagyl.  Ordering sepsis bolus as well. [CM]   0659 Patient discussed with and care endorsed to Dr. Rubin at shift change.  Electronically signed by Chilo Chicas MD, 10/24/23, 6:59 AM EDT.   [CM]   0810 Ischemic bowel most of the SB, stenotic disease celiac and sma, pneumoatosis. D/w pt and familiy they are aware of the morbidity and mortality and wouldl like at this stage to be aggressive at  management. D/w Gen surg here - will need transfer to  for vasc and general surgery. Pt is already on eliquis, no heparin for now [PL]   0825 Talking to [facility] Dr. Youngblood [PL]   0837 Accepted Dr. Ragsdale - wants family to understand poor prospects [PL]      ED Course User Index  [CM] Chilo Chicas MD  [PL] Tonie Rubin MD                                          Medical Decision Making  Patient signed out with abdominal pain and elevated lactate and concern for ischemic bowel.  CTA ordered at time of signout.  CTA subsequently showed necrotic bowel from the stomach through the most of the small bowel with no flow in the celiac and very little flow from the SMA.  Accepted for transfer although prospects for the patient are dim.  Patient is already anticoagulated.  Discussed prognosis with family.    Problems Addressed:  Ischemic necrosis of small bowel: complicated acute illness or injury    Amount and/or Complexity of Data Reviewed  Labs: ordered.  Radiology: ordered.  ECG/medicine tests: ordered.    Risk  Prescription drug management.        Final diagnoses:   Ischemic necrosis of small bowel       ED Disposition  ED Disposition       ED Disposition   Transfer to Another Facility     Condition   --    Comment    - Dr. Youngblood               No follow-up provider specified.       Medication List      No changes were made to your prescriptions during this visit.            Tonie Rubin MD  10/24/23 1642       Tonie Rubin MD  10/24/23 1647       Tonie Rubin MD  10/24/23 1647

## 2023-10-24 NOTE — Clinical Note
[facility] EMERGENCY DEPARTMENT  [address] [address]  Phone: [phone]    Raquel Uriarte accompanied Yamile Angel to the emergency department on 10/24/2023. They may return to work on 10/25/2023.        Thank you for choosing [facility].    Tonie Rubin MD

## 2023-10-25 LAB — RH BLD: POSITIVE

## 2023-10-27 LAB
BH BB BLOOD EXPIRATION DATE: NORMAL
BH BB BLOOD EXPIRATION DATE: NORMAL
BH BB BLOOD TYPE BARCODE: 9500
BH BB BLOOD TYPE BARCODE: 9500
BH BB DISPENSE STATUS: NORMAL
BH BB DISPENSE STATUS: NORMAL
BH BB PRODUCT CODE: NORMAL
BH BB PRODUCT CODE: NORMAL
BH BB UNIT NUMBER: NORMAL
BH BB UNIT NUMBER: NORMAL
CROSSMATCH INTERPRETATION: NORMAL
CROSSMATCH INTERPRETATION: NORMAL
UNIT  ABO: NORMAL
UNIT  ABO: NORMAL
UNIT  RH: NORMAL
UNIT  RH: NORMAL

## 2023-10-29 LAB
BACTERIA SPEC AEROBE CULT: NORMAL
BACTERIA SPEC AEROBE CULT: NORMAL

## 2023-11-01 LAB
COPPER SERPL-MCNC: 33 UG/DL (ref 80–158)
ZINC SERPL-MCNC: 49 UG/DL (ref 44–115)

## 2023-11-06 RX ORDER — CALCIUM CARBONATE/VITAMIN D3 500-10/5ML
2 LIQUID (ML) ORAL DAILY
Qty: 30 CAPSULE | Refills: 2 | Status: SHIPPED | OUTPATIENT
Start: 2023-11-06

## 2023-11-06 NOTE — PROGRESS NOTES
Attempted to call patient regarding copper results. Unable to reach patient. Sent Copper Gluconate 2 mg capsule daily to [facility].

## 2025-04-14 NOTE — PROGRESS NOTES
December 2, 2021    Hello, may I speak with Yamile Angel?    My name is April      I am  with [facility]  [facility]  [address] [place] [address] [phone].    Before we get started may I verify your date of birth? 1945    I am calling to officially welcome you to our practice and ask about your recent visit. Is this a good time to talk? yes    Tell me about your visit with us. What things went well?  No complaints, everything went well.       We're always looking for ways to make our patients' experiences even better. Do you have recommendations on ways we may improve?  no    Overall were you satisfied with your first visit to our practice? yes       I appreciate you taking the time to speak with me today. Is there anything else I can do for you? no      Thank you, and have a great day.      
English

## (undated) DEVICE — Device: Brand: DEFENDO AIR/WATER/SUCTION AND BIOPSY VALVE

## (undated) DEVICE — TUBING, SUCTION, 1/4" X 20', STRAIGHT: Brand: MEDLINE INDUSTRIES, INC.

## (undated) DEVICE — SOLIDIFIER LIQ PREMISORB 1500CC

## (undated) DEVICE — BOWL PLSTC MD 16OZ BLU STRL

## (undated) DEVICE — RETRIEVAL BALLOON CATHETER: Brand: EXTRACTOR™ PRO RX

## (undated) DEVICE — SINGLE-USE BIOPSY FORCEPS: Brand: RADIAL JAW 4

## (undated) DEVICE — CONTN GRAD MEAS TRIANG 32OZ BLK

## (undated) DEVICE — HYBRID CO2 TUBING/CAP SET FOR OLYMPUS® SCOPES & CO2 SOURCE: Brand: ERBE

## (undated) DEVICE — SAFELINER SUCTION CANISTER 1000CC: Brand: DEROYAL

## (undated) DEVICE — ENDOGATOR AUXILIARY WATER JET CONNECTOR: Brand: ENDOGATOR

## (undated) DEVICE — TUBING, SUCTION, 1/4" X 10', STRAIGHT: Brand: MEDLINE

## (undated) DEVICE — KT ORCA ORCAPOD DISP STRL

## (undated) DEVICE — FIRST STEP BEDSIDE ADD WATER KIT - RESEALABLE STAND-UP POUCH, ENDOSCOPIC CLEANING PAD - 1 POUCH: Brand: FIRST STEP BEDSIDE ADD WATER KIT - RESEALABLE STAND-UP POUCH, ENDOSCOPIC CLEANIN

## (undated) DEVICE — THE BITE BLOCK MAXI, LATEX FREE STRAP IS USED TO PROTECT THE ENDOSCOPE INSERTION TUBE FROM BEING BITTEN BY THE PATIENT.

## (undated) DEVICE — Device

## (undated) DEVICE — LUBE JELLY FOIL PACK 1.4 OZ: Brand: MEDLINE INDUSTRIES, INC.

## (undated) DEVICE — 4-PORT MANIFOLD: Brand: NEPTUNE 2

## (undated) DEVICE — FRCP BX RADJAW4 NDL 2.8 240CM LG OG BX40

## (undated) DEVICE — CONN Y IRR DISP 1P/U

## (undated) DEVICE — DEV LK WIREGUIDE FUSN OLYMP SCP

## (undated) DEVICE — SINGLE PORT MANIFOLD: Brand: NEPTUNE 2

## (undated) DEVICE — SOL IRR H2O BTL 1000ML STRL

## (undated) DEVICE — SYR LL TP 10ML STRL

## (undated) DEVICE — GOWN,REINF,POLY,ECL,PP SLV,XL: Brand: MEDLINE

## (undated) DEVICE — SYR LUERLOK 50ML

## (undated) DEVICE — SPHINCTEROTOME: Brand: DREAMTOME™ RX 44

## (undated) DEVICE — SPNG ENDO BEDSIDE TUB ENZYM

## (undated) DEVICE — ADAPT CLN LUM OLYMP AIR/H20

## (undated) DEVICE — SUCTION CANISTER, 1500CC, RIGID: Brand: DEROYAL

## (undated) DEVICE — SYR LUERLOK 30CC

## (undated) DEVICE — INTRO ACCSR BLNT TP